# Patient Record
Sex: FEMALE | Race: BLACK OR AFRICAN AMERICAN | NOT HISPANIC OR LATINO | Employment: FULL TIME | URBAN - METROPOLITAN AREA
[De-identification: names, ages, dates, MRNs, and addresses within clinical notes are randomized per-mention and may not be internally consistent; named-entity substitution may affect disease eponyms.]

---

## 2020-01-30 ENCOUNTER — OFFICE VISIT (OUTPATIENT)
Dept: FAMILY MEDICINE | Facility: CLINIC | Age: 50
End: 2020-01-30
Payer: COMMERCIAL

## 2020-01-30 VITALS
OXYGEN SATURATION: 99 % | DIASTOLIC BLOOD PRESSURE: 87 MMHG | BODY MASS INDEX: 46.94 KG/M2 | HEART RATE: 72 BPM | WEIGHT: 274.94 LBS | SYSTOLIC BLOOD PRESSURE: 173 MMHG | HEIGHT: 64 IN

## 2020-01-30 DIAGNOSIS — R73.03 PREDIABETES: ICD-10-CM

## 2020-01-30 DIAGNOSIS — Z00.00 ANNUAL PHYSICAL EXAM: Primary | ICD-10-CM

## 2020-01-30 DIAGNOSIS — N89.8 VAGINAL DISCHARGE: ICD-10-CM

## 2020-01-30 DIAGNOSIS — E66.01 SEVERE OBESITY (BMI >= 40): ICD-10-CM

## 2020-01-30 DIAGNOSIS — Z12.39 SCREENING FOR BREAST CANCER: ICD-10-CM

## 2020-01-30 DIAGNOSIS — J30.2 SEASONAL ALLERGIES: ICD-10-CM

## 2020-01-30 DIAGNOSIS — I10 ESSENTIAL HYPERTENSION: ICD-10-CM

## 2020-01-30 DIAGNOSIS — Z11.4 SCREENING FOR HIV (HUMAN IMMUNODEFICIENCY VIRUS): ICD-10-CM

## 2020-01-30 DIAGNOSIS — Z11.59 ENCOUNTER FOR HEPATITIS C SCREENING TEST FOR LOW RISK PATIENT: ICD-10-CM

## 2020-01-30 DIAGNOSIS — Z12.4 SCREENING FOR MALIGNANT NEOPLASM OF CERVIX: ICD-10-CM

## 2020-01-30 PROCEDURE — 90471 IMMUNIZATION ADMIN: CPT | Mod: S$GLB,,, | Performed by: FAMILY MEDICINE

## 2020-01-30 PROCEDURE — 3079F DIAST BP 80-89 MM HG: CPT | Mod: CPTII,S$GLB,, | Performed by: FAMILY MEDICINE

## 2020-01-30 PROCEDURE — 87491 CHLMYD TRACH DNA AMP PROBE: CPT | Mod: 59

## 2020-01-30 PROCEDURE — 3077F PR MOST RECENT SYSTOLIC BLOOD PRESSURE >= 140 MM HG: ICD-10-PCS | Mod: CPTII,S$GLB,, | Performed by: FAMILY MEDICINE

## 2020-01-30 PROCEDURE — 3077F SYST BP >= 140 MM HG: CPT | Mod: CPTII,S$GLB,, | Performed by: FAMILY MEDICINE

## 2020-01-30 PROCEDURE — 90715 TDAP VACCINE 7 YRS/> IM: CPT | Mod: S$GLB,,, | Performed by: FAMILY MEDICINE

## 2020-01-30 PROCEDURE — 90715 TDAP VACCINE GREATER THAN OR EQUAL TO 7YO IM: ICD-10-PCS | Mod: S$GLB,,, | Performed by: FAMILY MEDICINE

## 2020-01-30 PROCEDURE — 87624 HPV HI-RISK TYP POOLED RSLT: CPT

## 2020-01-30 PROCEDURE — 99999 PR PBB SHADOW E&M-NEW PATIENT-LVL IV: CPT | Mod: PBBFAC,,, | Performed by: FAMILY MEDICINE

## 2020-01-30 PROCEDURE — 90471 TDAP VACCINE GREATER THAN OR EQUAL TO 7YO IM: ICD-10-PCS | Mod: S$GLB,,, | Performed by: FAMILY MEDICINE

## 2020-01-30 PROCEDURE — 99386 PREV VISIT NEW AGE 40-64: CPT | Mod: 25,S$GLB,, | Performed by: FAMILY MEDICINE

## 2020-01-30 PROCEDURE — 87481 CANDIDA DNA AMP PROBE: CPT | Mod: 59

## 2020-01-30 PROCEDURE — 99386 PR PREVENTIVE VISIT,NEW,40-64: ICD-10-PCS | Mod: 25,S$GLB,, | Performed by: FAMILY MEDICINE

## 2020-01-30 PROCEDURE — 3079F PR MOST RECENT DIASTOLIC BLOOD PRESSURE 80-89 MM HG: ICD-10-PCS | Mod: CPTII,S$GLB,, | Performed by: FAMILY MEDICINE

## 2020-01-30 PROCEDURE — 99999 PR PBB SHADOW E&M-NEW PATIENT-LVL IV: ICD-10-PCS | Mod: PBBFAC,,, | Performed by: FAMILY MEDICINE

## 2020-01-30 PROCEDURE — 88175 CYTOPATH C/V AUTO FLUID REDO: CPT

## 2020-01-30 RX ORDER — CHLORTHALIDONE 25 MG/1
25 TABLET ORAL DAILY
Qty: 90 TABLET | Refills: 3 | Status: SHIPPED | OUTPATIENT
Start: 2020-01-30 | End: 2021-01-04

## 2020-01-30 NOTE — PROGRESS NOTES
Subjective:       Patient ID: Criss Mccarthy is a 49 y.o. female.    Chief Complaint: Establish Care    48 yo F pt, previously followed by me at [facility], with PMH significant for HTN,  Prediabetes, and Seasonal Allergies. She presents to establish care with me at [facility].     She desires annual wellness visit and job physical form completed.    BP: 142/94 in the office today. + h/o HTN; previously rx'd Lisinopril 10 mg daily and HCTZ 25 mg daily. Out of medication x 6 weeks  BMI: 47.19  Diet/Lifestyle: Diet overall is poor; she is not exercising.   Last Eye Exam: 1 year ago; wears rx'd corrective lenses  Last Dental Exam: > 6 months   LMP:  1/14/2020; comes q28 days   Last Pap: DUE   Last Mammogram: 1 year ago   Last Flu Vaccine: Has not had; declines  Last Tdap Vaccine: > 10 years ago     Prediabetes: Had been adherent with metformin 500 mg daily until 6 weeks ago     Seasonal Allergies: Previously doing well with loratadine 10 mg daily      Review of Systems   Constitutional: Negative for activity change and unexpected weight change.   HENT: Negative for hearing loss and trouble swallowing.    Eyes: Negative for discharge and visual disturbance.   Respiratory: Negative for wheezing.    Cardiovascular: Negative for chest pain.   Gastrointestinal: Negative for blood in stool, constipation, diarrhea and vomiting.   Endocrine: Negative for polydipsia and polyuria.   Genitourinary: Positive for vaginal discharge (associated with foul odor). Negative for difficulty urinating, dysuria, hematuria and menstrual problem.   Musculoskeletal: Negative for joint swelling and neck pain.   Neurological: Negative for weakness and headaches.   Psychiatric/Behavioral: Negative for confusion and dysphoric mood.         Past Medical History:   Diagnosis Date    Allergies     Hypertension     Prediabetes        Patient Active Problem List   Diagnosis    Severe obesity (BMI >= 40)    Essential hypertension    Prediabetes  "   Vaginal discharge    Seasonal allergies       Past Surgical History:   Procedure Laterality Date    TUBAL LIGATION         Family History   Problem Relation Age of Onset    Heart disease Mother     Hypertension Father     Diabetes Father     Kidney cancer Father     Lymphoma Maternal Grandmother     Lymphoma Maternal Uncle        Social History     Tobacco Use   Smoking Status Never Smoker       Social History     Social History Narrative    Tobacco: None    EtOH: 2 mixed drinks 3 times per week     Drug: None    Employment: Works at  [employer] (Works alongside [employer])     Education: 11th grade      Lives with boyfriend     2 children (17yo, 14yo) that lives with parents       Objective:        Vitals:    01/30/20 1309 01/30/20 1331   BP: (!) 142/94 (!) 173/87   Pulse: 78 72   SpO2: 99%    Weight: 124.7 kg (274 lb 14.6 oz)    Height: 5' 4" (1.626 m)        Physical Exam   Constitutional: She is oriented to person, place, and time. She appears well-developed and well-nourished. No distress.   Morbidly Obese (BMI 47.19)   HENT:   Head: Normocephalic and atraumatic.   Right Ear: Tympanic membrane, external ear and ear canal normal.   Left Ear: Tympanic membrane, external ear and ear canal normal.   Nose: Nose normal.   Mouth/Throat: Oropharynx is clear and moist. No oropharyngeal exudate.   Eyes: Conjunctivae and EOM are normal. No scleral icterus.   Neck: Normal range of motion. Neck supple. No thyromegaly present.   Cardiovascular: Normal rate, regular rhythm and normal heart sounds. Exam reveals no gallop and no friction rub.   No murmur heard.  Pulmonary/Chest: Effort normal and breath sounds normal. She has no wheezes. She has no rales.   Abdominal: Soft. She exhibits no distension and no mass. There is no tenderness.   Genitourinary: Vagina normal and uterus normal. Pelvic exam was performed with patient supine. There is no rash or tenderness on the right labia. There is no rash or " tenderness on the left labia. Uterus is not tender. Cervix exhibits friability. Cervix exhibits no motion tenderness and no discharge. Right adnexum displays no mass, no tenderness and no fullness. Left adnexum displays no mass, no tenderness and no fullness. No erythema in the vagina. No signs of injury around the vagina. No vaginal discharge found.   Musculoskeletal: Normal range of motion. She exhibits no edema.   Lymphadenopathy:     She has no cervical adenopathy.   Neurological: She is alert and oriented to person, place, and time. She displays normal reflexes. No cranial nerve deficit or sensory deficit.   Skin: Skin is warm and dry. No erythema.   + hair and acne noted to chin   Psychiatric: She has a normal mood and affect. Her behavior is normal.       Assessment:       1. Annual physical exam    2. Severe obesity (BMI >= 40)    3. Screening for malignant neoplasm of cervix    4. Screening for breast cancer    5. Encounter for hepatitis C screening test for low risk patient    6. Screening for HIV (human immunodeficiency virus)    7. Essential hypertension    8. Prediabetes    9. Vaginal discharge    10. Seasonal allergies        Plan:       Criss was seen today for establish care.    Diagnoses and all orders for this visit:    Annual physical exam  Comments:  --see below  Orders:  -     (In Office Administered) Tdap Vaccine  -     RPR; Future    Severe obesity (BMI >= 40)  Comments:  --see below  Orders:  -     Vitamin D; Future    Screening for malignant neoplasm of cervix  -     Liquid-Based Pap Smear, Screening  -     HPV High Risk Genotypes, PCR    Screening for breast cancer  -     Mammo Digital Screening Bilat; Future    Encounter for hepatitis C screening test for low risk patient  -     Hepatitis C antibody; Future    Screening for HIV (human immunodeficiency virus)  -     HIV 1/2 Ag/Ab (4th Gen); Future    Essential hypertension  Comments:  Uncontrolled; Asymptomatic Has not taken previously  rx'd Lisinopril 10 mg daily + HCTZ 25 mg daily in 6 weeks. Start chlorthalidone 25 mg daily. BP f/u 2 weeks.  Orders:  -     chlorthalidone (HYGROTEN) 25 MG Tab; Take 1 tablet (25 mg total) by mouth once daily.  -     CBC auto differential; Future  -     Comprehensive metabolic panel; Future  -     Hemoglobin A1c; Future  -     Lipid panel; Future  -     TSH; Future    Prediabetes  Comments:  Previously rx'd metformin 500 mg daily. Out of med x 6 weeks. Plan for A1c with fasting labs.     Vaginal discharge  Comments:  None noted on exam. Vaginosis and GC/CT screen today  Orders:  -     Vaginosis Screen by DNA Probe  -     C. trachomatis/N. gonorrhoeae by AMP DNA    Seasonal allergies  Comments:  Continue Claritin 10 mg daily prn.     Annual Exam  Advised annual eye exams and adherence with rx'd corrective lenses  Advised dental exams q.6 months  Pap today 1/30/2020  Refer for Mammogram today 1/30/2020  Flu Vaccine declined today 1/30/2020  Tdap Vaccine today 1/30/2020    Morbidly Obese  BMI 47.19  Advised at least 30 min of CV exercise 5 days a week. Encouraged plant-based diet. Advised small/frequent meals.  Also advised avoidance of sweetened beverages, limit portion sizes, and discussed healthy carbohydrate options (brown rice, 100% whole wheat bread, wheat pasta)        Follow up in about 2 weeks (around 2/13/2020) for HTN and lab review.

## 2020-01-31 LAB
BACTERIAL VAGINOSIS DNA: POSITIVE
C TRACH DNA SPEC QL NAA+PROBE: NOT DETECTED
CANDIDA GLABRATA DNA: NEGATIVE
CANDIDA KRUSEI DNA: NEGATIVE
CANDIDA RRNA VAG QL PROBE: NEGATIVE
N GONORRHOEA DNA SPEC QL NAA+PROBE: NOT DETECTED
T VAGINALIS RRNA GENITAL QL PROBE: NEGATIVE

## 2020-02-01 ENCOUNTER — PATIENT MESSAGE (OUTPATIENT)
Dept: FAMILY MEDICINE | Facility: CLINIC | Age: 50
End: 2020-02-01

## 2020-02-01 DIAGNOSIS — N76.0 BACTERIAL VAGINOSIS: Primary | ICD-10-CM

## 2020-02-01 DIAGNOSIS — B96.89 BACTERIAL VAGINOSIS: Primary | ICD-10-CM

## 2020-02-01 RX ORDER — METRONIDAZOLE 500 MG/1
500 TABLET ORAL EVERY 12 HOURS
Qty: 14 TABLET | Refills: 0 | Status: SHIPPED | OUTPATIENT
Start: 2020-02-01 | End: 2020-02-08

## 2020-02-04 ENCOUNTER — PATIENT MESSAGE (OUTPATIENT)
Dept: FAMILY MEDICINE | Facility: CLINIC | Age: 50
End: 2020-02-04

## 2020-02-06 LAB
HPV HR 12 DNA SPEC QL NAA+PROBE: NEGATIVE
HPV16 AG SPEC QL: NEGATIVE
HPV18 DNA SPEC QL NAA+PROBE: NEGATIVE

## 2020-02-12 ENCOUNTER — HOSPITAL ENCOUNTER (OUTPATIENT)
Dept: RADIOLOGY | Facility: HOSPITAL | Age: 50
Discharge: HOME | End: 2020-02-12
Attending: FAMILY MEDICINE
Payer: COMMERCIAL

## 2020-02-12 DIAGNOSIS — Z12.39 SCREENING FOR BREAST CANCER: ICD-10-CM

## 2020-02-12 PROCEDURE — 77067 MAMMO DIGITAL SCREENING BILAT WITH TOMOSYNTHESIS_CAD: ICD-10-PCS | Mod: 26,,, | Performed by: RADIOLOGY

## 2020-02-12 PROCEDURE — 77067 SCR MAMMO BI INCL CAD: CPT | Mod: 26,,, | Performed by: RADIOLOGY

## 2020-02-12 PROCEDURE — 77067 SCR MAMMO BI INCL CAD: CPT | Mod: TC

## 2020-02-12 PROCEDURE — 77063 BREAST TOMOSYNTHESIS BI: CPT | Mod: 26,,, | Performed by: RADIOLOGY

## 2020-02-12 PROCEDURE — 77063 MAMMO DIGITAL SCREENING BILAT WITH TOMOSYNTHESIS_CAD: ICD-10-PCS | Mod: 26,,, | Performed by: RADIOLOGY

## 2020-02-23 LAB
FINAL PATHOLOGIC DIAGNOSIS: NORMAL
Lab: NORMAL

## 2020-02-24 ENCOUNTER — PATIENT MESSAGE (OUTPATIENT)
Dept: FAMILY MEDICINE | Facility: CLINIC | Age: 50
End: 2020-02-24

## 2020-02-28 ENCOUNTER — LAB VISIT (OUTPATIENT)
Dept: LAB | Facility: HOSPITAL | Age: 50
End: 2020-02-28
Attending: FAMILY MEDICINE
Payer: COMMERCIAL

## 2020-02-28 DIAGNOSIS — I10 ESSENTIAL HYPERTENSION: ICD-10-CM

## 2020-02-28 DIAGNOSIS — Z11.4 SCREENING FOR HIV (HUMAN IMMUNODEFICIENCY VIRUS): ICD-10-CM

## 2020-02-28 DIAGNOSIS — E66.01 SEVERE OBESITY (BMI >= 40): ICD-10-CM

## 2020-02-28 DIAGNOSIS — Z00.00 ANNUAL PHYSICAL EXAM: ICD-10-CM

## 2020-02-28 DIAGNOSIS — Z11.59 ENCOUNTER FOR HEPATITIS C SCREENING TEST FOR LOW RISK PATIENT: ICD-10-CM

## 2020-02-28 LAB
25(OH)D3+25(OH)D2 SERPL-MCNC: 11 NG/ML (ref 30–96)
ALBUMIN SERPL BCP-MCNC: 3.5 G/DL (ref 3.5–5.2)
ALP SERPL-CCNC: 89 U/L (ref 55–135)
ALT SERPL W/O P-5'-P-CCNC: 19 U/L (ref 10–44)
ANION GAP SERPL CALC-SCNC: 10 MMOL/L (ref 8–16)
AST SERPL-CCNC: 19 U/L (ref 10–40)
BASOPHILS # BLD AUTO: 0.04 K/UL (ref 0–0.2)
BASOPHILS NFR BLD: 0.5 % (ref 0–1.9)
BILIRUB SERPL-MCNC: 0.6 MG/DL (ref 0.1–1)
BUN SERPL-MCNC: 9 MG/DL (ref 6–20)
CALCIUM SERPL-MCNC: 9 MG/DL (ref 8.7–10.5)
CHLORIDE SERPL-SCNC: 101 MMOL/L (ref 95–110)
CHOLEST SERPL-MCNC: 219 MG/DL (ref 120–199)
CHOLEST/HDLC SERPL: 2.3 {RATIO} (ref 2–5)
CO2 SERPL-SCNC: 29 MMOL/L (ref 23–29)
CREAT SERPL-MCNC: 0.8 MG/DL (ref 0.5–1.4)
DIFFERENTIAL METHOD: ABNORMAL
EOSINOPHIL # BLD AUTO: 0.1 K/UL (ref 0–0.5)
EOSINOPHIL NFR BLD: 0.8 % (ref 0–8)
ERYTHROCYTE [DISTWIDTH] IN BLOOD BY AUTOMATED COUNT: 15.8 % (ref 11.5–14.5)
EST. GFR  (AFRICAN AMERICAN): >60 ML/MIN/1.73 M^2
EST. GFR  (NON AFRICAN AMERICAN): >60 ML/MIN/1.73 M^2
ESTIMATED AVG GLUCOSE: 131 MG/DL (ref 68–131)
GLUCOSE SERPL-MCNC: 98 MG/DL (ref 70–110)
HBA1C MFR BLD HPLC: 6.2 % (ref 4–5.6)
HCT VFR BLD AUTO: 34.8 % (ref 37–48.5)
HCV AB SERPL QL IA: NEGATIVE
HDLC SERPL-MCNC: 95 MG/DL (ref 40–75)
HDLC SERPL: 43.4 % (ref 20–50)
HGB BLD-MCNC: 10.1 G/DL (ref 12–16)
HIV 1+2 AB+HIV1 P24 AG SERPL QL IA: NEGATIVE
IMM GRANULOCYTES # BLD AUTO: 0.03 K/UL (ref 0–0.04)
IMM GRANULOCYTES NFR BLD AUTO: 0.3 % (ref 0–0.5)
LDLC SERPL CALC-MCNC: 112.4 MG/DL (ref 63–159)
LYMPHOCYTES # BLD AUTO: 2.1 K/UL (ref 1–4.8)
LYMPHOCYTES NFR BLD: 24.2 % (ref 18–48)
MCH RBC QN AUTO: 24.2 PG (ref 27–31)
MCHC RBC AUTO-ENTMCNC: 29 G/DL (ref 32–36)
MCV RBC AUTO: 83 FL (ref 82–98)
MONOCYTES # BLD AUTO: 0.6 K/UL (ref 0.3–1)
MONOCYTES NFR BLD: 7 % (ref 4–15)
NEUTROPHILS # BLD AUTO: 5.9 K/UL (ref 1.8–7.7)
NEUTROPHILS NFR BLD: 67.2 % (ref 38–73)
NONHDLC SERPL-MCNC: 124 MG/DL
NRBC BLD-RTO: 0 /100 WBC
PLATELET # BLD AUTO: 504 K/UL (ref 150–350)
PMV BLD AUTO: 9.5 FL (ref 9.2–12.9)
POTASSIUM SERPL-SCNC: 3.2 MMOL/L (ref 3.5–5.1)
PROT SERPL-MCNC: 7.5 G/DL (ref 6–8.4)
RBC # BLD AUTO: 4.18 M/UL (ref 4–5.4)
SODIUM SERPL-SCNC: 140 MMOL/L (ref 136–145)
TRIGL SERPL-MCNC: 58 MG/DL (ref 30–150)
TSH SERPL DL<=0.005 MIU/L-ACNC: 1.95 UIU/ML (ref 0.4–4)
WBC # BLD AUTO: 8.81 K/UL (ref 3.9–12.7)

## 2020-02-28 PROCEDURE — 82306 VITAMIN D 25 HYDROXY: CPT

## 2020-02-28 PROCEDURE — 80061 LIPID PANEL: CPT

## 2020-02-28 PROCEDURE — 86592 SYPHILIS TEST NON-TREP QUAL: CPT

## 2020-02-28 PROCEDURE — 84443 ASSAY THYROID STIM HORMONE: CPT

## 2020-02-28 PROCEDURE — 83036 HEMOGLOBIN GLYCOSYLATED A1C: CPT

## 2020-02-28 PROCEDURE — 85025 COMPLETE CBC W/AUTO DIFF WBC: CPT

## 2020-02-28 PROCEDURE — 86803 HEPATITIS C AB TEST: CPT

## 2020-02-28 PROCEDURE — 80053 COMPREHEN METABOLIC PANEL: CPT

## 2020-02-28 PROCEDURE — 86703 HIV-1/HIV-2 1 RESULT ANTBDY: CPT

## 2020-02-28 PROCEDURE — 36415 COLL VENOUS BLD VENIPUNCTURE: CPT

## 2020-02-29 ENCOUNTER — PATIENT MESSAGE (OUTPATIENT)
Dept: FAMILY MEDICINE | Facility: CLINIC | Age: 50
End: 2020-02-29

## 2020-02-29 LAB — RPR SER QL: NORMAL

## 2020-03-08 ENCOUNTER — PATIENT MESSAGE (OUTPATIENT)
Dept: FAMILY MEDICINE | Facility: CLINIC | Age: 50
End: 2020-03-08

## 2020-03-08 DIAGNOSIS — E87.6 HYPOKALEMIA: ICD-10-CM

## 2020-03-08 DIAGNOSIS — E55.9 VITAMIN D DEFICIENCY: Primary | ICD-10-CM

## 2020-03-08 RX ORDER — POTASSIUM CHLORIDE 750 MG/1
20 TABLET, EXTENDED RELEASE ORAL DAILY
Qty: 90 TABLET | Refills: 3 | Status: SHIPPED | OUTPATIENT
Start: 2020-03-08 | End: 2020-10-07

## 2020-03-08 RX ORDER — ERGOCALCIFEROL 1.25 MG/1
50000 CAPSULE ORAL
Qty: 4 CAPSULE | Refills: 2 | Status: SHIPPED | OUTPATIENT
Start: 2020-03-08 | End: 2020-06-17

## 2020-03-08 NOTE — TELEPHONE ENCOUNTER
The 10-year ASCVD risk score (Remberto STAFFORD Jr., et al., 2013) is: 4.6%    Values used to calculate the score:      Age: 49 years      Sex: Female      Is Non- : Yes      Diabetic: No      Tobacco smoker: No      Systolic Blood Pressure: 173 mmHg      Is BP treated: Yes      HDL Cholesterol: 95 mg/dL      Total Cholesterol: 219 mg/dL

## 2020-03-25 ENCOUNTER — PATIENT MESSAGE (OUTPATIENT)
Dept: FAMILY MEDICINE | Facility: CLINIC | Age: 50
End: 2020-03-25

## 2020-04-23 ENCOUNTER — PATIENT MESSAGE (OUTPATIENT)
Dept: ADMINISTRATIVE | Facility: HOSPITAL | Age: 50
End: 2020-04-23

## 2020-04-28 ENCOUNTER — OFFICE VISIT (OUTPATIENT)
Dept: FAMILY MEDICINE | Facility: CLINIC | Age: 50
End: 2020-04-28
Payer: COMMERCIAL

## 2020-04-28 DIAGNOSIS — J30.2 SEASONAL ALLERGIES: ICD-10-CM

## 2020-04-28 DIAGNOSIS — N76.0 BACTERIAL VAGINOSIS: Primary | ICD-10-CM

## 2020-04-28 DIAGNOSIS — L30.9 DERMATITIS: ICD-10-CM

## 2020-04-28 DIAGNOSIS — E66.01 SEVERE OBESITY (BMI >= 40): ICD-10-CM

## 2020-04-28 DIAGNOSIS — B96.89 BACTERIAL VAGINOSIS: Primary | ICD-10-CM

## 2020-04-28 PROCEDURE — 99214 PR OFFICE/OUTPT VISIT, EST, LEVL IV, 30-39 MIN: ICD-10-PCS | Mod: 95,,, | Performed by: FAMILY MEDICINE

## 2020-04-28 PROCEDURE — 99214 OFFICE O/P EST MOD 30 MIN: CPT | Mod: 95,,, | Performed by: FAMILY MEDICINE

## 2020-04-28 RX ORDER — METRONIDAZOLE 500 MG/1
500 TABLET ORAL 2 TIMES DAILY
Qty: 14 TABLET | Refills: 0 | Status: SHIPPED | OUTPATIENT
Start: 2020-04-28 | End: 2020-05-05

## 2020-04-28 RX ORDER — FLUTICASONE PROPIONATE 50 MCG
2 SPRAY, SUSPENSION (ML) NASAL DAILY
Qty: 16 G | Refills: 0 | Status: SHIPPED | OUTPATIENT
Start: 2020-04-28 | End: 2020-05-20

## 2020-04-28 RX ORDER — TRIAMCINOLONE ACETONIDE 1 MG/G
CREAM TOPICAL 2 TIMES DAILY
Qty: 30 G | Refills: 1 | Status: SHIPPED | OUTPATIENT
Start: 2020-04-28 | End: 2021-03-22

## 2020-04-28 NOTE — PROGRESS NOTES
The patient location is: home  The chief complaint leading to consultation is:  Vaginal odor  Visit type: Virtual visit with synchronous audio and video  Total time spent with patient:  15 min  Each patient to whom he or she provides medical services by telemedicine is:  (1) informed of the relationship between the physician and patient and the respective role of any other health care provider with respect to management of the patient; and (2) notified that he or she may decline to receive medical services by telemedicine and may withdraw from such care at any time.    (Portions of this note were dictated using voice recognition software and may contain dictation related errors in spelling/grammar/syntax not found on text review)    CC:   Chief Complaint   Patient presents with    Vaginal odor    Sinus Problem    Rash       HPI: 49 y.o. female patient of Dr. Sanon, new to me, on virtual visit for urgent care concern about vaginal odor.  Saw Dr. Sanon for annual physical exam in January 2020, also complained of vaginal discharge associated with foul odor at that time.  Testing demonstrated negative GC and chlamydia.  Positive affirm test for bacterial vaginosis.  Pap normal.  Was prescribed metronidazole, advised on proper vaginal hygiene and avoiding douching.  Sent message on 04/23 concerned about return of vaginal odor similar to prior.  States that both episodes happened after intercourse with her male partner with whom she is no longer with now.  In between episodes she had no significant issues.  Denies any vaginal bleeding.  Denies any pelvic pain.  Denies any urinary symptoms.  No fevers or chills    Rash on neck, feels like it to heat rash, worsened by sweats.  Usually wears a necklace but has not been wearing this recently with onset of rash.  Feels itchy.  No other areas affected by rash.  Skin is very dry    Sinus problem:  Had seen Dr. Sanon in the past and was on Claritin also Flonase.   Has not been on anything recently but does get some nasal congestion.  No other acute bacterial sinusitis concerns.  Did feel somewhat out of it and malaise this morning from the symptoms so she took off work      Past Medical History:   Diagnosis Date    Allergies     Hypertension     Prediabetes        Past Surgical History:   Procedure Laterality Date    TUBAL LIGATION         Family History   Problem Relation Age of Onset    Heart disease Mother     Hypertension Father     Diabetes Father     Kidney cancer Father     Lymphoma Maternal Grandmother     Lymphoma Maternal Uncle        Social History     Tobacco Use    Smoking status: Never Smoker   Substance Use Topics    Alcohol use: Yes     Frequency: 2-3 times a week     Drinks per session: 1 or 2     Binge frequency: Monthly    Drug use: Not on file       Lab Results   Component Value Date    WBC 8.81 02/28/2020    HGB 10.1 (L) 02/28/2020    HCT 34.8 (L) 02/28/2020    MCV 83 02/28/2020     (H) 02/28/2020    CHOL 219 (H) 02/28/2020    TRIG 58 02/28/2020    HDL 95 (H) 02/28/2020    ALT 19 02/28/2020    AST 19 02/28/2020    BILITOT 0.6 02/28/2020    ALKPHOS 89 02/28/2020     02/28/2020    K 3.2 (L) 02/28/2020     02/28/2020    CREATININE 0.8 02/28/2020    ESTGFRAFRICA >60.0 02/28/2020    EGFRNONAA >60.0 02/28/2020    CALCIUM 9.0 02/28/2020    ALBUMIN 3.5 02/28/2020    BUN 9 02/28/2020    CO2 29 02/28/2020    TSH 1.949 02/28/2020    HGBA1C 6.2 (H) 02/28/2020    LDLCALC 112.4 02/28/2020    GLU 98 02/28/2020                 ROS:  GENERAL:  Above  SKIN:  Above.  HEAD:  Above  EYES: No visual changes  EARS: No ear pain or changes in hearing.  NOSE:  Above  MOUTH & THROAT: No hoarseness, change in voice, or sore throat.  NODES: Denies swollen glands.  CHEST: Denies WAGNER, cyanosis, wheezing, cough and sputum production.  CARDIOVASCULAR: Denies chest pain, PND, orthopnea.  ABDOMEN: No nausea, vomiting, or changes in bowel  function.  URINARY: No flank pain, dysuria or hematuria.  PERIPHERAL VASCULAR: No claudication or cyanosis.  MUSCULOSKELETAL: No joint stiffness or swelling. Denies back pain.  NEUROLOGIC: No weakness or numbness.       PE (elements able to be captured on virtual encounter)  APPEARANCE: Well nourished, well developed, in no acute distress.    HEAD: Normocephalic, atraumatic.  EYES:  .   Conjunctivae noninjected.  RESPIRATORY:  No increased work of breathing or objective visual signs of dyspnea  PSYCHIATRIC:  Normal mood and affect, alert and oriented, appropriate answers to questions  SKIN :  Noted hyperpigmented scaly rash noted along neck line bilaterally.  No erythema noted.    IMPRESSION  1. Bacterial vaginosis    2. Dermatitis    3. Seasonal allergies    4. Severe obesity (BMI >= 40)            PLAN  History points to likely bacterial vaginosis.  Advised prevention techniques.  Retry  Flagyl 500 mg b.i.d. for 7 days.  If persistent symptoms may need to consider a more prolonged protocol with intravaginal metronidazole as well    Dermatitis around neck, possibly atopic versus contact related.  Trial of triamcinolone cream b.i.d. for 1-2 weeks.  Advised daily moisturization with Cetaphil.  Reviewed labs, no   diabetes, prediabetes noted on most recent lab work    Nasal congestion likely allergic rhinitis related.  Advise Claritin is okay.  Will send over an other course of Flonase 2 sprays each nostril once daily for the next 2-3 weeks.

## 2020-05-20 RX ORDER — FLUTICASONE PROPIONATE 50 MCG
2 SPRAY, SUSPENSION (ML) NASAL DAILY
Qty: 16 ML | Refills: 0 | Status: SHIPPED | OUTPATIENT
Start: 2020-05-20 | End: 2020-06-18 | Stop reason: SDUPTHER

## 2020-09-02 ENCOUNTER — PATIENT OUTREACH (OUTPATIENT)
Dept: ADMINISTRATIVE | Facility: HOSPITAL | Age: 50
End: 2020-09-02

## 2020-09-16 ENCOUNTER — OFFICE VISIT (OUTPATIENT)
Dept: FAMILY MEDICINE | Facility: CLINIC | Age: 50
End: 2020-09-16
Payer: COMMERCIAL

## 2020-09-16 VITALS
DIASTOLIC BLOOD PRESSURE: 71 MMHG | WEIGHT: 267 LBS | SYSTOLIC BLOOD PRESSURE: 132 MMHG | HEART RATE: 88 BPM | HEIGHT: 64 IN | OXYGEN SATURATION: 100 % | BODY MASS INDEX: 45.58 KG/M2 | TEMPERATURE: 99 F

## 2020-09-16 DIAGNOSIS — H31.8 IDIOPATHIC POLYPOIDAL CHOROIDAL VASCULOPATHY: ICD-10-CM

## 2020-09-16 DIAGNOSIS — R73.03 PREDIABETES: ICD-10-CM

## 2020-09-16 DIAGNOSIS — R06.83 SNORING: Primary | ICD-10-CM

## 2020-09-16 DIAGNOSIS — I10 ESSENTIAL HYPERTENSION: ICD-10-CM

## 2020-09-16 DIAGNOSIS — H35.3290 NEOVASCULAR AGE-RELATED MACULAR DEGENERATION: ICD-10-CM

## 2020-09-16 DIAGNOSIS — R06.09 DYSPNEA ON EXERTION: ICD-10-CM

## 2020-09-16 DIAGNOSIS — D64.9 NORMOCYTIC ANEMIA: ICD-10-CM

## 2020-09-16 DIAGNOSIS — E66.01 SEVERE OBESITY (BMI >= 40): ICD-10-CM

## 2020-09-16 PROBLEM — N89.8 VAGINAL DISCHARGE: Status: RESOLVED | Noted: 2020-01-30 | Resolved: 2020-09-16

## 2020-09-16 PROCEDURE — 3075F SYST BP GE 130 - 139MM HG: CPT | Mod: CPTII,S$GLB,, | Performed by: FAMILY MEDICINE

## 2020-09-16 PROCEDURE — 99999 PR PBB SHADOW E&M-EST. PATIENT-LVL IV: ICD-10-PCS | Mod: PBBFAC,,, | Performed by: FAMILY MEDICINE

## 2020-09-16 PROCEDURE — 99999 PR PBB SHADOW E&M-EST. PATIENT-LVL IV: CPT | Mod: PBBFAC,,, | Performed by: FAMILY MEDICINE

## 2020-09-16 PROCEDURE — 3078F PR MOST RECENT DIASTOLIC BLOOD PRESSURE < 80 MM HG: ICD-10-PCS | Mod: CPTII,S$GLB,, | Performed by: FAMILY MEDICINE

## 2020-09-16 PROCEDURE — 3075F PR MOST RECENT SYSTOLIC BLOOD PRESS GE 130-139MM HG: ICD-10-PCS | Mod: CPTII,S$GLB,, | Performed by: FAMILY MEDICINE

## 2020-09-16 PROCEDURE — 1126F AMNT PAIN NOTED NONE PRSNT: CPT | Mod: S$GLB,,, | Performed by: FAMILY MEDICINE

## 2020-09-16 PROCEDURE — 1126F PR PAIN SEVERITY QUANTIFIED, NO PAIN PRESENT: ICD-10-PCS | Mod: S$GLB,,, | Performed by: FAMILY MEDICINE

## 2020-09-16 PROCEDURE — 99214 OFFICE O/P EST MOD 30 MIN: CPT | Mod: S$GLB,,, | Performed by: FAMILY MEDICINE

## 2020-09-16 PROCEDURE — 99214 PR OFFICE/OUTPT VISIT, EST, LEVL IV, 30-39 MIN: ICD-10-PCS | Mod: S$GLB,,, | Performed by: FAMILY MEDICINE

## 2020-09-16 PROCEDURE — 3078F DIAST BP <80 MM HG: CPT | Mod: CPTII,S$GLB,, | Performed by: FAMILY MEDICINE

## 2020-09-16 PROCEDURE — 3008F BODY MASS INDEX DOCD: CPT | Mod: CPTII,S$GLB,, | Performed by: FAMILY MEDICINE

## 2020-09-16 PROCEDURE — 3008F PR BODY MASS INDEX (BMI) DOCUMENTED: ICD-10-PCS | Mod: CPTII,S$GLB,, | Performed by: FAMILY MEDICINE

## 2020-09-16 NOTE — PROGRESS NOTES
Subjective:       Patient ID: Criss Mccarthy is a 50 y.o. female.    Chief Complaint: Eye Problem and Shortness of Breath    51 yo F, established pt of mine, with PMH significant for HTN,  Prediabetes, and Seasonal Allergies. She presents today to discuss abnormal eye findings.  Since for a follow-up office visit; last evaluated 1/2020; accompanied by her 15-year-old daughter in the office today.  Patient had been having difficulty seeing from the right eye and was evaluated at [facility] earlier today.  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  Recommended to start DHEA daily; magnesium threonate 144 mg 2 tabs BID; Coenzyme Q10; and Carnitine once daily.  Advised to avoid steroids (including intranasal); Sudafed; and Afrin.  She is okay to take Astelin nasal spray as needed for allergies. Ophthalmology did recommend patient be evaluated for sleep apnea as this can be a cause of her abnormal eye findings.  Patient does endorse snoring; denies daytime fatigue/tiredness; daughter reports observed apneic episodes; pt with h/o obesity and well-controlled hypertension.     Pt does c/o dyspnea on exertion.  States that she is completely out of breath after ascending a flight of steps in her home.  Denies associated chest pain, dizziness, and syncope.  No lower extremity swelling.  Symptoms resolve at rest.  She has begun to exercise at the gym in hopes to improve exercise tolerance.  She began walking on the treadmill for 7 min and has increased tolerance to 15 min thus far. She has lost about 8 lb over the past 8 months.  Noted that her last A1c was 6.2 February 2020; TC to 19, TG 58, HDL 95, .4 February 2020; The 10-year ASCVD risk score ([place] DC Jr., et al., 2013) is: 1.5% blood count from February 2020 showed H/H 10.1/34.8; MCV 83.  Patient is concerned she has a family history of heart disease/heart  failure        Review of Systems   Constitutional: Negative for activity change and unexpected weight change.   HENT: Negative for hearing loss, rhinorrhea and trouble swallowing.    Eyes: Positive for visual disturbance. Negative for discharge.   Respiratory: Positive for shortness of breath. Negative for chest tightness and wheezing.    Cardiovascular: Positive for palpitations. Negative for chest pain.   Gastrointestinal: Negative for blood in stool, constipation, diarrhea and vomiting.   Endocrine: Negative for polydipsia and polyuria.   Genitourinary: Negative for difficulty urinating, dysuria, hematuria and menstrual problem.   Musculoskeletal: Positive for arthralgias. Negative for joint swelling and neck pain.   Neurological: Negative for weakness and headaches.   Psychiatric/Behavioral: Negative for confusion and dysphoric mood.         Past Medical History:   Diagnosis Date    Allergies     Hypertension     Idiopathic polypoidal choroidal vasculopathy     Prediabetes        Patient Active Problem List   Diagnosis    Severe obesity (BMI >= 40)    Essential hypertension    Prediabetes    Seasonal allergies    Neovascular age-related macular degeneration    Idiopathic polypoidal choroidal vasculopathy    Normocytic anemia       Past Surgical History:   Procedure Laterality Date    TUBAL LIGATION         Family History   Problem Relation Age of Onset    Heart disease Mother     Hypertension Father     Diabetes Father     Kidney cancer Father     Lymphoma Maternal Grandmother     Lymphoma Maternal Uncle        Social History     Tobacco Use   Smoking Status Never Smoker       Social History     Social History Narrative    Tobacco: None    EtOH: 2 mixed drinks 3 times per week     Drug: None    Employment: Works at  [employer] (Works alongside [employer])     Education: 11th grade      Lives with boyfriend     2 children (15yo, 14yo) that lives with parents       Objective:     "    Vitals:    09/16/20 1314   BP: 132/71   Pulse: 88   Temp: 98.8 °F (37.1 °C)   TempSrc: Oral   SpO2: 100%   Weight: 121.1 kg (266 lb 15.6 oz)   Height: 5' 4" (1.626 m)         Physical Exam   Constitutional: She is oriented to person, place, and time. She appears obese.  No distress.   Morbidly Obese (BMI 45.83)   HENT:   Head: Normocephalic and atraumatic.   Right Ear:  External ear normal  Left Ear:  External ear normal  Nose: Not examined  Mouth/Throat: Not examined  Eyes: Conjunctivae and EOM are normal. No scleral icterus.   Neck: Normal range of motion. Neck supple. No thyromegaly present.   Cardiovascular: Normal rate, regular rhythm and normal heart sounds. Exam reveals no gallop and no friction rub.   No murmur heard.  Pulmonary/Chest: Effort normal and breath sounds normal. She has no wheezes. She has no rales.   Musculoskeletal: Normal range of motion. She exhibits no lower extremity edema.   Neurological: She is alert and oriented to person, place, and time. She displays normal reflexes. No cranial nerve deficit or sensory deficit.   Skin: Skin is warm and dry. No erythema.   + hair and acne noted to chin   Psychiatric: She has a normal mood and affect. Her behavior is normal.     Assessment:       1. Snoring    2. Dyspnea on exertion    3. Essential hypertension    4. Prediabetes    5. Severe obesity (BMI >= 40)    6. 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 Idiopathic polypoidal choroidal vasculopathy    8. Normocytic anemia        Plan:       Criss was seen today for eye problem and shortness of breath.    Diagnoses and all orders for this visit:    Snoring  -     Ambulatory referral/consult to Sleep Disorders; Future    Dyspnea on exertion  -     IN OFFICE EKG 12-LEAD (to Muse)  -     Echo Color Flow Doppler? Yes; Future    Essential hypertension    Prediabetes    Severe obesity (BMI >= 40)    Neovascular age-related macular degeneration    Idiopathic polypoidal choroidal " vasculopathy    Normocytic anemia      Snoring  Stop screen 3/4  Refer to sleep medicine for home sleep study    Dyspnea on exertion  Likely due to obesity  Will rule out cardiovascular etiology with EKG and TTE  Cardiovascular risks include hypertension which is well controlled; and prediabetes with A1c of 6.2 February 2020  Metabolic risk:  Mild normocytic anemia  Encouraged continued diet/lifestyle modifications and weight loss    Hypertension  Well controlled  Continue chlorthalidone 25 mg daily    Pre diabetes  A1c 6.2 02/2020  Diet/lifestyle modifications as below    Normocytic anemia   H/H 10.1/34.8; MCV 83 2/2020  Mild; asymptomatic  Consider iron studies with next lab draw    Morbidly Obese  BMI 45.83  Advised at least 30 min of CV exercise 5 days a week. Encouraged plant-based diet. Advised small/frequent meals.  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  Will avoid steroids; Sudafed; and Afrin.  Can give Astelin p.r.n. allergy symptoms    Health maintenance  Advised annual eye exams and adherence with rx'd corrective lenses  Advised dental exams q.6 months  Pap/HPV negative 1/30/2020  Mammogram BI-RADS category 1 1/30/2020  Flu Vaccine declined today 09/16/2020  Tdap Vaccine administered 1/30/2020    Follow-up plan pending sleep Medicine recommendations; EKG results; and echo results

## 2020-09-22 ENCOUNTER — HOSPITAL ENCOUNTER (OUTPATIENT)
Dept: CARDIOLOGY | Facility: HOSPITAL | Age: 50
Discharge: HOME | End: 2020-09-22
Attending: FAMILY MEDICINE
Payer: COMMERCIAL

## 2020-09-22 VITALS — HEIGHT: 64 IN | BODY MASS INDEX: 45.41 KG/M2 | WEIGHT: 266 LBS

## 2020-09-22 DIAGNOSIS — R06.09 DYSPNEA ON EXERTION: ICD-10-CM

## 2020-09-22 LAB
AORTIC ROOT ANNULUS: 2.78 CM
ASCENDING AORTA: 2.7 CM
AV INDEX (PROSTH): 0.64
AV MEAN GRADIENT: 9 MMHG
AV PEAK GRADIENT: 16 MMHG
AV VALVE AREA: 1.63 CM2
AV VELOCITY RATIO: 0.67
BSA FOR ECHO PROCEDURE: 2.33 M2
CV ECHO LV RWT: 0.44 CM
DOP CALC AO PEAK VEL: 2.02 M/S
DOP CALC AO VTI: 44.23 CM
DOP CALC LVOT AREA: 2.5 CM2
DOP CALC LVOT DIAMETER: 1.8 CM
DOP CALC LVOT PEAK VEL: 1.36 M/S
DOP CALC LVOT STROKE VOLUME: 71.88 CM3
DOP CALCLVOT PEAK VEL VTI: 28.26 CM
E WAVE DECELERATION TIME: 227.57 MSEC
E/A RATIO: 1.61
E/E' RATIO: 9.42 M/S
ECHO LV POSTERIOR WALL: 0.99 CM (ref 0.6–1.1)
FRACTIONAL SHORTENING: 45 % (ref 28–44)
INTERVENTRICULAR SEPTUM: 1.12 CM (ref 0.6–1.1)
LA MAJOR: 5.94 CM
LA MINOR: 5.08 CM
LA WIDTH: 4.13 CM
LEFT ATRIUM SIZE: 3.44 CM
LEFT ATRIUM VOLUME INDEX: 30 ML/M2
LEFT ATRIUM VOLUME: 66.13 CM3
LEFT INTERNAL DIMENSION IN SYSTOLE: 2.48 CM (ref 2.1–4)
LEFT VENTRICLE DIASTOLIC VOLUME INDEX: 42.78 ML/M2
LEFT VENTRICLE DIASTOLIC VOLUME: 94.46 ML
LEFT VENTRICLE MASS INDEX: 76 G/M2
LEFT VENTRICLE SYSTOLIC VOLUME INDEX: 9.9 ML/M2
LEFT VENTRICLE SYSTOLIC VOLUME: 21.88 ML
LEFT VENTRICULAR INTERNAL DIMENSION IN DIASTOLE: 4.54 CM (ref 3.5–6)
LEFT VENTRICULAR MASS: 167.42 G
LV LATERAL E/E' RATIO: 8.07 M/S
LV SEPTAL E/E' RATIO: 11.3 M/S
MV PEAK A VEL: 0.7 M/S
MV PEAK E VEL: 1.13 M/S
MV STENOSIS PRESSURE HALF TIME: 66 MS
MV VALVE AREA P 1/2 METHOD: 3.33 CM2
PISA TR MAX VEL: 2.26 M/S
PULM VEIN S/D RATIO: 1.67
PV PEAK D VEL: 0.49 M/S
PV PEAK S VEL: 0.82 M/S
PV PEAK VELOCITY: 1.25 CM/S
RA MAJOR: 4.82 CM
RA PRESSURE: 3 MMHG
RA WIDTH: 3.96 CM
STJ: 2.59 CM
TDI LATERAL: 0.14 M/S
TDI SEPTAL: 0.1 M/S
TDI: 0.12 M/S
TR MAX PG: 20 MMHG
TRICUSPID ANNULAR PLANE SYSTOLIC EXCURSION: 2.32 CM
TV REST PULMONARY ARTERY PRESSURE: 23 MMHG

## 2020-09-22 PROCEDURE — 93306 TTE W/DOPPLER COMPLETE: CPT | Mod: 26,,, | Performed by: INTERNAL MEDICINE

## 2020-09-22 PROCEDURE — 93306 TTE W/DOPPLER COMPLETE: CPT

## 2020-09-22 PROCEDURE — 93010 ELECTROCARDIOGRAM REPORT: CPT | Mod: S$GLB,,, | Performed by: INTERNAL MEDICINE

## 2020-09-22 PROCEDURE — 93005 ELECTROCARDIOGRAM TRACING: CPT

## 2020-09-22 PROCEDURE — 93306 ECHO (CUPID ONLY): ICD-10-PCS | Mod: 26,,, | Performed by: INTERNAL MEDICINE

## 2020-09-22 PROCEDURE — 93010 EKG 12-LEAD: ICD-10-PCS | Mod: S$GLB,,, | Performed by: INTERNAL MEDICINE

## 2020-09-23 ENCOUNTER — PATIENT MESSAGE (OUTPATIENT)
Dept: FAMILY MEDICINE | Facility: CLINIC | Age: 50
End: 2020-09-23

## 2020-09-25 ENCOUNTER — PATIENT MESSAGE (OUTPATIENT)
Dept: FAMILY MEDICINE | Facility: CLINIC | Age: 50
End: 2020-09-25

## 2020-09-25 DIAGNOSIS — J30.2 SEASONAL ALLERGIES: Primary | ICD-10-CM

## 2020-09-25 RX ORDER — AZELASTINE 1 MG/ML
1 SPRAY, METERED NASAL 2 TIMES DAILY
Qty: 30 ML | Refills: 5 | Status: SHIPPED | OUTPATIENT
Start: 2020-09-25 | End: 2021-05-03

## 2020-09-27 ENCOUNTER — PATIENT MESSAGE (OUTPATIENT)
Dept: FAMILY MEDICINE | Facility: CLINIC | Age: 50
End: 2020-09-27

## 2020-09-28 ENCOUNTER — PATIENT MESSAGE (OUTPATIENT)
Dept: FAMILY MEDICINE | Facility: CLINIC | Age: 50
End: 2020-09-28

## 2020-09-30 ENCOUNTER — PATIENT MESSAGE (OUTPATIENT)
Dept: FAMILY MEDICINE | Facility: CLINIC | Age: 50
End: 2020-09-30

## 2021-01-28 ENCOUNTER — PATIENT MESSAGE (OUTPATIENT)
Dept: OBSTETRICS AND GYNECOLOGY | Facility: CLINIC | Age: 51
End: 2021-01-28

## 2021-02-08 ENCOUNTER — OFFICE VISIT (OUTPATIENT)
Dept: FAMILY MEDICINE | Facility: CLINIC | Age: 51
End: 2021-02-08
Payer: COMMERCIAL

## 2021-02-08 ENCOUNTER — PATIENT MESSAGE (OUTPATIENT)
Dept: FAMILY MEDICINE | Facility: CLINIC | Age: 51
End: 2021-02-08

## 2021-02-08 DIAGNOSIS — D64.9 NORMOCYTIC ANEMIA: ICD-10-CM

## 2021-02-08 DIAGNOSIS — F41.9 ANXIETY AND DEPRESSION: Primary | ICD-10-CM

## 2021-02-08 DIAGNOSIS — I10 ESSENTIAL HYPERTENSION: ICD-10-CM

## 2021-02-08 DIAGNOSIS — R73.03 PREDIABETES: ICD-10-CM

## 2021-02-08 DIAGNOSIS — R06.83 SNORING: ICD-10-CM

## 2021-02-08 DIAGNOSIS — H35.3290 NEOVASCULAR AGE-RELATED MACULAR DEGENERATION: ICD-10-CM

## 2021-02-08 DIAGNOSIS — F32.A ANXIETY AND DEPRESSION: Primary | ICD-10-CM

## 2021-02-08 DIAGNOSIS — H31.8 IDIOPATHIC POLYPOIDAL CHOROIDAL VASCULOPATHY: ICD-10-CM

## 2021-02-08 DIAGNOSIS — E66.01 SEVERE OBESITY (BMI >= 40): ICD-10-CM

## 2021-02-08 PROCEDURE — 99214 OFFICE O/P EST MOD 30 MIN: CPT | Mod: 95,,, | Performed by: FAMILY MEDICINE

## 2021-02-08 PROCEDURE — 99214 PR OFFICE/OUTPT VISIT, EST, LEVL IV, 30-39 MIN: ICD-10-PCS | Mod: 95,,, | Performed by: FAMILY MEDICINE

## 2021-02-08 RX ORDER — SERTRALINE HYDROCHLORIDE 25 MG/1
25 TABLET, FILM COATED ORAL DAILY
Qty: 30 TABLET | Refills: 1 | Status: SHIPPED | OUTPATIENT
Start: 2021-02-08 | End: 2021-03-02

## 2021-02-24 ENCOUNTER — PATIENT MESSAGE (OUTPATIENT)
Dept: FAMILY MEDICINE | Facility: CLINIC | Age: 51
End: 2021-02-24

## 2021-02-26 ENCOUNTER — OFFICE VISIT (OUTPATIENT)
Dept: FAMILY MEDICINE | Facility: CLINIC | Age: 51
End: 2021-02-26
Payer: COMMERCIAL

## 2021-02-26 ENCOUNTER — PATIENT MESSAGE (OUTPATIENT)
Dept: FAMILY MEDICINE | Facility: CLINIC | Age: 51
End: 2021-02-26

## 2021-02-26 DIAGNOSIS — R06.83 SNORING: ICD-10-CM

## 2021-02-26 DIAGNOSIS — D64.9 NORMOCYTIC ANEMIA: ICD-10-CM

## 2021-02-26 DIAGNOSIS — H31.8 IDIOPATHIC POLYPOIDAL CHOROIDAL VASCULOPATHY: ICD-10-CM

## 2021-02-26 DIAGNOSIS — H35.3290 NEOVASCULAR AGE-RELATED MACULAR DEGENERATION: ICD-10-CM

## 2021-02-26 DIAGNOSIS — E66.01 SEVERE OBESITY (BMI >= 40): ICD-10-CM

## 2021-02-26 DIAGNOSIS — F32.A ANXIETY AND DEPRESSION: Primary | ICD-10-CM

## 2021-02-26 DIAGNOSIS — I10 ESSENTIAL HYPERTENSION: ICD-10-CM

## 2021-02-26 DIAGNOSIS — R73.03 PREDIABETES: ICD-10-CM

## 2021-02-26 DIAGNOSIS — F41.9 ANXIETY AND DEPRESSION: Primary | ICD-10-CM

## 2021-02-26 PROCEDURE — 99214 OFFICE O/P EST MOD 30 MIN: CPT | Mod: 95,,, | Performed by: FAMILY MEDICINE

## 2021-02-26 PROCEDURE — 99214 PR OFFICE/OUTPT VISIT, EST, LEVL IV, 30-39 MIN: ICD-10-PCS | Mod: 95,,, | Performed by: FAMILY MEDICINE

## 2021-03-19 ENCOUNTER — PATIENT MESSAGE (OUTPATIENT)
Dept: FAMILY MEDICINE | Facility: CLINIC | Age: 51
End: 2021-03-19

## 2021-03-22 ENCOUNTER — OFFICE VISIT (OUTPATIENT)
Dept: OBSTETRICS AND GYNECOLOGY | Facility: CLINIC | Age: 51
End: 2021-03-22
Payer: COMMERCIAL

## 2021-03-22 VITALS
DIASTOLIC BLOOD PRESSURE: 78 MMHG | WEIGHT: 266.75 LBS | HEIGHT: 64 IN | BODY MASS INDEX: 45.54 KG/M2 | SYSTOLIC BLOOD PRESSURE: 144 MMHG

## 2021-03-22 DIAGNOSIS — Z12.31 SCREENING MAMMOGRAM, ENCOUNTER FOR: ICD-10-CM

## 2021-03-22 DIAGNOSIS — N61.1 BREAST ABSCESS: ICD-10-CM

## 2021-03-22 DIAGNOSIS — Z01.419 WELL WOMAN EXAM WITH ROUTINE GYNECOLOGICAL EXAM: Primary | ICD-10-CM

## 2021-03-22 DIAGNOSIS — N89.8 VAGINAL DISCHARGE: ICD-10-CM

## 2021-03-22 PROCEDURE — 87661 TRICHOMONAS VAGINALIS AMPLIF: CPT | Performed by: OBSTETRICS & GYNECOLOGY

## 2021-03-22 PROCEDURE — 3078F PR MOST RECENT DIASTOLIC BLOOD PRESSURE < 80 MM HG: ICD-10-PCS | Mod: CPTII,S$GLB,, | Performed by: OBSTETRICS & GYNECOLOGY

## 2021-03-22 PROCEDURE — 3078F DIAST BP <80 MM HG: CPT | Mod: CPTII,S$GLB,, | Performed by: OBSTETRICS & GYNECOLOGY

## 2021-03-22 PROCEDURE — 99999 PR PBB SHADOW E&M-EST. PATIENT-LVL III: ICD-10-PCS | Mod: PBBFAC,,, | Performed by: OBSTETRICS & GYNECOLOGY

## 2021-03-22 PROCEDURE — 3077F PR MOST RECENT SYSTOLIC BLOOD PRESSURE >= 140 MM HG: ICD-10-PCS | Mod: CPTII,S$GLB,, | Performed by: OBSTETRICS & GYNECOLOGY

## 2021-03-22 PROCEDURE — 87481 CANDIDA DNA AMP PROBE: CPT | Mod: 59 | Performed by: OBSTETRICS & GYNECOLOGY

## 2021-03-22 PROCEDURE — 99999 PR PBB SHADOW E&M-EST. PATIENT-LVL III: CPT | Mod: PBBFAC,,, | Performed by: OBSTETRICS & GYNECOLOGY

## 2021-03-22 PROCEDURE — 3008F PR BODY MASS INDEX (BMI) DOCUMENTED: ICD-10-PCS | Mod: CPTII,S$GLB,, | Performed by: OBSTETRICS & GYNECOLOGY

## 2021-03-22 PROCEDURE — 1126F AMNT PAIN NOTED NONE PRSNT: CPT | Mod: S$GLB,,, | Performed by: OBSTETRICS & GYNECOLOGY

## 2021-03-22 PROCEDURE — 99386 PREV VISIT NEW AGE 40-64: CPT | Mod: S$GLB,,, | Performed by: OBSTETRICS & GYNECOLOGY

## 2021-03-22 PROCEDURE — 3008F BODY MASS INDEX DOCD: CPT | Mod: CPTII,S$GLB,, | Performed by: OBSTETRICS & GYNECOLOGY

## 2021-03-22 PROCEDURE — 3077F SYST BP >= 140 MM HG: CPT | Mod: CPTII,S$GLB,, | Performed by: OBSTETRICS & GYNECOLOGY

## 2021-03-22 PROCEDURE — 1126F PR PAIN SEVERITY QUANTIFIED, NO PAIN PRESENT: ICD-10-PCS | Mod: S$GLB,,, | Performed by: OBSTETRICS & GYNECOLOGY

## 2021-03-22 PROCEDURE — 99386 PR PREVENTIVE VISIT,NEW,40-64: ICD-10-PCS | Mod: S$GLB,,, | Performed by: OBSTETRICS & GYNECOLOGY

## 2021-03-22 RX ORDER — TRIAMCINOLONE ACETONIDE 1 MG/G
OINTMENT TOPICAL
COMMUNITY
Start: 2021-02-02 | End: 2022-11-14

## 2021-03-22 RX ORDER — NAFTIFINE HYDROCHLORIDE 20 MG/G
CREAM TOPICAL
COMMUNITY
Start: 2021-02-14 | End: 2022-02-10

## 2021-03-22 RX ORDER — MUPIROCIN 20 MG/G
OINTMENT TOPICAL
COMMUNITY
Start: 2021-02-13 | End: 2022-02-10

## 2021-03-25 ENCOUNTER — PATIENT MESSAGE (OUTPATIENT)
Dept: OBSTETRICS AND GYNECOLOGY | Facility: CLINIC | Age: 51
End: 2021-03-25

## 2021-03-25 DIAGNOSIS — B96.89 BACTERIAL VAGINOSIS: Primary | ICD-10-CM

## 2021-03-25 DIAGNOSIS — N76.0 BACTERIAL VAGINOSIS: Primary | ICD-10-CM

## 2021-03-25 LAB
BACTERIAL VAGINOSIS DNA: POSITIVE
CANDIDA GLABRATA DNA: NEGATIVE
CANDIDA KRUSEI DNA: NEGATIVE
CANDIDA RRNA VAG QL PROBE: NEGATIVE
T VAGINALIS RRNA GENITAL QL PROBE: NEGATIVE

## 2021-03-25 RX ORDER — METRONIDAZOLE 500 MG/1
500 TABLET ORAL EVERY 12 HOURS
Qty: 14 TABLET | Refills: 0 | Status: SHIPPED | OUTPATIENT
Start: 2021-03-25 | End: 2021-05-24

## 2021-03-26 ENCOUNTER — OFFICE VISIT (OUTPATIENT)
Dept: FAMILY MEDICINE | Facility: CLINIC | Age: 51
End: 2021-03-26
Payer: COMMERCIAL

## 2021-03-26 DIAGNOSIS — E66.01 SEVERE OBESITY (BMI >= 40): ICD-10-CM

## 2021-03-26 DIAGNOSIS — H31.8 IDIOPATHIC POLYPOIDAL CHOROIDAL VASCULOPATHY: ICD-10-CM

## 2021-03-26 DIAGNOSIS — F41.9 ANXIETY AND DEPRESSION: Primary | ICD-10-CM

## 2021-03-26 DIAGNOSIS — R06.83 SNORING: ICD-10-CM

## 2021-03-26 DIAGNOSIS — F32.A ANXIETY AND DEPRESSION: Primary | ICD-10-CM

## 2021-03-26 DIAGNOSIS — R73.03 PREDIABETES: ICD-10-CM

## 2021-03-26 DIAGNOSIS — H35.3290 NEOVASCULAR AGE-RELATED MACULAR DEGENERATION: ICD-10-CM

## 2021-03-26 DIAGNOSIS — I10 ESSENTIAL HYPERTENSION: ICD-10-CM

## 2021-03-26 DIAGNOSIS — D64.9 NORMOCYTIC ANEMIA: ICD-10-CM

## 2021-03-26 DIAGNOSIS — E55.9 VITAMIN D DEFICIENCY: ICD-10-CM

## 2021-03-26 PROCEDURE — 99214 OFFICE O/P EST MOD 30 MIN: CPT | Mod: 95,,, | Performed by: FAMILY MEDICINE

## 2021-03-26 PROCEDURE — 99214 PR OFFICE/OUTPT VISIT, EST, LEVL IV, 30-39 MIN: ICD-10-PCS | Mod: 95,,, | Performed by: FAMILY MEDICINE

## 2021-03-26 RX ORDER — SERTRALINE HYDROCHLORIDE 25 MG/1
25 TABLET, FILM COATED ORAL DAILY
Qty: 90 TABLET | Refills: 0 | Status: SHIPPED | OUTPATIENT
Start: 2021-03-26 | End: 2021-06-23

## 2021-03-29 ENCOUNTER — PATIENT MESSAGE (OUTPATIENT)
Dept: FAMILY MEDICINE | Facility: CLINIC | Age: 51
End: 2021-03-29

## 2021-03-30 ENCOUNTER — IMMUNIZATION (OUTPATIENT)
Dept: OBSTETRICS AND GYNECOLOGY | Facility: CLINIC | Age: 51
End: 2021-03-30
Payer: COMMERCIAL

## 2021-03-30 DIAGNOSIS — Z23 NEED FOR VACCINATION: Primary | ICD-10-CM

## 2021-03-30 PROCEDURE — 91300 COVID-19, MRNA, LNP-S, PF, 30 MCG/0.3 ML DOSE VACCINE: CPT | Mod: PBBFAC | Performed by: NURSE PRACTITIONER

## 2021-04-08 ENCOUNTER — OFFICE VISIT (OUTPATIENT)
Dept: PULMONOLOGY | Facility: CLINIC | Age: 51
End: 2021-04-08
Payer: COMMERCIAL

## 2021-04-08 VITALS
HEART RATE: 69 BPM | SYSTOLIC BLOOD PRESSURE: 124 MMHG | WEIGHT: 266.13 LBS | BODY MASS INDEX: 45.43 KG/M2 | OXYGEN SATURATION: 97 % | DIASTOLIC BLOOD PRESSURE: 79 MMHG | HEIGHT: 64 IN | TEMPERATURE: 97 F

## 2021-04-08 DIAGNOSIS — R09.81 NASAL CONGESTION: ICD-10-CM

## 2021-04-08 DIAGNOSIS — R06.83 SNORING: ICD-10-CM

## 2021-04-08 DIAGNOSIS — G47.33 OSA (OBSTRUCTIVE SLEEP APNEA): ICD-10-CM

## 2021-04-08 DIAGNOSIS — E66.01 CLASS 3 SEVERE OBESITY DUE TO EXCESS CALORIES WITH SERIOUS COMORBIDITY AND BODY MASS INDEX (BMI) OF 45.0 TO 49.9 IN ADULT: ICD-10-CM

## 2021-04-08 PROBLEM — E66.813 CLASS 3 SEVERE OBESITY DUE TO EXCESS CALORIES WITH SERIOUS COMORBIDITY IN ADULT: Status: ACTIVE | Noted: 2020-01-30

## 2021-04-08 PROCEDURE — 1126F PR PAIN SEVERITY QUANTIFIED, NO PAIN PRESENT: ICD-10-PCS | Mod: S$GLB,,, | Performed by: INTERNAL MEDICINE

## 2021-04-08 PROCEDURE — 3008F BODY MASS INDEX DOCD: CPT | Mod: CPTII,S$GLB,, | Performed by: INTERNAL MEDICINE

## 2021-04-08 PROCEDURE — 99999 PR PBB SHADOW E&M-EST. PATIENT-LVL IV: ICD-10-PCS | Mod: PBBFAC,,, | Performed by: INTERNAL MEDICINE

## 2021-04-08 PROCEDURE — 3008F PR BODY MASS INDEX (BMI) DOCUMENTED: ICD-10-PCS | Mod: CPTII,S$GLB,, | Performed by: INTERNAL MEDICINE

## 2021-04-08 PROCEDURE — 1126F AMNT PAIN NOTED NONE PRSNT: CPT | Mod: S$GLB,,, | Performed by: INTERNAL MEDICINE

## 2021-04-08 PROCEDURE — 99204 OFFICE O/P NEW MOD 45 MIN: CPT | Mod: S$GLB,,, | Performed by: INTERNAL MEDICINE

## 2021-04-08 PROCEDURE — 99204 PR OFFICE/OUTPT VISIT, NEW, LEVL IV, 45-59 MIN: ICD-10-PCS | Mod: S$GLB,,, | Performed by: INTERNAL MEDICINE

## 2021-04-08 PROCEDURE — 99999 PR PBB SHADOW E&M-EST. PATIENT-LVL IV: CPT | Mod: PBBFAC,,, | Performed by: INTERNAL MEDICINE

## 2021-04-08 RX ORDER — TERBINAFINE HYDROCHLORIDE 250 MG/1
250 TABLET ORAL DAILY
COMMUNITY
Start: 2021-03-23 | End: 2023-01-26

## 2021-04-19 ENCOUNTER — PATIENT MESSAGE (OUTPATIENT)
Dept: PULMONOLOGY | Facility: CLINIC | Age: 51
End: 2021-04-19

## 2021-04-20 ENCOUNTER — IMMUNIZATION (OUTPATIENT)
Dept: OBSTETRICS AND GYNECOLOGY | Facility: CLINIC | Age: 51
End: 2021-04-20
Payer: COMMERCIAL

## 2021-04-20 DIAGNOSIS — Z23 NEED FOR VACCINATION: Primary | ICD-10-CM

## 2021-04-20 PROCEDURE — 0002A COVID-19, MRNA, LNP-S, PF, 30 MCG/0.3 ML DOSE VACCINE: ICD-10-PCS | Mod: CV19,S$GLB,, | Performed by: FAMILY MEDICINE

## 2021-04-20 PROCEDURE — 91300 COVID-19, MRNA, LNP-S, PF, 30 MCG/0.3 ML DOSE VACCINE: CPT | Mod: S$GLB,,, | Performed by: FAMILY MEDICINE

## 2021-04-20 PROCEDURE — 91300 COVID-19, MRNA, LNP-S, PF, 30 MCG/0.3 ML DOSE VACCINE: ICD-10-PCS | Mod: S$GLB,,, | Performed by: FAMILY MEDICINE

## 2021-04-20 PROCEDURE — 0002A COVID-19, MRNA, LNP-S, PF, 30 MCG/0.3 ML DOSE VACCINE: CPT | Mod: CV19,S$GLB,, | Performed by: FAMILY MEDICINE

## 2021-04-23 ENCOUNTER — HOSPITAL ENCOUNTER (OUTPATIENT)
Dept: RADIOLOGY | Facility: HOSPITAL | Age: 51
Discharge: HOME | End: 2021-04-23
Attending: OBSTETRICS & GYNECOLOGY
Payer: COMMERCIAL

## 2021-04-23 ENCOUNTER — HOSPITAL ENCOUNTER (OUTPATIENT)
Dept: SLEEP MEDICINE | Facility: HOSPITAL | Age: 51
Discharge: HOME | End: 2021-04-23
Attending: INTERNAL MEDICINE
Payer: COMMERCIAL

## 2021-04-23 DIAGNOSIS — G47.33 OSA (OBSTRUCTIVE SLEEP APNEA): ICD-10-CM

## 2021-04-23 DIAGNOSIS — Z12.31 SCREENING MAMMOGRAM, ENCOUNTER FOR: ICD-10-CM

## 2021-04-23 PROCEDURE — 95800 PR SLEEP STUDY, UNATTENDED, RECORD HEART RATE/O2 SAT/RESP ANAL/SLEEP TIME: ICD-10-PCS | Mod: 26,,, | Performed by: INTERNAL MEDICINE

## 2021-04-23 PROCEDURE — 77067 SCR MAMMO BI INCL CAD: CPT | Mod: 26,,, | Performed by: RADIOLOGY

## 2021-04-23 PROCEDURE — 77063 BREAST TOMOSYNTHESIS BI: CPT | Mod: 26,,, | Performed by: RADIOLOGY

## 2021-04-23 PROCEDURE — 77063 MAMMO DIGITAL SCREENING BILAT WITH TOMO: ICD-10-PCS | Mod: 26,,, | Performed by: RADIOLOGY

## 2021-04-23 PROCEDURE — 95800 SLP STDY UNATTENDED: CPT | Mod: 26,,, | Performed by: INTERNAL MEDICINE

## 2021-04-23 PROCEDURE — 77067 MAMMO DIGITAL SCREENING BILAT WITH TOMO: ICD-10-PCS | Mod: 26,,, | Performed by: RADIOLOGY

## 2021-04-23 PROCEDURE — 77067 SCR MAMMO BI INCL CAD: CPT | Mod: TC

## 2021-04-23 PROCEDURE — 95800 SLP STDY UNATTENDED: CPT

## 2021-04-27 ENCOUNTER — TELEPHONE (OUTPATIENT)
Dept: PULMONOLOGY | Facility: CLINIC | Age: 51
End: 2021-04-27

## 2021-05-05 ENCOUNTER — OFFICE VISIT (OUTPATIENT)
Dept: PULMONOLOGY | Facility: CLINIC | Age: 51
End: 2021-05-05
Payer: COMMERCIAL

## 2021-05-05 VITALS
HEART RATE: 66 BPM | BODY MASS INDEX: 44.8 KG/M2 | OXYGEN SATURATION: 97 % | WEIGHT: 262.44 LBS | HEIGHT: 64 IN | DIASTOLIC BLOOD PRESSURE: 73 MMHG | SYSTOLIC BLOOD PRESSURE: 109 MMHG

## 2021-05-05 DIAGNOSIS — R09.81 NASAL CONGESTION: ICD-10-CM

## 2021-05-05 DIAGNOSIS — G47.33 OSA (OBSTRUCTIVE SLEEP APNEA): ICD-10-CM

## 2021-05-05 PROCEDURE — 99999 PR PBB SHADOW E&M-EST. PATIENT-LVL III: ICD-10-PCS | Mod: PBBFAC,,, | Performed by: INTERNAL MEDICINE

## 2021-05-05 PROCEDURE — 3008F PR BODY MASS INDEX (BMI) DOCUMENTED: ICD-10-PCS | Mod: CPTII,S$GLB,, | Performed by: INTERNAL MEDICINE

## 2021-05-05 PROCEDURE — 3008F BODY MASS INDEX DOCD: CPT | Mod: CPTII,S$GLB,, | Performed by: INTERNAL MEDICINE

## 2021-05-05 PROCEDURE — 1126F AMNT PAIN NOTED NONE PRSNT: CPT | Mod: S$GLB,,, | Performed by: INTERNAL MEDICINE

## 2021-05-05 PROCEDURE — 99214 PR OFFICE/OUTPT VISIT, EST, LEVL IV, 30-39 MIN: ICD-10-PCS | Mod: S$GLB,,, | Performed by: INTERNAL MEDICINE

## 2021-05-05 PROCEDURE — 99214 OFFICE O/P EST MOD 30 MIN: CPT | Mod: S$GLB,,, | Performed by: INTERNAL MEDICINE

## 2021-05-05 PROCEDURE — 99999 PR PBB SHADOW E&M-EST. PATIENT-LVL III: CPT | Mod: PBBFAC,,, | Performed by: INTERNAL MEDICINE

## 2021-05-05 PROCEDURE — 1126F PR PAIN SEVERITY QUANTIFIED, NO PAIN PRESENT: ICD-10-PCS | Mod: S$GLB,,, | Performed by: INTERNAL MEDICINE

## 2021-05-18 ENCOUNTER — TELEPHONE (OUTPATIENT)
Dept: PULMONOLOGY | Facility: CLINIC | Age: 51
End: 2021-05-18

## 2021-05-24 ENCOUNTER — OFFICE VISIT (OUTPATIENT)
Dept: OBSTETRICS AND GYNECOLOGY | Facility: CLINIC | Age: 51
End: 2021-05-24
Payer: COMMERCIAL

## 2021-05-24 VITALS
BODY MASS INDEX: 44.79 KG/M2 | SYSTOLIC BLOOD PRESSURE: 116 MMHG | HEIGHT: 64 IN | DIASTOLIC BLOOD PRESSURE: 66 MMHG | WEIGHT: 262.38 LBS

## 2021-05-24 DIAGNOSIS — N92.3 INTERMENSTRUAL BLEEDING: ICD-10-CM

## 2021-05-24 DIAGNOSIS — N89.8 VAGINAL DISCHARGE: Primary | ICD-10-CM

## 2021-05-24 DIAGNOSIS — N90.7 INCLUSION CYST OF VULVA: ICD-10-CM

## 2021-05-24 PROCEDURE — 3008F BODY MASS INDEX DOCD: CPT | Mod: CPTII,S$GLB,, | Performed by: OBSTETRICS & GYNECOLOGY

## 2021-05-24 PROCEDURE — 99213 PR OFFICE/OUTPT VISIT, EST, LEVL III, 20-29 MIN: ICD-10-PCS | Mod: S$GLB,,, | Performed by: OBSTETRICS & GYNECOLOGY

## 2021-05-24 PROCEDURE — 87491 CHLMYD TRACH DNA AMP PROBE: CPT | Performed by: OBSTETRICS & GYNECOLOGY

## 2021-05-24 PROCEDURE — 1126F AMNT PAIN NOTED NONE PRSNT: CPT | Mod: S$GLB,,, | Performed by: OBSTETRICS & GYNECOLOGY

## 2021-05-24 PROCEDURE — 99213 OFFICE O/P EST LOW 20 MIN: CPT | Mod: S$GLB,,, | Performed by: OBSTETRICS & GYNECOLOGY

## 2021-05-24 PROCEDURE — 3008F PR BODY MASS INDEX (BMI) DOCUMENTED: ICD-10-PCS | Mod: CPTII,S$GLB,, | Performed by: OBSTETRICS & GYNECOLOGY

## 2021-05-24 PROCEDURE — 99999 PR PBB SHADOW E&M-EST. PATIENT-LVL III: ICD-10-PCS | Mod: PBBFAC,,, | Performed by: OBSTETRICS & GYNECOLOGY

## 2021-05-24 PROCEDURE — 99999 PR PBB SHADOW E&M-EST. PATIENT-LVL III: CPT | Mod: PBBFAC,,, | Performed by: OBSTETRICS & GYNECOLOGY

## 2021-05-24 PROCEDURE — 1126F PR PAIN SEVERITY QUANTIFIED, NO PAIN PRESENT: ICD-10-PCS | Mod: S$GLB,,, | Performed by: OBSTETRICS & GYNECOLOGY

## 2021-05-24 PROCEDURE — 87591 N.GONORRHOEAE DNA AMP PROB: CPT | Performed by: OBSTETRICS & GYNECOLOGY

## 2021-05-24 RX ORDER — CICLOPIROX 80 MG/ML
SOLUTION TOPICAL
COMMUNITY
Start: 2021-04-27 | End: 2022-02-10

## 2021-05-26 LAB
C TRACH DNA SPEC QL NAA+PROBE: NOT DETECTED
N GONORRHOEA DNA SPEC QL NAA+PROBE: NOT DETECTED

## 2021-05-31 ENCOUNTER — PATIENT MESSAGE (OUTPATIENT)
Dept: OBSTETRICS AND GYNECOLOGY | Facility: CLINIC | Age: 51
End: 2021-05-31

## 2021-06-01 ENCOUNTER — HOSPITAL ENCOUNTER (OUTPATIENT)
Dept: RADIOLOGY | Facility: HOSPITAL | Age: 51
Discharge: HOME | End: 2021-06-01
Attending: OBSTETRICS & GYNECOLOGY
Payer: COMMERCIAL

## 2021-06-01 DIAGNOSIS — N92.3 INTERMENSTRUAL BLEEDING: ICD-10-CM

## 2021-06-01 PROCEDURE — 76856 US EXAM PELVIC COMPLETE: CPT | Mod: 26,,, | Performed by: RADIOLOGY

## 2021-06-01 PROCEDURE — 76830 TRANSVAGINAL US NON-OB: CPT | Mod: 26,,, | Performed by: RADIOLOGY

## 2021-06-01 PROCEDURE — 76830 US PELVIS COMP WITH TRANSVAG NON-OB (XPD): ICD-10-PCS | Mod: 26,,, | Performed by: RADIOLOGY

## 2021-06-01 PROCEDURE — 76856 US EXAM PELVIC COMPLETE: CPT | Mod: TC

## 2021-06-01 PROCEDURE — 76856 US PELVIS COMP WITH TRANSVAG NON-OB (XPD): ICD-10-PCS | Mod: 26,,, | Performed by: RADIOLOGY

## 2021-08-03 ENCOUNTER — PATIENT MESSAGE (OUTPATIENT)
Dept: PULMONOLOGY | Facility: CLINIC | Age: 51
End: 2021-08-03

## 2021-08-03 ENCOUNTER — TELEPHONE (OUTPATIENT)
Dept: PULMONOLOGY | Facility: CLINIC | Age: 51
End: 2021-08-03

## 2021-09-22 ENCOUNTER — PATIENT OUTREACH (OUTPATIENT)
Dept: ADMINISTRATIVE | Facility: HOSPITAL | Age: 51
End: 2021-09-22

## 2021-10-06 ENCOUNTER — LAB VISIT (OUTPATIENT)
Dept: PRIMARY CARE CLINIC | Facility: CLINIC | Age: 51
End: 2021-10-06
Payer: COMMERCIAL

## 2021-10-06 ENCOUNTER — OFFICE VISIT (OUTPATIENT)
Dept: PRIMARY CARE CLINIC | Facility: CLINIC | Age: 51
End: 2021-10-06
Payer: COMMERCIAL

## 2021-10-06 VITALS
TEMPERATURE: 98 F | WEIGHT: 272.19 LBS | HEIGHT: 64 IN | SYSTOLIC BLOOD PRESSURE: 122 MMHG | BODY MASS INDEX: 46.47 KG/M2 | HEART RATE: 74 BPM | DIASTOLIC BLOOD PRESSURE: 60 MMHG | OXYGEN SATURATION: 99 % | RESPIRATION RATE: 18 BRPM

## 2021-10-06 DIAGNOSIS — G47.33 OSA (OBSTRUCTIVE SLEEP APNEA): ICD-10-CM

## 2021-10-06 DIAGNOSIS — H31.8 IDIOPATHIC POLYPOIDAL CHOROIDAL VASCULOPATHY: ICD-10-CM

## 2021-10-06 DIAGNOSIS — Z00.00 ANNUAL PHYSICAL EXAM: Primary | ICD-10-CM

## 2021-10-06 DIAGNOSIS — D64.9 NORMOCYTIC ANEMIA: ICD-10-CM

## 2021-10-06 DIAGNOSIS — E87.6 HYPOKALEMIA: ICD-10-CM

## 2021-10-06 DIAGNOSIS — E55.9 VITAMIN D DEFICIENCY: ICD-10-CM

## 2021-10-06 DIAGNOSIS — Z00.00 ANNUAL PHYSICAL EXAM: ICD-10-CM

## 2021-10-06 DIAGNOSIS — I10 ESSENTIAL HYPERTENSION: Chronic | ICD-10-CM

## 2021-10-06 DIAGNOSIS — F32.A ANXIETY AND DEPRESSION: ICD-10-CM

## 2021-10-06 DIAGNOSIS — J30.2 SEASONAL ALLERGIES: ICD-10-CM

## 2021-10-06 DIAGNOSIS — R73.03 PREDIABETES: ICD-10-CM

## 2021-10-06 DIAGNOSIS — E66.01 CLASS 3 SEVERE OBESITY DUE TO EXCESS CALORIES WITH SERIOUS COMORBIDITY AND BODY MASS INDEX (BMI) OF 45.0 TO 49.9 IN ADULT: ICD-10-CM

## 2021-10-06 DIAGNOSIS — H35.3290 NEOVASCULAR AGE-RELATED MACULAR DEGENERATION: ICD-10-CM

## 2021-10-06 DIAGNOSIS — F41.9 ANXIETY AND DEPRESSION: ICD-10-CM

## 2021-10-06 LAB
25(OH)D3+25(OH)D2 SERPL-MCNC: 17 NG/ML (ref 30–96)
ALBUMIN SERPL BCP-MCNC: 3.9 G/DL (ref 3.5–5.2)
ALP SERPL-CCNC: 70 U/L (ref 55–135)
ALT SERPL W/O P-5'-P-CCNC: 15 U/L (ref 10–44)
ANION GAP SERPL CALC-SCNC: 15 MMOL/L (ref 8–16)
AST SERPL-CCNC: 23 U/L (ref 10–40)
BASOPHILS # BLD AUTO: 0.04 K/UL (ref 0–0.2)
BASOPHILS NFR BLD: 0.4 % (ref 0–1.9)
BILIRUB SERPL-MCNC: 0.6 MG/DL (ref 0.1–1)
BUN SERPL-MCNC: 9 MG/DL (ref 6–20)
CALCIUM SERPL-MCNC: 9.7 MG/DL (ref 8.7–10.5)
CHLORIDE SERPL-SCNC: 103 MMOL/L (ref 95–110)
CHOLEST SERPL-MCNC: 200 MG/DL (ref 120–199)
CHOLEST/HDLC SERPL: 2.3 {RATIO} (ref 2–5)
CO2 SERPL-SCNC: 20 MMOL/L (ref 23–29)
CREAT SERPL-MCNC: 0.8 MG/DL (ref 0.5–1.4)
DIFFERENTIAL METHOD: ABNORMAL
EOSINOPHIL # BLD AUTO: 0 K/UL (ref 0–0.5)
EOSINOPHIL NFR BLD: 0.3 % (ref 0–8)
ERYTHROCYTE [DISTWIDTH] IN BLOOD BY AUTOMATED COUNT: 17 % (ref 11.5–14.5)
EST. GFR  (AFRICAN AMERICAN): >60 ML/MIN/1.73 M^2
EST. GFR  (NON AFRICAN AMERICAN): >60 ML/MIN/1.73 M^2
ESTIMATED AVG GLUCOSE: 123 MG/DL (ref 68–131)
FERRITIN SERPL-MCNC: 24 NG/ML (ref 20–300)
GLUCOSE SERPL-MCNC: 83 MG/DL (ref 70–110)
HBA1C MFR BLD: 5.9 % (ref 4–5.6)
HCT VFR BLD AUTO: 33.3 % (ref 37–48.5)
HDLC SERPL-MCNC: 88 MG/DL (ref 40–75)
HDLC SERPL: 44 % (ref 20–50)
HGB BLD-MCNC: 10.1 G/DL (ref 12–16)
IMM GRANULOCYTES # BLD AUTO: 0.03 K/UL (ref 0–0.04)
IMM GRANULOCYTES NFR BLD AUTO: 0.3 % (ref 0–0.5)
IRON SERPL-MCNC: 32 UG/DL (ref 30–160)
LDLC SERPL CALC-MCNC: 101 MG/DL (ref 63–159)
LYMPHOCYTES # BLD AUTO: 2.4 K/UL (ref 1–4.8)
LYMPHOCYTES NFR BLD: 23.9 % (ref 18–48)
MCH RBC QN AUTO: 24.5 PG (ref 27–31)
MCHC RBC AUTO-ENTMCNC: 30.3 G/DL (ref 32–36)
MCV RBC AUTO: 81 FL (ref 82–98)
MONOCYTES # BLD AUTO: 0.7 K/UL (ref 0.3–1)
MONOCYTES NFR BLD: 6.4 % (ref 4–15)
NEUTROPHILS # BLD AUTO: 6.9 K/UL (ref 1.8–7.7)
NEUTROPHILS NFR BLD: 68.7 % (ref 38–73)
NONHDLC SERPL-MCNC: 112 MG/DL
NRBC BLD-RTO: 0 /100 WBC
PLATELET # BLD AUTO: 480 K/UL (ref 150–450)
PMV BLD AUTO: 10.8 FL (ref 9.2–12.9)
POTASSIUM SERPL-SCNC: 3.6 MMOL/L (ref 3.5–5.1)
PROT SERPL-MCNC: 7.7 G/DL (ref 6–8.4)
RBC # BLD AUTO: 4.13 M/UL (ref 4–5.4)
SATURATED IRON: 7 % (ref 20–50)
SODIUM SERPL-SCNC: 138 MMOL/L (ref 136–145)
TOTAL IRON BINDING CAPACITY: 465 UG/DL (ref 250–450)
TRANSFERRIN SERPL-MCNC: 314 MG/DL (ref 200–375)
TRIGL SERPL-MCNC: 55 MG/DL (ref 30–150)
WBC # BLD AUTO: 10.09 K/UL (ref 3.9–12.7)

## 2021-10-06 PROCEDURE — 36415 COLL VENOUS BLD VENIPUNCTURE: CPT | Performed by: FAMILY MEDICINE

## 2021-10-06 PROCEDURE — 3078F DIAST BP <80 MM HG: CPT | Mod: CPTII,S$GLB,, | Performed by: FAMILY MEDICINE

## 2021-10-06 PROCEDURE — 99999 PR PBB SHADOW E&M-EST. PATIENT-LVL V: CPT | Mod: PBBFAC,,, | Performed by: FAMILY MEDICINE

## 2021-10-06 PROCEDURE — 3044F HG A1C LEVEL LT 7.0%: CPT | Mod: CPTII,S$GLB,, | Performed by: FAMILY MEDICINE

## 2021-10-06 PROCEDURE — 99396 PR PREVENTIVE VISIT,EST,40-64: ICD-10-PCS | Mod: S$GLB,,, | Performed by: FAMILY MEDICINE

## 2021-10-06 PROCEDURE — 99396 PREV VISIT EST AGE 40-64: CPT | Mod: S$GLB,,, | Performed by: FAMILY MEDICINE

## 2021-10-06 PROCEDURE — 85025 COMPLETE CBC W/AUTO DIFF WBC: CPT | Performed by: FAMILY MEDICINE

## 2021-10-06 PROCEDURE — 1159F PR MEDICATION LIST DOCUMENTED IN MEDICAL RECORD: ICD-10-PCS | Mod: CPTII,S$GLB,, | Performed by: FAMILY MEDICINE

## 2021-10-06 PROCEDURE — 83036 HEMOGLOBIN GLYCOSYLATED A1C: CPT | Performed by: FAMILY MEDICINE

## 2021-10-06 PROCEDURE — 36415 PR COLLECTION VENOUS BLOOD,VENIPUNCTURE: ICD-10-PCS | Mod: S$GLB,,, | Performed by: FAMILY MEDICINE

## 2021-10-06 PROCEDURE — 36415 COLL VENOUS BLD VENIPUNCTURE: CPT | Mod: S$GLB,,, | Performed by: FAMILY MEDICINE

## 2021-10-06 PROCEDURE — 1159F MED LIST DOCD IN RCRD: CPT | Mod: CPTII,S$GLB,, | Performed by: FAMILY MEDICINE

## 2021-10-06 PROCEDURE — 3008F BODY MASS INDEX DOCD: CPT | Mod: CPTII,S$GLB,, | Performed by: FAMILY MEDICINE

## 2021-10-06 PROCEDURE — 80053 COMPREHEN METABOLIC PANEL: CPT | Performed by: FAMILY MEDICINE

## 2021-10-06 PROCEDURE — 80061 LIPID PANEL: CPT | Performed by: FAMILY MEDICINE

## 2021-10-06 PROCEDURE — 99999 PR PBB SHADOW E&M-EST. PATIENT-LVL V: ICD-10-PCS | Mod: PBBFAC,,, | Performed by: FAMILY MEDICINE

## 2021-10-06 PROCEDURE — 82306 VITAMIN D 25 HYDROXY: CPT | Performed by: FAMILY MEDICINE

## 2021-10-06 PROCEDURE — 3044F PR MOST RECENT HEMOGLOBIN A1C LEVEL <7.0%: ICD-10-PCS | Mod: CPTII,S$GLB,, | Performed by: FAMILY MEDICINE

## 2021-10-06 PROCEDURE — 3074F SYST BP LT 130 MM HG: CPT | Mod: CPTII,S$GLB,, | Performed by: FAMILY MEDICINE

## 2021-10-06 PROCEDURE — 3008F PR BODY MASS INDEX (BMI) DOCUMENTED: ICD-10-PCS | Mod: CPTII,S$GLB,, | Performed by: FAMILY MEDICINE

## 2021-10-06 PROCEDURE — 84466 ASSAY OF TRANSFERRIN: CPT | Performed by: FAMILY MEDICINE

## 2021-10-06 PROCEDURE — 82728 ASSAY OF FERRITIN: CPT | Performed by: FAMILY MEDICINE

## 2021-10-06 PROCEDURE — 3078F PR MOST RECENT DIASTOLIC BLOOD PRESSURE < 80 MM HG: ICD-10-PCS | Mod: CPTII,S$GLB,, | Performed by: FAMILY MEDICINE

## 2021-10-06 PROCEDURE — 3074F PR MOST RECENT SYSTOLIC BLOOD PRESSURE < 130 MM HG: ICD-10-PCS | Mod: CPTII,S$GLB,, | Performed by: FAMILY MEDICINE

## 2021-10-06 RX ORDER — CHLORTHALIDONE 25 MG/1
25 TABLET ORAL DAILY
Qty: 90 TABLET | Refills: 0 | Status: SHIPPED | OUTPATIENT
Start: 2021-10-06 | End: 2022-03-31

## 2021-10-06 RX ORDER — SERTRALINE HYDROCHLORIDE 25 MG/1
50 TABLET, FILM COATED ORAL DAILY
Qty: 90 TABLET | Refills: 0
Start: 2021-10-06 | End: 2022-01-27

## 2021-10-06 RX ORDER — POTASSIUM CHLORIDE 750 MG/1
20 TABLET, EXTENDED RELEASE ORAL DAILY
Qty: 180 TABLET | Refills: 0 | Status: SHIPPED | OUTPATIENT
Start: 2021-10-06 | End: 2022-01-03

## 2021-10-16 ENCOUNTER — PATIENT MESSAGE (OUTPATIENT)
Dept: PRIMARY CARE CLINIC | Facility: CLINIC | Age: 51
End: 2021-10-16

## 2021-10-16 PROBLEM — E55.9 VITAMIN D DEFICIENCY: Status: ACTIVE | Noted: 2021-10-16

## 2021-10-16 PROBLEM — E87.6 HYPOKALEMIA: Status: ACTIVE | Noted: 2021-10-16

## 2021-10-16 RX ORDER — ERGOCALCIFEROL 1.25 MG/1
50000 CAPSULE ORAL
Qty: 12 CAPSULE | Refills: 0 | Status: SHIPPED | OUTPATIENT
Start: 2021-10-16 | End: 2022-01-02

## 2021-10-28 ENCOUNTER — OFFICE VISIT (OUTPATIENT)
Dept: PULMONOLOGY | Facility: CLINIC | Age: 51
End: 2021-10-28
Payer: COMMERCIAL

## 2021-10-28 VITALS
OXYGEN SATURATION: 98 % | BODY MASS INDEX: 45.83 KG/M2 | HEIGHT: 64 IN | DIASTOLIC BLOOD PRESSURE: 79 MMHG | HEART RATE: 65 BPM | SYSTOLIC BLOOD PRESSURE: 142 MMHG | WEIGHT: 268.44 LBS | TEMPERATURE: 97 F

## 2021-10-28 DIAGNOSIS — E66.01 CLASS 3 SEVERE OBESITY DUE TO EXCESS CALORIES WITH SERIOUS COMORBIDITY AND BODY MASS INDEX (BMI) OF 45.0 TO 49.9 IN ADULT: ICD-10-CM

## 2021-10-28 DIAGNOSIS — G47.33 OSA (OBSTRUCTIVE SLEEP APNEA): ICD-10-CM

## 2021-10-28 PROCEDURE — 3008F PR BODY MASS INDEX (BMI) DOCUMENTED: ICD-10-PCS | Mod: CPTII,S$GLB,, | Performed by: INTERNAL MEDICINE

## 2021-10-28 PROCEDURE — 1159F PR MEDICATION LIST DOCUMENTED IN MEDICAL RECORD: ICD-10-PCS | Mod: CPTII,S$GLB,, | Performed by: INTERNAL MEDICINE

## 2021-10-28 PROCEDURE — 3078F PR MOST RECENT DIASTOLIC BLOOD PRESSURE < 80 MM HG: ICD-10-PCS | Mod: CPTII,S$GLB,, | Performed by: INTERNAL MEDICINE

## 2021-10-28 PROCEDURE — 99999 PR PBB SHADOW E&M-EST. PATIENT-LVL IV: CPT | Mod: PBBFAC,,, | Performed by: INTERNAL MEDICINE

## 2021-10-28 PROCEDURE — 3078F DIAST BP <80 MM HG: CPT | Mod: CPTII,S$GLB,, | Performed by: INTERNAL MEDICINE

## 2021-10-28 PROCEDURE — 99213 PR OFFICE/OUTPT VISIT, EST, LEVL III, 20-29 MIN: ICD-10-PCS | Mod: S$GLB,,, | Performed by: INTERNAL MEDICINE

## 2021-10-28 PROCEDURE — 3077F SYST BP >= 140 MM HG: CPT | Mod: CPTII,S$GLB,, | Performed by: INTERNAL MEDICINE

## 2021-10-28 PROCEDURE — 3044F PR MOST RECENT HEMOGLOBIN A1C LEVEL <7.0%: ICD-10-PCS | Mod: CPTII,S$GLB,, | Performed by: INTERNAL MEDICINE

## 2021-10-28 PROCEDURE — 3008F BODY MASS INDEX DOCD: CPT | Mod: CPTII,S$GLB,, | Performed by: INTERNAL MEDICINE

## 2021-10-28 PROCEDURE — 3077F PR MOST RECENT SYSTOLIC BLOOD PRESSURE >= 140 MM HG: ICD-10-PCS | Mod: CPTII,S$GLB,, | Performed by: INTERNAL MEDICINE

## 2021-10-28 PROCEDURE — 99213 OFFICE O/P EST LOW 20 MIN: CPT | Mod: S$GLB,,, | Performed by: INTERNAL MEDICINE

## 2021-10-28 PROCEDURE — 99999 PR PBB SHADOW E&M-EST. PATIENT-LVL IV: ICD-10-PCS | Mod: PBBFAC,,, | Performed by: INTERNAL MEDICINE

## 2021-10-28 PROCEDURE — 1159F MED LIST DOCD IN RCRD: CPT | Mod: CPTII,S$GLB,, | Performed by: INTERNAL MEDICINE

## 2021-10-28 PROCEDURE — 3044F HG A1C LEVEL LT 7.0%: CPT | Mod: CPTII,S$GLB,, | Performed by: INTERNAL MEDICINE

## 2021-11-11 ENCOUNTER — HOSPITAL ENCOUNTER (EMERGENCY)
Facility: HOSPITAL | Age: 51
Discharge: HOME | End: 2021-11-12
Attending: EMERGENCY MEDICINE
Payer: COMMERCIAL

## 2021-11-11 DIAGNOSIS — M25.562 LEFT KNEE PAIN: Primary | ICD-10-CM

## 2021-11-11 PROCEDURE — 99283 EMERGENCY DEPT VISIT LOW MDM: CPT | Mod: 25

## 2021-11-11 PROCEDURE — 81025 URINE PREGNANCY TEST: CPT | Performed by: PHYSICIAN ASSISTANT

## 2021-11-12 ENCOUNTER — OFFICE VISIT (OUTPATIENT)
Dept: RHEUMATOLOGY | Facility: CLINIC | Age: 51
End: 2021-11-12
Payer: COMMERCIAL

## 2021-11-12 VITALS
TEMPERATURE: 98 F | OXYGEN SATURATION: 98 % | RESPIRATION RATE: 16 BRPM | SYSTOLIC BLOOD PRESSURE: 139 MMHG | DIASTOLIC BLOOD PRESSURE: 70 MMHG | BODY MASS INDEX: 41.2 KG/M2 | HEART RATE: 67 BPM | WEIGHT: 240 LBS

## 2021-11-12 VITALS
OXYGEN SATURATION: 98 % | BODY MASS INDEX: 47.54 KG/M2 | HEART RATE: 76 BPM | SYSTOLIC BLOOD PRESSURE: 138 MMHG | DIASTOLIC BLOOD PRESSURE: 82 MMHG | TEMPERATURE: 99 F | RESPIRATION RATE: 18 BRPM | WEIGHT: 278.44 LBS | HEIGHT: 64 IN

## 2021-11-12 DIAGNOSIS — E66.01 CLASS 3 SEVERE OBESITY WITH BODY MASS INDEX (BMI) OF 45.0 TO 49.9 IN ADULT, UNSPECIFIED OBESITY TYPE, UNSPECIFIED WHETHER SERIOUS COMORBIDITY PRESENT: ICD-10-CM

## 2021-11-12 DIAGNOSIS — Z71.89 COUNSELING AND COORDINATION OF CARE: ICD-10-CM

## 2021-11-12 DIAGNOSIS — Z79.1 NSAID LONG-TERM USE: ICD-10-CM

## 2021-11-12 DIAGNOSIS — M25.562 ACUTE PAIN OF LEFT KNEE: Primary | ICD-10-CM

## 2021-11-12 LAB
B-HCG UR QL: NEGATIVE
CTP QC/QA: YES

## 2021-11-12 PROCEDURE — 87205 SMEAR GRAM STAIN: CPT | Performed by: INTERNAL MEDICINE

## 2021-11-12 PROCEDURE — 99999 PR PBB SHADOW E&M-EST. PATIENT-LVL III: CPT | Mod: PBBFAC,,, | Performed by: INTERNAL MEDICINE

## 2021-11-12 PROCEDURE — 99999 PR PBB SHADOW E&M-EST. PATIENT-LVL III: ICD-10-PCS | Mod: PBBFAC,,, | Performed by: INTERNAL MEDICINE

## 2021-11-12 PROCEDURE — 3008F BODY MASS INDEX DOCD: CPT | Mod: CPTII,S$GLB,, | Performed by: INTERNAL MEDICINE

## 2021-11-12 PROCEDURE — 3079F PR MOST RECENT DIASTOLIC BLOOD PRESSURE 80-89 MM HG: ICD-10-PCS | Mod: CPTII,S$GLB,, | Performed by: INTERNAL MEDICINE

## 2021-11-12 PROCEDURE — 20610 DRAIN/INJ JOINT/BURSA W/O US: CPT | Mod: LT,S$GLB,, | Performed by: INTERNAL MEDICINE

## 2021-11-12 PROCEDURE — 3079F DIAST BP 80-89 MM HG: CPT | Mod: CPTII,S$GLB,, | Performed by: INTERNAL MEDICINE

## 2021-11-12 PROCEDURE — 3008F PR BODY MASS INDEX (BMI) DOCUMENTED: ICD-10-PCS | Mod: CPTII,S$GLB,, | Performed by: INTERNAL MEDICINE

## 2021-11-12 PROCEDURE — 99204 OFFICE O/P NEW MOD 45 MIN: CPT | Mod: 25,S$GLB,, | Performed by: INTERNAL MEDICINE

## 2021-11-12 PROCEDURE — 87070 CULTURE OTHR SPECIMN AEROBIC: CPT | Performed by: INTERNAL MEDICINE

## 2021-11-12 PROCEDURE — 3044F HG A1C LEVEL LT 7.0%: CPT | Mod: CPTII,S$GLB,, | Performed by: INTERNAL MEDICINE

## 2021-11-12 PROCEDURE — 3044F PR MOST RECENT HEMOGLOBIN A1C LEVEL <7.0%: ICD-10-PCS | Mod: CPTII,S$GLB,, | Performed by: INTERNAL MEDICINE

## 2021-11-12 PROCEDURE — 20610 LARGE JOINT ASPIRATION/INJECTION: L KNEE: ICD-10-PCS | Mod: LT,S$GLB,, | Performed by: INTERNAL MEDICINE

## 2021-11-12 PROCEDURE — 3075F PR MOST RECENT SYSTOLIC BLOOD PRESS GE 130-139MM HG: ICD-10-PCS | Mod: CPTII,S$GLB,, | Performed by: INTERNAL MEDICINE

## 2021-11-12 PROCEDURE — 3075F SYST BP GE 130 - 139MM HG: CPT | Mod: CPTII,S$GLB,, | Performed by: INTERNAL MEDICINE

## 2021-11-12 PROCEDURE — 1159F MED LIST DOCD IN RCRD: CPT | Mod: CPTII,S$GLB,, | Performed by: INTERNAL MEDICINE

## 2021-11-12 PROCEDURE — 99204 PR OFFICE/OUTPT VISIT, NEW, LEVL IV, 45-59 MIN: ICD-10-PCS | Mod: 25,S$GLB,, | Performed by: INTERNAL MEDICINE

## 2021-11-12 PROCEDURE — 1159F PR MEDICATION LIST DOCUMENTED IN MEDICAL RECORD: ICD-10-PCS | Mod: CPTII,S$GLB,, | Performed by: INTERNAL MEDICINE

## 2021-11-12 RX ORDER — TRIAMCINOLONE ACETONIDE 40 MG/ML
40 INJECTION, SUSPENSION INTRA-ARTICULAR; INTRAMUSCULAR
Status: DISCONTINUED | OUTPATIENT
Start: 2021-11-12 | End: 2021-11-12 | Stop reason: HOSPADM

## 2021-11-12 RX ORDER — LIDOCAINE HYDROCHLORIDE 10 MG/ML
2 INJECTION INFILTRATION; PERINEURAL
Status: DISCONTINUED | OUTPATIENT
Start: 2021-11-12 | End: 2021-11-12 | Stop reason: HOSPADM

## 2021-11-12 RX ORDER — IBUPROFEN 800 MG/1
800 TABLET ORAL 3 TIMES DAILY
Qty: 90 TABLET | Refills: 1 | Status: SHIPPED | OUTPATIENT
Start: 2021-11-12 | End: 2021-12-10 | Stop reason: SDUPTHER

## 2021-11-12 RX ADMIN — TRIAMCINOLONE ACETONIDE 40 MG: 40 INJECTION, SUSPENSION INTRA-ARTICULAR; INTRAMUSCULAR at 07:11

## 2021-11-12 RX ADMIN — LIDOCAINE HYDROCHLORIDE 2 ML: 10 INJECTION INFILTRATION; PERINEURAL at 07:11

## 2021-11-16 ENCOUNTER — TELEPHONE (OUTPATIENT)
Dept: RHEUMATOLOGY | Facility: CLINIC | Age: 51
End: 2021-11-16
Payer: COMMERCIAL

## 2021-11-16 LAB
BACTERIA FLD AEROBE CULT: NO GROWTH
GRAM STN SPEC: NORMAL
GRAM STN SPEC: NORMAL

## 2021-11-19 ENCOUNTER — PATIENT MESSAGE (OUTPATIENT)
Dept: RHEUMATOLOGY | Facility: CLINIC | Age: 51
End: 2021-11-19
Payer: COMMERCIAL

## 2021-11-19 RX ORDER — METHYLPREDNISOLONE 4 MG/1
TABLET ORAL
Qty: 1 EACH | Refills: 0 | Status: SHIPPED | OUTPATIENT
Start: 2021-11-19 | End: 2022-02-10

## 2021-12-02 ENCOUNTER — TELEPHONE (OUTPATIENT)
Dept: BARIATRICS | Facility: CLINIC | Age: 51
End: 2021-12-02
Payer: COMMERCIAL

## 2021-12-03 ENCOUNTER — TELEPHONE (OUTPATIENT)
Dept: BARIATRICS | Facility: CLINIC | Age: 51
End: 2021-12-03
Payer: COMMERCIAL

## 2021-12-10 ENCOUNTER — OFFICE VISIT (OUTPATIENT)
Dept: RHEUMATOLOGY | Facility: CLINIC | Age: 51
End: 2021-12-10
Payer: COMMERCIAL

## 2021-12-10 DIAGNOSIS — M25.562 ACUTE PAIN OF LEFT KNEE: ICD-10-CM

## 2021-12-10 PROCEDURE — 99214 OFFICE O/P EST MOD 30 MIN: CPT | Mod: 95,,, | Performed by: INTERNAL MEDICINE

## 2021-12-10 PROCEDURE — 99214 PR OFFICE/OUTPT VISIT, EST, LEVL IV, 30-39 MIN: ICD-10-PCS | Mod: 95,,, | Performed by: INTERNAL MEDICINE

## 2021-12-10 RX ORDER — IBUPROFEN 800 MG/1
800 TABLET ORAL 3 TIMES DAILY
Qty: 90 TABLET | Refills: 1 | Status: SHIPPED | OUTPATIENT
Start: 2021-12-10 | End: 2021-12-10

## 2021-12-16 ENCOUNTER — HOSPITAL ENCOUNTER (OUTPATIENT)
Dept: RADIOLOGY | Facility: HOSPITAL | Age: 51
Discharge: HOME | End: 2021-12-16
Attending: INTERNAL MEDICINE
Payer: COMMERCIAL

## 2021-12-16 DIAGNOSIS — M25.562 ACUTE PAIN OF LEFT KNEE: ICD-10-CM

## 2021-12-16 PROCEDURE — 73721 MRI JNT OF LWR EXTRE W/O DYE: CPT | Mod: TC,PO,LT

## 2021-12-16 PROCEDURE — 73721 MRI KNEE WITHOUT CONTRAST LEFT: ICD-10-PCS | Mod: 26,LT,, | Performed by: RADIOLOGY

## 2021-12-16 PROCEDURE — 73721 MRI JNT OF LWR EXTRE W/O DYE: CPT | Mod: 26,LT,, | Performed by: RADIOLOGY

## 2021-12-17 ENCOUNTER — PATIENT MESSAGE (OUTPATIENT)
Dept: RHEUMATOLOGY | Facility: CLINIC | Age: 51
End: 2021-12-17
Payer: COMMERCIAL

## 2021-12-17 DIAGNOSIS — M25.561 CHRONIC PAIN OF RIGHT KNEE: Primary | ICD-10-CM

## 2021-12-17 DIAGNOSIS — G89.29 CHRONIC PAIN OF LEFT KNEE: ICD-10-CM

## 2021-12-17 DIAGNOSIS — M25.562 CHRONIC PAIN OF LEFT KNEE: ICD-10-CM

## 2021-12-17 DIAGNOSIS — G89.29 CHRONIC PAIN OF RIGHT KNEE: Primary | ICD-10-CM

## 2021-12-20 ENCOUNTER — TELEPHONE (OUTPATIENT)
Dept: RHEUMATOLOGY | Facility: CLINIC | Age: 51
End: 2021-12-20
Payer: COMMERCIAL

## 2022-01-06 ENCOUNTER — TELEPHONE (OUTPATIENT)
Dept: BARIATRICS | Facility: CLINIC | Age: 52
End: 2022-01-06
Payer: COMMERCIAL

## 2022-01-21 ENCOUNTER — TELEPHONE (OUTPATIENT)
Dept: BARIATRICS | Facility: CLINIC | Age: 52
End: 2022-01-21
Payer: COMMERCIAL

## 2022-01-21 NOTE — TELEPHONE ENCOUNTER
----- Message from Erin Alex sent at 1/21/2022  9:11 AM CST -----  Regarding: Appointment Reschedule  Regarding:Pt called to r/s appt set for 2/2. Pt asked if next week was available latest appt is fine.         Call back number:[phone]

## 2022-01-25 ENCOUNTER — TELEPHONE (OUTPATIENT)
Dept: BARIATRICS | Facility: CLINIC | Age: 52
End: 2022-01-25
Payer: COMMERCIAL

## 2022-01-25 NOTE — TELEPHONE ENCOUNTER
----- Message from Jimena Harrison sent at 1/25/2022 12:16 PM CST -----  Contact: self @ [phone]  Pt says she is returning Ayse's call concerning rescheduling her appt.  Pls call.

## 2022-01-25 NOTE — TELEPHONE ENCOUNTER
----- Message from Paul Coronado sent at 1/25/2022 12:28 PM CST -----  Contact: [phone]  Pt needs to reschedule her appt from 2/2 to another date. Not able to reschedule. Pt would like a call back.    [phone]

## 2022-01-31 ENCOUNTER — PATIENT MESSAGE (OUTPATIENT)
Dept: PRIMARY CARE CLINIC | Facility: CLINIC | Age: 52
End: 2022-01-31
Payer: COMMERCIAL

## 2022-01-31 DIAGNOSIS — J06.9 VIRAL URI WITH COUGH: Primary | ICD-10-CM

## 2022-01-31 RX ORDER — ALBUTEROL SULFATE 90 UG/1
2 AEROSOL, METERED RESPIRATORY (INHALATION) EVERY 6 HOURS PRN
Qty: 18 G | Refills: 0 | Status: SHIPPED | OUTPATIENT
Start: 2022-01-31 | End: 2023-01-26

## 2022-02-01 ENCOUNTER — PATIENT MESSAGE (OUTPATIENT)
Dept: PRIMARY CARE CLINIC | Facility: CLINIC | Age: 52
End: 2022-02-01
Payer: COMMERCIAL

## 2022-02-10 ENCOUNTER — OFFICE VISIT (OUTPATIENT)
Dept: PRIMARY CARE CLINIC | Facility: CLINIC | Age: 52
End: 2022-02-10
Payer: COMMERCIAL

## 2022-02-10 VITALS
DIASTOLIC BLOOD PRESSURE: 54 MMHG | SYSTOLIC BLOOD PRESSURE: 111 MMHG | HEART RATE: 71 BPM | HEIGHT: 64 IN | TEMPERATURE: 98 F | RESPIRATION RATE: 18 BRPM | OXYGEN SATURATION: 97 % | WEIGHT: 271.19 LBS | BODY MASS INDEX: 46.3 KG/M2

## 2022-02-10 DIAGNOSIS — J06.9 URI WITH COUGH AND CONGESTION: Primary | ICD-10-CM

## 2022-02-10 DIAGNOSIS — F32.A ANXIETY AND DEPRESSION: Chronic | ICD-10-CM

## 2022-02-10 DIAGNOSIS — D64.9 NORMOCYTIC ANEMIA: ICD-10-CM

## 2022-02-10 DIAGNOSIS — G47.33 OSA (OBSTRUCTIVE SLEEP APNEA): Chronic | ICD-10-CM

## 2022-02-10 DIAGNOSIS — R73.03 PREDIABETES: Chronic | ICD-10-CM

## 2022-02-10 DIAGNOSIS — I10 ESSENTIAL HYPERTENSION: Chronic | ICD-10-CM

## 2022-02-10 DIAGNOSIS — H31.8 IDIOPATHIC POLYPOIDAL CHOROIDAL VASCULOPATHY: Chronic | ICD-10-CM

## 2022-02-10 DIAGNOSIS — F41.9 ANXIETY AND DEPRESSION: Chronic | ICD-10-CM

## 2022-02-10 DIAGNOSIS — Z23 FLU VACCINE NEED: ICD-10-CM

## 2022-02-10 DIAGNOSIS — J30.2 SEASONAL ALLERGIES: Chronic | ICD-10-CM

## 2022-02-10 DIAGNOSIS — E87.6 HYPOKALEMIA: Chronic | ICD-10-CM

## 2022-02-10 DIAGNOSIS — H35.3290 NEOVASCULAR AGE-RELATED MACULAR DEGENERATION: Chronic | ICD-10-CM

## 2022-02-10 DIAGNOSIS — Z12.11 SCREEN FOR COLON CANCER: ICD-10-CM

## 2022-02-10 DIAGNOSIS — E55.9 VITAMIN D DEFICIENCY: Chronic | ICD-10-CM

## 2022-02-10 DIAGNOSIS — E66.01 CLASS 3 SEVERE OBESITY DUE TO EXCESS CALORIES WITH SERIOUS COMORBIDITY AND BODY MASS INDEX (BMI) OF 45.0 TO 49.9 IN ADULT: Chronic | ICD-10-CM

## 2022-02-10 PROCEDURE — 90686 FLU VACCINE (QUAD) GREATER THAN OR EQUAL TO 3YO PRESERVATIVE FREE IM: ICD-10-PCS | Mod: S$GLB,,, | Performed by: FAMILY MEDICINE

## 2022-02-10 PROCEDURE — 1159F PR MEDICATION LIST DOCUMENTED IN MEDICAL RECORD: ICD-10-PCS | Mod: CPTII,S$GLB,, | Performed by: FAMILY MEDICINE

## 2022-02-10 PROCEDURE — 3074F PR MOST RECENT SYSTOLIC BLOOD PRESSURE < 130 MM HG: ICD-10-PCS | Mod: CPTII,S$GLB,, | Performed by: FAMILY MEDICINE

## 2022-02-10 PROCEDURE — 90471 IMMUNIZATION ADMIN: CPT | Mod: S$GLB,,, | Performed by: FAMILY MEDICINE

## 2022-02-10 PROCEDURE — 99999 PR PBB SHADOW E&M-EST. PATIENT-LVL IV: ICD-10-PCS | Mod: PBBFAC,,, | Performed by: FAMILY MEDICINE

## 2022-02-10 PROCEDURE — 3078F DIAST BP <80 MM HG: CPT | Mod: CPTII,S$GLB,, | Performed by: FAMILY MEDICINE

## 2022-02-10 PROCEDURE — 3008F BODY MASS INDEX DOCD: CPT | Mod: CPTII,S$GLB,, | Performed by: FAMILY MEDICINE

## 2022-02-10 PROCEDURE — 3078F PR MOST RECENT DIASTOLIC BLOOD PRESSURE < 80 MM HG: ICD-10-PCS | Mod: CPTII,S$GLB,, | Performed by: FAMILY MEDICINE

## 2022-02-10 PROCEDURE — 3074F SYST BP LT 130 MM HG: CPT | Mod: CPTII,S$GLB,, | Performed by: FAMILY MEDICINE

## 2022-02-10 PROCEDURE — 90686 IIV4 VACC NO PRSV 0.5 ML IM: CPT | Mod: S$GLB,,, | Performed by: FAMILY MEDICINE

## 2022-02-10 PROCEDURE — 1159F MED LIST DOCD IN RCRD: CPT | Mod: CPTII,S$GLB,, | Performed by: FAMILY MEDICINE

## 2022-02-10 PROCEDURE — 90471 FLU VACCINE (QUAD) GREATER THAN OR EQUAL TO 3YO PRESERVATIVE FREE IM: ICD-10-PCS | Mod: S$GLB,,, | Performed by: FAMILY MEDICINE

## 2022-02-10 PROCEDURE — 3008F PR BODY MASS INDEX (BMI) DOCUMENTED: ICD-10-PCS | Mod: CPTII,S$GLB,, | Performed by: FAMILY MEDICINE

## 2022-02-10 PROCEDURE — 99214 OFFICE O/P EST MOD 30 MIN: CPT | Mod: 25,S$GLB,, | Performed by: FAMILY MEDICINE

## 2022-02-10 PROCEDURE — 99214 PR OFFICE/OUTPT VISIT, EST, LEVL IV, 30-39 MIN: ICD-10-PCS | Mod: 25,S$GLB,, | Performed by: FAMILY MEDICINE

## 2022-02-10 PROCEDURE — 99999 PR PBB SHADOW E&M-EST. PATIENT-LVL IV: CPT | Mod: PBBFAC,,, | Performed by: FAMILY MEDICINE

## 2022-02-10 RX ORDER — AZITHROMYCIN 250 MG/1
TABLET, FILM COATED ORAL
COMMUNITY
Start: 2022-02-03 | End: 2022-02-10

## 2022-02-10 RX ORDER — PROMETHAZINE HYDROCHLORIDE AND DEXTROMETHORPHAN HYDROBROMIDE 6.25; 15 MG/5ML; MG/5ML
5 SYRUP ORAL EVERY 6 HOURS PRN
Qty: 180 ML | Refills: 0 | Status: SHIPPED | OUTPATIENT
Start: 2022-02-10 | End: 2022-02-20

## 2022-02-10 RX ORDER — BENZONATATE 100 MG/1
100 CAPSULE ORAL 3 TIMES DAILY PRN
Qty: 30 CAPSULE | Refills: 0 | Status: SHIPPED | OUTPATIENT
Start: 2022-02-10 | End: 2022-02-20

## 2022-02-10 RX ORDER — SERTRALINE HYDROCHLORIDE 50 MG/1
50 TABLET, FILM COATED ORAL DAILY
Qty: 90 TABLET | Refills: 3 | Status: SHIPPED | OUTPATIENT
Start: 2022-02-10 | End: 2023-01-26 | Stop reason: SDUPTHER
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
## 2022-02-22 ENCOUNTER — PATIENT OUTREACH (OUTPATIENT)
Dept: ADMINISTRATIVE | Facility: OTHER | Age: 52
End: 2022-02-22
Payer: COMMERCIAL

## 2022-02-22 NOTE — PROGRESS NOTES
[facility] immunization registry updated  Care Everywhere updated  Health Maintenance updated  Chart reviewed for overdue Proactive [facility] Encounters ([facility]) health maintenance testing (CRS, Breast Ca, Diabetic Eye Exam)   Orders entered:N/A  Patient has open case request for colonoscopy.  Fit Kit order not placed

## 2022-02-24 ENCOUNTER — OFFICE VISIT (OUTPATIENT)
Dept: BARIATRICS | Facility: CLINIC | Age: 52
End: 2022-02-24
Payer: COMMERCIAL

## 2022-02-24 VITALS
OXYGEN SATURATION: 98 % | SYSTOLIC BLOOD PRESSURE: 132 MMHG | HEIGHT: 65 IN | DIASTOLIC BLOOD PRESSURE: 78 MMHG | BODY MASS INDEX: 45.27 KG/M2 | HEART RATE: 86 BPM | WEIGHT: 271.69 LBS

## 2022-02-24 DIAGNOSIS — E66.01 CLASS 3 SEVERE OBESITY DUE TO EXCESS CALORIES WITH SERIOUS COMORBIDITY AND BODY MASS INDEX (BMI) OF 45.0 TO 49.9 IN ADULT: Primary | ICD-10-CM

## 2022-02-24 DIAGNOSIS — I10 ESSENTIAL HYPERTENSION: ICD-10-CM

## 2022-02-24 DIAGNOSIS — R73.03 PREDIABETES: ICD-10-CM

## 2022-02-24 DIAGNOSIS — Z71.3 ENCOUNTER FOR WEIGHT LOSS COUNSELING: ICD-10-CM

## 2022-02-24 DIAGNOSIS — G47.33 OSA (OBSTRUCTIVE SLEEP APNEA): ICD-10-CM

## 2022-02-24 PROCEDURE — 99214 OFFICE O/P EST MOD 30 MIN: CPT | Mod: S$GLB,,, | Performed by: STUDENT IN AN ORGANIZED HEALTH CARE EDUCATION/TRAINING PROGRAM

## 2022-02-24 PROCEDURE — 1160F PR REVIEW ALL MEDS BY PRESCRIBER/CLIN PHARMACIST DOCUMENTED: ICD-10-PCS | Mod: CPTII,S$GLB,, | Performed by: STUDENT IN AN ORGANIZED HEALTH CARE EDUCATION/TRAINING PROGRAM

## 2022-02-24 PROCEDURE — 3078F DIAST BP <80 MM HG: CPT | Mod: CPTII,S$GLB,, | Performed by: STUDENT IN AN ORGANIZED HEALTH CARE EDUCATION/TRAINING PROGRAM

## 2022-02-24 PROCEDURE — 1160F RVW MEDS BY RX/DR IN RCRD: CPT | Mod: CPTII,S$GLB,, | Performed by: STUDENT IN AN ORGANIZED HEALTH CARE EDUCATION/TRAINING PROGRAM

## 2022-02-24 PROCEDURE — 3008F PR BODY MASS INDEX (BMI) DOCUMENTED: ICD-10-PCS | Mod: CPTII,S$GLB,, | Performed by: STUDENT IN AN ORGANIZED HEALTH CARE EDUCATION/TRAINING PROGRAM

## 2022-02-24 PROCEDURE — 3008F BODY MASS INDEX DOCD: CPT | Mod: CPTII,S$GLB,, | Performed by: STUDENT IN AN ORGANIZED HEALTH CARE EDUCATION/TRAINING PROGRAM

## 2022-02-24 PROCEDURE — 1159F MED LIST DOCD IN RCRD: CPT | Mod: CPTII,S$GLB,, | Performed by: STUDENT IN AN ORGANIZED HEALTH CARE EDUCATION/TRAINING PROGRAM

## 2022-02-24 PROCEDURE — 99214 PR OFFICE/OUTPT VISIT, EST, LEVL IV, 30-39 MIN: ICD-10-PCS | Mod: S$GLB,,, | Performed by: STUDENT IN AN ORGANIZED HEALTH CARE EDUCATION/TRAINING PROGRAM

## 2022-02-24 PROCEDURE — 99999 PR PBB SHADOW E&M-EST. PATIENT-LVL IV: ICD-10-PCS | Mod: PBBFAC,,, | Performed by: STUDENT IN AN ORGANIZED HEALTH CARE EDUCATION/TRAINING PROGRAM

## 2022-02-24 PROCEDURE — 3075F SYST BP GE 130 - 139MM HG: CPT | Mod: CPTII,S$GLB,, | Performed by: STUDENT IN AN ORGANIZED HEALTH CARE EDUCATION/TRAINING PROGRAM

## 2022-02-24 PROCEDURE — 99999 PR PBB SHADOW E&M-EST. PATIENT-LVL IV: CPT | Mod: PBBFAC,,, | Performed by: STUDENT IN AN ORGANIZED HEALTH CARE EDUCATION/TRAINING PROGRAM

## 2022-02-24 PROCEDURE — 3075F PR MOST RECENT SYSTOLIC BLOOD PRESS GE 130-139MM HG: ICD-10-PCS | Mod: CPTII,S$GLB,, | Performed by: STUDENT IN AN ORGANIZED HEALTH CARE EDUCATION/TRAINING PROGRAM

## 2022-02-24 PROCEDURE — 1159F PR MEDICATION LIST DOCUMENTED IN MEDICAL RECORD: ICD-10-PCS | Mod: CPTII,S$GLB,, | Performed by: STUDENT IN AN ORGANIZED HEALTH CARE EDUCATION/TRAINING PROGRAM

## 2022-02-24 PROCEDURE — 3078F PR MOST RECENT DIASTOLIC BLOOD PRESSURE < 80 MM HG: ICD-10-PCS | Mod: CPTII,S$GLB,, | Performed by: STUDENT IN AN ORGANIZED HEALTH CARE EDUCATION/TRAINING PROGRAM

## 2022-02-24 RX ORDER — SEMAGLUTIDE 1.34 MG/ML
INJECTION, SOLUTION SUBCUTANEOUS
Qty: 1 PEN | Refills: 0 | Status: SHIPPED | OUTPATIENT
Start: 2022-02-24 | End: 2022-04-07 | Stop reason: SDUPTHER

## 2022-02-24 NOTE — PROGRESS NOTES
Subjective:       Patient ID: Criss Link is a 51 y.o. female.    Chief Complaint: Consult and Obesity    Patient presents for treatment of obesity.     Co-morbidities:   HTN  Prediabetes  CATE  Anxiety and Depression    Negative for thyroid cancer  Negative for pancreatitis    Weight History  Lowest adult weight: around 175 lbs  Highest adult weight: 271 lbs (current weight)     History of Weight Loss Efforts  No prior use of prescription medication for weight loss    Current Physical Activity  No current exercise routine  Sedentary job    Current Eating Habits  Breakfast - oatmeal, eggs, [employer], tea or coffee  Lunch - Healthy Choice meals, [employer] salad bar, [employer] chicken sandwich, nuggets  Dinner - sometimes skips, sometimes mom's cooking, taco bell  Snacks - chocolates, fruit  Beverages - [place] green tea, occasional soft drink, water    Alcohol: 2 Tumblers of [place] every 1-2 days, more on weekends    Medical Weight Loss  2/24/2022: 271.7 lbs, BMI 45.2, BFP 49.8%, .2 lbs, SMM 76.9 lbs, BMR 1707 kcal    Review of Systems   Constitutional: Negative for chills and fever.   Respiratory: Negative for shortness of breath.    Cardiovascular: Negative for chest pain and palpitations.   Gastrointestinal: Negative for abdominal pain, nausea and vomiting.   Musculoskeletal: Positive for arthralgias.   Neurological: Negative for dizziness and light-headedness.   Psychiatric/Behavioral: The patient is not nervous/anxious.          Objective:       Labs reviewed    Vitals:    02/24/22 1307   BP: 132/78   Pulse: 86       Physical Exam  Vitals reviewed.   Constitutional:       General: She is not in acute distress.     Appearance: Normal appearance. She is obese. She is not ill-appearing, toxic-appearing or diaphoretic.   HENT:      Head: Normocephalic and atraumatic.   Eyes:      Extraocular Movements: Extraocular movements intact.   Cardiovascular:      Rate and Rhythm: Normal rate.   Pulmonary:      Effort:  Pulmonary effort is normal. No respiratory distress.   Musculoskeletal:      Cervical back: Normal range of motion.   Skin:     General: Skin is warm and dry.   Neurological:      General: No focal deficit present.      Mental Status: She is alert and oriented to person, place, and time.      Gait: Gait normal.   Psychiatric:         Mood and Affect: Mood normal.         Behavior: Behavior normal.         Thought Content: Thought content normal.         Judgment: Judgment normal.         Assessment:       Problem List Items Addressed This Visit     Class 3 severe obesity due to excess calories with serious comorbidity and body mass index (BMI) of 45.0 to 49.9 in adult - Primary    Essential hypertension    Prediabetes    CATE (obstructive sleep apnea)      Other Visit Diagnoses     Encounter for weight loss counseling              Plan:   - Start Ozempic once a week. Start with 0.25mg once a week x 4 weeks, then 0.5 mg weekly.     - Log all food and beverage intake with a daily calorie goal of 3992-6159 calories per day    - Moderate intensity aerobic exercise for 15-30 minutes 3-5x/week    - Return to clinic in 4 weeks

## 2022-02-24 NOTE — PATIENT INSTRUCTIONS
Start Ozempic once a week. Start with 0.25mg once a week x 4 weeks, then 0.5 mg weekly.   Decrease portions as soon as you start Ozempic. Avoid fried, greasy, fatty foods.   Some nausea in the first 2 weeks is not unusual.   If you get pain across the upper abdomen and around to your back, please call the office.   [email or web address] for coupon/videos.           Weekly Weight:  Weigh yourself at least once a week to help keep track of your progress between visits. Tracking your weight will provide you with important feedback about the changes you are making. To measure your weight as accurately as possible, weigh yourself at the same time of day, wearing the same clothes, and using the same scale.       Food and Beverage Log:  Keep a log of all food and beverages that you consume.  Keeping track of calories will help you lose weight, because monitoring will help you become more aware of what you are eating and recognize your eating patterns.  Be mindful of your hunger level before eating and your satiety level after eating.  Just keeping records will help you make healthy changes in your diet and eat fewer calories.    Tips for keeping a calorie count:     Each day, aim for the daily calorie goal.     Record your food intake immediately after eating. If possible, look up calories before or immediately after eating.     Download an preet like [employer]ren[employer]com Fitness Pal, Lose It!, or Thinker Thing (Code: 12251) To keep track of your calories.    Measuring foods (using measuring cups or spoons) will help you be more accurate with counting calories.     Keep a running calorie count throughout the day.     Count daily calories toward a weekly calorie total. If you eat too many calories one day, cut back slightly over the next few days to meet your weekly goal.         Meal Planning & Grocery Shopping    Meal planning builds the foundation for healthy eating. When you have structured ideas for healthy meals and foods available at home  to prepare those meals, weight control becomes easier.  If only healthy foods are available at home, then you will be much more likely to eat healthy foods. And you will be less likely to go to a restaurant or  a fast food meal, which tend to be unhealthy and higher in calories than meals prepared at home.      Take 5-10 minutes each week to plan meals for the next 7 days.  Make a grocery list based on the meal plan.    Grocery Shopping Tips:  Shop on a full stomach.  Schedule your shopping for times when you are most motivated and able to be disciplined about your purchases. For example, after a stressful day at work it may be difficult to make the healthiest choices. Shopping at other times, such as early in the morning or after dinner, may be easier.  Focus your shopping on the outside aisles of the store, which tend to contain more fresh foods and lower calorie foods. The inside aisles tend to have more processed foods.  Stick to your list. Avoid buying unhealthy items just because they are on sale.   Compare nutrition labels to check the number of calories and percentage of fat.      What to buy:    Vegetables  Fresh vegetables  Frozen vegetables with no sauce or added salt  Canned vegetables with no sauce or added salt    Protein  Lean meats, such as chicken and turkey  Limit red meats, such as beef to no more than 1x/week  Limit processed meats, such as cold cuts, silveira, sausage, and hot dogs. Look for brands that have no nitrites and are minimally processed. Consider turkey sausage or turkey silveira.  Fish and Shellfish  Eggs  Dried beans  Canned beans (reduced sodium)    Fat  Use healthy oils, such as olive oil or canola oil, for cooking, salad dressings, etc.  Unflavored nuts and seeds  Nut butters (no added sugar)    Dairy  Yogurt (no sugar added)  Cheese  Low-fat milk  Unsweetened nondairy milks (almond milk, soy milk, etc)    Fruit  Fresh Fruit  Frozen fruit with no added sugar  Canned fruit with  no added sugar  Dried fruit with no added sugar  100% fruit juice    Whole Grains  Single ingredient grains, such as oats, quinoa, brown rice  Whole-wheat pasta  Sprouted whole-grain bread    What to avoid:  - Avoid fried foods  - Avoid foods with added sugar  - Avoid sugar-sweetened beverages  - Avoid ultra-processed foods            Copyright © 2011, [facility]. For more information about The Healthy Eating Plate, please see The Nutrition Source, Department of Nutrition, [employer], www.thenutritionsource.org, and [facility], [email or web address].      Sample Menu Plan:     DAY 1     Breakfast  1 cup 2% cottage cheese or Greek yogurt, 1/2 cup fruit      Snack  Low-carb protein drink (4 grams sugar or less)    Lunch  Tuna salad sandwich on whole wheat with lettuce and tomato, using light sheth    Snack  1oz unsalted nuts and 17 grapes (or a piece of fruit)    Dinner  3-4oz grilled fish   ½ mashed sweet potato with no calorie spray butter  1/2 cup cooked spinach, and1 cup salad with spray dressing      DAY 2    Breakfast  2 eggs with 1oz low-fat cheese, 1 slice whole wheat toast with spray margarine if desired    Snack  Low-carb protein drink (4 grams sugar or less)    Lunch  Turkey and low-fat cheese sandwich on whole wheat toast with lettuce and tomato    Snack  1 cup low-fat cottage cheese or Greek yogurt, ½ cup fruit    Dinner  Baked chicken thigh  ½ cup whole wheat pasta with olive oil and parmesan cheese  ½ cup cooked green beans, and1 cup salad with spray dressing      DAY 3    Breakfast   Low-carb protein drink (4 grams sugar or less)    Snack  Atkins protein bar    Lunch  Grilled Chicken sandwich on whole wheat, with lettuce, tomato and ¼ small avocado    Snack  1/2 cup fruit  1oz unsalted nuts    Dinner  1 cup red, white or black beans, ½ cup brown rice  ½ cup green beans or other cooked vegetable    Snack  Greek yogurt cup      DAY 4    Breakfast  1  tablespoon peanut butter and ½ banana on 1 slice whole wheat toast    Snack  Mozzarella string cheese  1/2 cup fruit    Lunch  1 cup whole wheat pasta, with 3oz chicken breast , and ½ cup steamed broccoli. Toss with 1 tbsp olive oil and ½ cup shredded parmesan cheese  1 cup salad with spray dressing    Dinner  1 cup broth based vegetable and noodle soup  Low-carb protein drink (4 grams sugar or less)    Sample meal plan  Protein drinks and bars: 0-4 grams sugar  Drink lots of water throughout the day and exercise!      MENU # 1  Breakfast: 2 eggs, 1 turkey sausage blanca  Lunch: 2-3 roll-ups (sliced turkey, low-fat slice cheese), baby carrots dipped in 1 Tbsp natural peanut butter  Mid-Day Snack: ¼ cup unsalted almonds, ½ cup fruit  Supper: 1 chicken thigh simmered in tomato sauce + 2 Tbsp mozzarella cheese, ½ cup black beans, 1/2 cup steamed veggies  Evening Snack: 1/2 cup grapes with 4 cubes low-fat cheddar cheese     MENU # 2  Breakfast: protein drink  Mid-morning snack : ¼ cup unsalted nuts  Lunch: 1 cup tuna or chicken salad made with light shteh, over salad.   Supper: hamburger blanca, slice of cheese, 1 cup steamed veggies.   Snack: Greek yogurt    Menu #3  Breakfast: 6oz plain Greek yogurt + fruit (½ banana, ½ cup fruit - pineapple, blueberries, strawberries, peach)  Lunch: ½  chicken breast w/ slice pepper venessa cheese, 1/2 cup steamed veggies and small salad with Salad Spritzer.    Mid-Day snack: protein drink   Supper: 4oz Grilled fish, grilled veggie kabob ( mushrooms, onion, bell pepper, yellow squash, zucchini, cherry tomatoes)    Menu # 4  Breakfast: vanilla iced coffee: 1 scoop vanilla protein powder + 4oz skim milk + 4oz coffee   Snack: protein bar  Lunch: 2 Lettuce tacos: ¼ cup seasoned ground turkey wrapped in a Hal lettuce leaf with 1 Tbsp shredded cheese and dollop low-fat sour cream  Dinner: Shrimp omelet: 2 eggs, ½ cup shrimp, green onions, and shredded cheese    Menu #5  Breakfast: 1 cup  low-fat cottage cheese, ½ cup fruit  Lunch: 2 oz baked pork chop, 1 cup okra and tomato stew  Snack: 1 cup black beans + salsa + dollop sour cream  Dinner: Caprese chicken salad: 2 oz chicken, 1oz fresh mozzarella, sliced tomato, 1 Tbsp olive oil, basil    Menu #6  Breakfast: ½ cup part-skim ricotta cheese, 1/2 cup fruit, ¼ cup slivered almonds  Lunch: 2 oz canned chicken, 1oz shredded cheddar cheese, ½ cup black beans, 2 Tbsp salsa  Snack: Protein drink  Dinner: 4 oz grilled salmon steak, over mixed green salad with light dressing      Snacks: (100-200 calories; >5g protein)     - 1 low-fat cheese stick with 8 cherry tomatoes or 1 serving fresh fruit  - 4 thin slices fat-free turkey breast and 1 slice low-fat cheese  - 4 thin slices fat-free honey ham with wedge of melon  - 2 slices of turkey silveira  - Boiled eggs (can buy at [employer] already boiled w/ shell removed)  - 6-8 edamame pods (equivalent to about 1/4 cup edamame without pods).   - 1/4 cup unsalted nuts with ½ cup fruit  - 6-oz container Dannon Light n Fit vanilla yogurt, topped with 1oz unsalted nuts         - apple, celery or baby carrots spread with 2 Tbsp PB2  - apple slices with 1 oz slice low-fat cheese  - Apple slices dipped in 2 Tbsp of PB2  - 2 Tbsp PB2 mixed in light or greek yogurt or sugar-free pudding  - celery, cucumber, bell pepper or baby carrots dipped in ¼ cup hummus bean spread   - celery, cucumber, baby carrots dipped in high protein greek yogurt (Mix 16 oz plain greek yogurt + 1 packet of hidden [place] ranch dip mix)  - Glenn Links Beef Steak - 14g protein! (similar to beef jerky but very lean)  - 2 wedges L[place] Cow - Light Herb & Garlic Cheese with sliced cucumber or green bell pepper  - 1/2 cup low-fat cottage cheese with ¼ cup fruit or ¼ cup salsa  - 1/2 cup low fat cottage cheese with 10-15 cherry tomatoes  - 8 oz glass of FAIRLIFE fat-free or low-fat milk (13 g protein)      ** Be CREATIVE. You can always snack on bites of  grilled chicken or tuna salad made with low fat sheth, if needed!       SEATED RESISTANCE BAND EXERCISES    If you do not have a resistance band, or do not feel comfortable using a resistance band, these exercises can also be done holding a light hand weight or water bottle.  If you are just starting to exercise, you may want to go through the motions without any weights or resistance till you become comfortable with the movements.    Do each of the movements shown 10 times (10 repetitions). You can repeat the exercises a second or  third time as well for greater benefit. The amount of tension on the resistance bands should be adjusted so  you can complete one set of 10 repetitions with effort. Increase the tension every few weeks. Do these  exercises 2 or 3 times a week.    * If an exercise hurts your back or joints, stop doing that particular exercise, but keep doing all the others *      CHEST EXERCISE        Start Position:   Sit tall, with feet shoulder width apart and feet in front of knees.   Belly pulled in.   Place the band around your upper back, grasp band in each hand, knuckles (rings) facing front. Arms  should be bent so that knuckles are in front of elbows   Slide shoulder blades down your back and slightly together (as if making a V).   Relax your neck.    To Perform This Exercise:   Press hands forward to lengthen arms using chest muscles (not arms!).   Dont arch your back!)   Return to start position and repeat 10 times.   Breathe!        BACK EXERCISE        Start Position:   Sit tall, with feet shoulder width apart and feet in front of knees.   Belly pulled in.   Grasp band in each hand and raise overhead. Arms should be slightly wider than shoulder width and no  slack in the band.   Slide shoulder blades down your back and slightly together (as if making a V).   Relax your neck.    To Perform This Exercise:   Open arms pulling down towards chest using upper back muscles (not arms!).    Squeeze through shoulder blades at the bottom of the movement (dont arch your back!).   Return to start position and repeat 10 times.   Breathe!        SHOULDER EXERCISE        Start Position:   Sit tall, with feet shoulder width apart and feet in front of knees.   Belly pulled in.   Sit on band so that you can grasp band in one hand with tension on the band, but not so much tension that  you cannot straighten the arm. It may take a few tries to find the right amount of tension.   Slide shoulder blades down your back and slightly together (as if making a V).   Relax your neck.    To Perform This Exercise:   Press fist to the ceiling, slightly in front of the body.   SLOWLY return to start position and repeat 10 times.   Switch sides and repeat on the other side.   Breathe!        TRICEPS EXERCISE        Start Position:   Sit tall, with feet shoulder width apart and feet in front of knees.   Belly pulled in.   Sit on one end of the band. Grasp other end of band in one hand.   Point elbow directly toward the ceiling (if this is difficult, you may support the upper arm with the opposite  hand)   Be sure there is no slack in the band in the starting position.   Slide shoulder blades down your back and slightly together (as if making a V).   Relax your neck.    To Perform This Exercise:   Extend your arm up to the ceiling, as shown.   Squeeze through shoulder blades at the bottom of the movement (dont arch your back!).   SLOWLY return to start position and repeat 10 times.   Repeat on the other side.   Breathe!        BICEP EXERCISE        Start Position:   Sit tall, with feet shoulder width apart and feet in front of knees.   Belly pulled in.   Place center of exercise band under one foot and step the end of the band under the other foot.   Grasp each end of the band with one hand. Make sure there is no slack between your foot and the hand  that holds the band.   Hold your elbows to your sides.   Pull abdominals  in, lift chest, press shoulders down and back.    To Perform This Exercise:   As you curl up, keep your wrist from changing position in relation to your forearm and your arm stable  from the shoulder to the elbow.   Bend and straighten your elbow in a slow and controlled movement. Repeat this motion 10 times.   Repeat on the other side.   Breathe!    Lifestyle Activity    Lifestyle activity consists of all the activities that burn calories during the course of a normal day. Using the stairs, washing the dishes, or even getting up to turn off the television are all examples of lifestyle activity. All activities, no matter how small, burn calories. Increasing lifestyle activity can help with weight control, so building physical activity into your everyday routine is important.     A pedometer is a tool that can help you track how much lifestyle activity you are getting. It can help you stay active. A pedometer counts each step you take and displays your total steps on the screen. Many smartphones, including iPhones and Androids, have applications that automatically count your steps. If you decide to use this method, make sure you are holding or wearing your smartphone so it can count all of your steps.     During the next 2 weeks, aim for 5,000 or more steps per day. Each week aim to increase your daily step goal by 250 so that you will reach an average of 10,000 steps per day (equal to walking 4 to 5 miles).     Start making more active choices in your routine in order to increase the amount of walking you do each day.     Strategies to Increase Lifestyle Activity:    Take several 5-10-minute walks during the day.   Set a reminder to take a 5 minute break from your desk every hour.   Choose the farthest entrance to a building that you are entering.   Host walking meetings at work.   Walk down the cooper to contact co-workers face-to-face, instead of emailing or calling.  Walk to a restroom, water cooler, or copy  machine on a different floor at work.   Walk during your lunch break.   Park farther away in parking lots.   Get off the bus or train earlier and walk farther to your destination.   Take the stairs rather than the elevator or the escalator.   Start a walking club with co-workers or neighbors.   Walk - don't drive - for trips less than one mile.   Take an after-dinner walk with family.   Go for a walk while talking on your wireless phone.   Walk the dog more often.   If you prefer to stay indoors because of the weather, try walking in a shopping mall or doing laps around a large store.   Purchase a treadmill to use at home.   Schedule time for walking every week and stick to it like any other appointment.   Or think of your own strategy: _______________________________     Physical Activity    Physical activity improves your health and helps with weight control, especially weight loss maintenance.      When starting to incorporate an exercise routine into your day, it is helpful to set specific goals.  For example, I will walk at moderate intensity from 12:30-12:45pm on Monday, Wednesday, and Friday.  Schedule your exercise time into your calendar.  Think of any potential barriers that may come up and prevent you from exercising.  Develop solutions or back-up plans for when these barriers may arise.    Walking is an ideal activity to start with if you do not currently have an exercise routine. You can make the activity more fun by doing it with a friend or listening to music or a book on tape while you do it. If you don't enjoy walking, think of other activities you like, such as bike riding or swimming.  Other alternatives include group fitness classes or exercise at home using DVDs, Apps, etc.    If you are new to exercising, then start with 10 minutes 3-4 days per week.  After two weeks, increase to 15 minutes 3-4 days per week.  If you already exercise more than this, continue at your higher level, or increase  by 10-15 minutes if able.         Aerobic Activity  Aerobic Activity gets you breathing harder and your heart beating faster.  Eventually, you should work up to 150 - 300 minutes of moderate-intensity aerobic activity every week or 75 - 150 minutes of vigorous-intensity aerobic activity every week.  An easy way to gauge the intensity of your activity is with the Talk Test.  During moderate activity, you should be able to talk, but not sing without having to pause for a breath.  During vigorous activity, you shouldn't be able to say more than a few words without pausing for a breath.  You should not push yourself until you are completely out of breath, tired, or sore.    Examples of Aerobic Activity:  Walking  Running  Swimming  Biking  Playing sports like tennis or basketball  Dancing  Jumping Rope      Strength Training  It is also recommended that you perform muscle-strengthening activities at least 2 days per week.  Be sure to include activities that work all major muscle groups (legs, arms, abdomen, back, buttocks, chest, and shoulders)    Examples of Strength Training  Lifting weights  Working with resistance band  Using your body weight for resistance (squats, push-ups, sit-ups)  Pilates  Yoga      https://health.gov/moveyourway/activity-planner/      Remember to keep a record of your activity to track your progress.

## 2022-03-14 ENCOUNTER — PATIENT MESSAGE (OUTPATIENT)
Dept: BARIATRICS | Facility: CLINIC | Age: 52
End: 2022-03-14
Payer: COMMERCIAL

## 2022-04-06 ENCOUNTER — PATIENT OUTREACH (OUTPATIENT)
Dept: ADMINISTRATIVE | Facility: OTHER | Age: 52
End: 2022-04-06
Payer: COMMERCIAL

## 2022-04-07 ENCOUNTER — OFFICE VISIT (OUTPATIENT)
Dept: BARIATRICS | Facility: CLINIC | Age: 52
End: 2022-04-07
Payer: COMMERCIAL

## 2022-04-07 VITALS
SYSTOLIC BLOOD PRESSURE: 132 MMHG | DIASTOLIC BLOOD PRESSURE: 80 MMHG | HEART RATE: 74 BPM | BODY MASS INDEX: 44.5 KG/M2 | WEIGHT: 267.38 LBS | OXYGEN SATURATION: 98 %

## 2022-04-07 DIAGNOSIS — E66.01 CLASS 3 SEVERE OBESITY DUE TO EXCESS CALORIES WITH SERIOUS COMORBIDITY AND BODY MASS INDEX (BMI) OF 40.0 TO 44.9 IN ADULT: Primary | ICD-10-CM

## 2022-04-07 DIAGNOSIS — I10 ESSENTIAL HYPERTENSION: ICD-10-CM

## 2022-04-07 DIAGNOSIS — Z71.3 ENCOUNTER FOR WEIGHT LOSS COUNSELING: ICD-10-CM

## 2022-04-07 DIAGNOSIS — R73.03 PREDIABETES: ICD-10-CM

## 2022-04-07 PROCEDURE — 1159F PR MEDICATION LIST DOCUMENTED IN MEDICAL RECORD: ICD-10-PCS | Mod: CPTII,S$GLB,, | Performed by: STUDENT IN AN ORGANIZED HEALTH CARE EDUCATION/TRAINING PROGRAM

## 2022-04-07 PROCEDURE — 1160F RVW MEDS BY RX/DR IN RCRD: CPT | Mod: CPTII,S$GLB,, | Performed by: STUDENT IN AN ORGANIZED HEALTH CARE EDUCATION/TRAINING PROGRAM

## 2022-04-07 PROCEDURE — 3079F PR MOST RECENT DIASTOLIC BLOOD PRESSURE 80-89 MM HG: ICD-10-PCS | Mod: CPTII,S$GLB,, | Performed by: STUDENT IN AN ORGANIZED HEALTH CARE EDUCATION/TRAINING PROGRAM

## 2022-04-07 PROCEDURE — 3008F PR BODY MASS INDEX (BMI) DOCUMENTED: ICD-10-PCS | Mod: CPTII,S$GLB,, | Performed by: STUDENT IN AN ORGANIZED HEALTH CARE EDUCATION/TRAINING PROGRAM

## 2022-04-07 PROCEDURE — 3008F BODY MASS INDEX DOCD: CPT | Mod: CPTII,S$GLB,, | Performed by: STUDENT IN AN ORGANIZED HEALTH CARE EDUCATION/TRAINING PROGRAM

## 2022-04-07 PROCEDURE — 99999 PR PBB SHADOW E&M-EST. PATIENT-LVL III: ICD-10-PCS | Mod: PBBFAC,,, | Performed by: STUDENT IN AN ORGANIZED HEALTH CARE EDUCATION/TRAINING PROGRAM

## 2022-04-07 PROCEDURE — 99999 PR PBB SHADOW E&M-EST. PATIENT-LVL III: CPT | Mod: PBBFAC,,, | Performed by: STUDENT IN AN ORGANIZED HEALTH CARE EDUCATION/TRAINING PROGRAM

## 2022-04-07 PROCEDURE — 99213 OFFICE O/P EST LOW 20 MIN: CPT | Mod: S$GLB,,, | Performed by: STUDENT IN AN ORGANIZED HEALTH CARE EDUCATION/TRAINING PROGRAM

## 2022-04-07 PROCEDURE — 3075F SYST BP GE 130 - 139MM HG: CPT | Mod: CPTII,S$GLB,, | Performed by: STUDENT IN AN ORGANIZED HEALTH CARE EDUCATION/TRAINING PROGRAM

## 2022-04-07 PROCEDURE — 99213 PR OFFICE/OUTPT VISIT, EST, LEVL III, 20-29 MIN: ICD-10-PCS | Mod: S$GLB,,, | Performed by: STUDENT IN AN ORGANIZED HEALTH CARE EDUCATION/TRAINING PROGRAM

## 2022-04-07 PROCEDURE — 3079F DIAST BP 80-89 MM HG: CPT | Mod: CPTII,S$GLB,, | Performed by: STUDENT IN AN ORGANIZED HEALTH CARE EDUCATION/TRAINING PROGRAM

## 2022-04-07 PROCEDURE — 3075F PR MOST RECENT SYSTOLIC BLOOD PRESS GE 130-139MM HG: ICD-10-PCS | Mod: CPTII,S$GLB,, | Performed by: STUDENT IN AN ORGANIZED HEALTH CARE EDUCATION/TRAINING PROGRAM

## 2022-04-07 PROCEDURE — 1159F MED LIST DOCD IN RCRD: CPT | Mod: CPTII,S$GLB,, | Performed by: STUDENT IN AN ORGANIZED HEALTH CARE EDUCATION/TRAINING PROGRAM

## 2022-04-07 PROCEDURE — 1160F PR REVIEW ALL MEDS BY PRESCRIBER/CLIN PHARMACIST DOCUMENTED: ICD-10-PCS | Mod: CPTII,S$GLB,, | Performed by: STUDENT IN AN ORGANIZED HEALTH CARE EDUCATION/TRAINING PROGRAM

## 2022-04-07 RX ORDER — SEMAGLUTIDE 1.34 MG/ML
0.5 INJECTION, SOLUTION SUBCUTANEOUS
Qty: 1 PEN | Refills: 2 | Status: SHIPPED | OUTPATIENT
Start: 2022-04-07 | End: 2022-05-12

## 2022-04-07 NOTE — PROGRESS NOTES
Subjective:       Patient ID: Criss Link is a 51 y.o. female.    Chief Complaint: Follow-up, Obesity, and Weight Check    Patient presents for treatment of obesity.   Follow-up, appointment #2    Co-morbidities:   HTN  Prediabetes  CATE  Anxiety and Depression    Negative for thyroid cancer  Negative for pancreatitis    Weight History  Lowest adult weight: around 175 lbs  Highest adult weight: 271 lbs (current weight)     History of Weight Loss Efforts  No prior use of prescription medication for weight loss    Current Physical Activity  No current exercise routine  Sedentary job    Current Eating Habits  Breakfast - oatmeal, eggs, [employer], tea or coffee  Lunch - Healthy Choice meals, [employer] salad bar, [employer] chicken sandwich, nuggets  Dinner - sometimes skips, sometimes mom's cooking, taco bell  Snacks - chocolates, fruit  Beverages - [place] green tea, occasional soft drink, water  Alcohol -  2 Tumblers of [place] every 1-2 days, more on weekends    Struggling with meal prep  Salad bar from [employer]  Lean Body protein shake    Medical Weight Loss  2/24/2022: 271.7 lbs, BMI 45.2, BFP 49.8%, .2 lbs, SMM 76.9 lbs, BMR 1707 kcal; Ozempic  4/7/2022: 267.4 lbs, BMI 44.5, BFP 49.3%, .8 lbs, SMM 77.2 lbs, BMR 1698 kcal; Ozempic    Review of Systems   Constitutional: Negative for chills and fever.   Respiratory: Negative for shortness of breath.    Cardiovascular: Negative for chest pain and palpitations.   Gastrointestinal: Negative for abdominal pain, nausea and vomiting.   Musculoskeletal: Positive for arthralgias.   Neurological: Negative for dizziness and light-headedness.   Psychiatric/Behavioral: The patient is not nervous/anxious.          Objective:        Latest Reference Range & Units 10/06/21 13:51 11/12/21 09:45   WBC 3.90 - 12.70 K/uL 10.09 8.50   RBC 4.00 - 5.40 M/uL 4.13 4.04   Hemoglobin 12.0 - 16.0 g/dL 10.1 (L) 9.9 (L)   Hematocrit 37.0 - 48.5 % 33.3 (L) 32.6 (L)   MCV 82 - 98 fL 81 (L) 81  (L)   MCH 27.0 - 31.0 pg 24.5 (L) 24.5 (L)   MCHC 32.0 - 36.0 g/dL 30.3 (L) 30.4 (L)   RDW 11.5 - 14.5 % 17.0 (H) 16.3 (H)   Platelets 150 - 450 K/uL 480 (H) 459 (H)   MPV 9.2 - 12.9 fL 10.8 9.4   Gran % 38.0 - 73.0 % 68.7 77.5 (H)   Lymph % 18.0 - 48.0 % 23.9 17.1 (L)   Mono % 4.0 - 15.0 % 6.4 4.2   Eosinophil % 0.0 - 8.0 % 0.3 0.6   Basophil % 0.0 - 1.9 % 0.4 0.2   Immature Granulocytes 0.0 - 0.5 % 0.3 0.4   Gran # (ANC) 1.8 - 7.7 K/uL 6.9 6.6   Lymph # 1.0 - 4.8 K/uL 2.4 1.5   Mono # 0.3 - 1.0 K/uL 0.7 0.4   Eos # 0.0 - 0.5 K/uL 0.0 0.1   Baso # 0.00 - 0.20 K/uL 0.04 0.02   Immature Grans (Abs) 0.00 - 0.04 K/uL 0.03 [1] 0.03 [2]   NRBC 0 /100 WBC 0 0   Differential Method  Automated Automated   Iron 30 - 160 ug/dL 32    TIBC 250 - 450 ug/dL 465 (H)    Saturated Iron 20 - 50 % 7 (L)    Transferrin 200 - 375 mg/dL 314    Ferritin 20.0 - 300.0 ng/mL 24    Sed Rate 0 - 20 mm/Hr  15   Sodium 136 - 145 mmol/L 138 140   Potassium 3.5 - 5.1 mmol/L 3.6 4.0   Chloride 95 - 110 mmol/L 103 103   CO2 23 - 29 mmol/L 20 (L) 30 (H)   Anion Gap 8 - 16 mmol/L 15 7 (L)   BUN 6 - 20 mg/dL 9 11   Creatinine 0.5 - 1.4 mg/dL 0.8 0.8   EGFR if non African American >60 mL/min/1.73 m^2 >60.0 [3] >60 [4]   EGFR if African American >60 mL/min/1.73 m^2 >60.0 >60   Glucose 70 - 110 mg/dL 83 104   Calcium 8.7 - 10.5 mg/dL 9.7 10.1   Alkaline Phosphatase 55 - 135 U/L 70 67   PROTEIN TOTAL 6.0 - 8.4 g/dL 7.7 7.0   Albumin 3.5 - 5.2 g/dL 3.9 3.7   Uric Acid 2.4 - 5.7 mg/dL  5.2   BILIRUBIN TOTAL 0.1 - 1.0 mg/dL 0.6 [5] 0.3 [6]   AST 10 - 40 U/L 23 18   ALT 10 - 44 U/L 15 18   CRP 0.0 - 8.2 mg/L  15.2 (H)   Triglycerides 30 - 150 mg/dL 55 [7]    Cholesterol 120 - 199 mg/dL 200 (H) [8]    HDL 40 - 75 mg/dL 88 (H) [9]    HDL/Cholesterol Ratio 20.0 - 50.0 % 44.0    LDL Cholesterol External 63.0 - 159.0 mg/dL 101.0 [10]    Non-HDL Cholesterol mg/dL 112 [11]    Total Cholesterol/HDL Ratio 2.0 - 5.0  2.3    Vit D, 25-Hydroxy 30 - 96 ng/mL 17 (L) [12]     Hemoglobin A1C External 4.0 - 5.6 % 5.9 (H) [13]    Estimated Avg Glucose 68 - 131 mg/dL 123          Vitals:    04/07/22 1549   BP: 132/80   Pulse: 74       Physical Exam  Vitals reviewed.   Constitutional:       General: She is not in acute distress.     Appearance: Normal appearance. She is obese. She is not ill-appearing, toxic-appearing or diaphoretic.   HENT:      Head: Normocephalic and atraumatic.   Eyes:      Extraocular Movements: Extraocular movements intact.   Cardiovascular:      Rate and Rhythm: Normal rate.   Pulmonary:      Effort: Pulmonary effort is normal. No respiratory distress.   Musculoskeletal:      Cervical back: Normal range of motion.   Skin:     General: Skin is warm and dry.   Neurological:      General: No focal deficit present.      Mental Status: She is alert and oriented to person, place, and time.      Gait: Gait normal.   Psychiatric:         Mood and Affect: Mood normal.         Behavior: Behavior normal.         Thought Content: Thought content normal.         Judgment: Judgment normal.         Assessment:       Problem List Items Addressed This Visit     Class 3 severe obesity due to excess calories with serious comorbidity and body mass index (BMI) of 40.0 to 44.9 in adult - Primary    Essential hypertension    Prediabetes      Other Visit Diagnoses     Encounter for weight loss counseling              Plan:   - Ozempic 0.5 mg weekly.     - Log all food and beverage intake with a daily calorie goal of 9862-3377 calories per day    - Moderate intensity aerobic exercise for 15-30 minutes 3-5x/week    - Return to clinic in 4 weeks

## 2022-05-09 ENCOUNTER — PATIENT OUTREACH (OUTPATIENT)
Dept: ADMINISTRATIVE | Facility: OTHER | Age: 52
End: 2022-05-09
Payer: COMMERCIAL

## 2022-05-09 ENCOUNTER — OFFICE VISIT (OUTPATIENT)
Dept: OBSTETRICS AND GYNECOLOGY | Facility: CLINIC | Age: 52
End: 2022-05-09
Payer: COMMERCIAL

## 2022-05-09 VITALS
BODY MASS INDEX: 44.44 KG/M2 | HEIGHT: 65 IN | DIASTOLIC BLOOD PRESSURE: 70 MMHG | SYSTOLIC BLOOD PRESSURE: 122 MMHG | WEIGHT: 266.75 LBS

## 2022-05-09 DIAGNOSIS — Z12.31 SCREENING MAMMOGRAM, ENCOUNTER FOR: ICD-10-CM

## 2022-05-09 DIAGNOSIS — Z01.419 WELL WOMAN EXAM WITH ROUTINE GYNECOLOGICAL EXAM: Primary | ICD-10-CM

## 2022-05-09 DIAGNOSIS — Z12.11 COLON CANCER SCREENING: ICD-10-CM

## 2022-05-09 PROCEDURE — 3078F DIAST BP <80 MM HG: CPT | Mod: CPTII,S$GLB,, | Performed by: OBSTETRICS & GYNECOLOGY

## 2022-05-09 PROCEDURE — 99999 PR PBB SHADOW E&M-EST. PATIENT-LVL III: CPT | Mod: PBBFAC,,, | Performed by: OBSTETRICS & GYNECOLOGY

## 2022-05-09 PROCEDURE — 99999 PR PBB SHADOW E&M-EST. PATIENT-LVL III: ICD-10-PCS | Mod: PBBFAC,,, | Performed by: OBSTETRICS & GYNECOLOGY

## 2022-05-09 PROCEDURE — 3008F PR BODY MASS INDEX (BMI) DOCUMENTED: ICD-10-PCS | Mod: CPTII,S$GLB,, | Performed by: OBSTETRICS & GYNECOLOGY

## 2022-05-09 PROCEDURE — 99396 PR PREVENTIVE VISIT,EST,40-64: ICD-10-PCS | Mod: S$GLB,,, | Performed by: OBSTETRICS & GYNECOLOGY

## 2022-05-09 PROCEDURE — 1159F MED LIST DOCD IN RCRD: CPT | Mod: CPTII,S$GLB,, | Performed by: OBSTETRICS & GYNECOLOGY

## 2022-05-09 PROCEDURE — 1160F PR REVIEW ALL MEDS BY PRESCRIBER/CLIN PHARMACIST DOCUMENTED: ICD-10-PCS | Mod: CPTII,S$GLB,, | Performed by: OBSTETRICS & GYNECOLOGY

## 2022-05-09 PROCEDURE — 87481 CANDIDA DNA AMP PROBE: CPT | Mod: 59 | Performed by: OBSTETRICS & GYNECOLOGY

## 2022-05-09 PROCEDURE — 3074F SYST BP LT 130 MM HG: CPT | Mod: CPTII,S$GLB,, | Performed by: OBSTETRICS & GYNECOLOGY

## 2022-05-09 PROCEDURE — 99396 PREV VISIT EST AGE 40-64: CPT | Mod: S$GLB,,, | Performed by: OBSTETRICS & GYNECOLOGY

## 2022-05-09 PROCEDURE — 3078F PR MOST RECENT DIASTOLIC BLOOD PRESSURE < 80 MM HG: ICD-10-PCS | Mod: CPTII,S$GLB,, | Performed by: OBSTETRICS & GYNECOLOGY

## 2022-05-09 PROCEDURE — 3008F BODY MASS INDEX DOCD: CPT | Mod: CPTII,S$GLB,, | Performed by: OBSTETRICS & GYNECOLOGY

## 2022-05-09 PROCEDURE — 1159F PR MEDICATION LIST DOCUMENTED IN MEDICAL RECORD: ICD-10-PCS | Mod: CPTII,S$GLB,, | Performed by: OBSTETRICS & GYNECOLOGY

## 2022-05-09 PROCEDURE — 87801 DETECT AGNT MULT DNA AMPLI: CPT | Performed by: OBSTETRICS & GYNECOLOGY

## 2022-05-09 PROCEDURE — 3074F PR MOST RECENT SYSTOLIC BLOOD PRESSURE < 130 MM HG: ICD-10-PCS | Mod: CPTII,S$GLB,, | Performed by: OBSTETRICS & GYNECOLOGY

## 2022-05-09 PROCEDURE — 1160F RVW MEDS BY RX/DR IN RCRD: CPT | Mod: CPTII,S$GLB,, | Performed by: OBSTETRICS & GYNECOLOGY

## 2022-05-09 RX ORDER — DORZOLAMIDE HCL 20 MG/ML
1 SOLUTION/ DROPS OPHTHALMIC 2 TIMES DAILY
COMMUNITY
Start: 2022-03-17

## 2022-05-09 RX ORDER — TRETINOIN AND BENZOYL PEROXIDE 30; 1 MG/G; MG/G
CREAM TOPICAL
COMMUNITY
Start: 2022-05-04 | End: 2024-01-10

## 2022-05-09 NOTE — PROGRESS NOTES
Subjective:       Patient ID: Criss Link is a 52 y.o. female.    Chief Complaint:  Well Woman (Last normal pap with Negative HPV was 2020 and last normal mammogram was 2021.)      History of Present Illness  HPI  Annual Exam-Premenopausal  Patient presents for annual exam.  The patient is sexually active. GYN screening history: last pap: approximate date 2020 and was normal and last mammogram: approximate date 2020 and was normal. Last mammogram on 2021 and it was normal.  The patient wears seatbelts: yes. The patient participates in regular exercise: no. Has the patient ever been transfused or tattooed?: no/yes. The patient reports that there is not domestic violence in her life.     She has had a vaginal discharge with odor off and on.  Denies fever and chills.  No pain.  No new partner.  Status post laparoscopic tubal cauterization in     Chronic hypertension  Pre-diabetic    GYN & OB History  Patient's last menstrual period was 2022 (exact date).   Date of Last Pap: 2020    OB History    Para Term  AB Living   2 2 2     2   SAB IAB Ectopic Multiple Live Births           2      # Outcome Date GA Lbr Toney/2nd Weight Sex Delivery Anes PTL Lv   2 Term 04 39w0d  4.077 kg (8 lb 15.8 oz) F Vag-Spont EPI N ROBERT   1 Term 03 39w5d  3.799 kg (8 lb 6 oz) M Vag-Spont EPI N ROBERT      Complications: PIH (pregnancy induced hypertension), third trimester     Past Medical History:   Diagnosis Date    Allergies     Depression     Hypertension     Idiopathic polypoidal choroidal vasculopathy     Prediabetes        Past Surgical History:   Procedure Laterality Date    [facility]AL [facility]  [facility]       Family History   Problem Relation Age of Onset    Heart disease Mother     Hypertension Father     Diabetes Father     Kidney cancer Father     Lymphoma Maternal Grandmother     Cancer Maternal Grandmother     Lymphoma Maternal Uncle        Social  History     Socioeconomic History    Marital status:    Tobacco Use    Smoking status: Never Smoker    Smokeless tobacco: Never Used   Substance and Sexual Activity    Alcohol use: Yes     Comment: Socially    Drug use: Not Currently    Sexual activity: Not Currently     Partners: Male     Birth control/protection: See Surgical Hx   Social History Narrative    Tobacco: None    EtOH: 2 mixed drinks 3 times per week     Drug: None    Employment: Works at  [employer] (Works alongside [employer])     Education: 11th grade      Lives with her mother and 2 teenage children    Partner: since 2021. He is a        Current Outpatient Medications   Medication Sig Dispense Refill    albuterol (PROVENTIL HFA) 90 mcg/actuation inhaler Inhale 2 puffs into the lungs every 6 (six) hours as needed (cough, wheezing, shortness of breath). Rescue 18 g 0    azelastine (ASTELIN) 137 mcg (0.1 %) nasal spray SPRAY 1 SPRAY (137 MCG TOTAL) BY NASAL ROUTE 2 (TWO) TIMES DAILY. 30 mL 5    chlorthalidone (HYGROTEN) 25 MG Tab TAKE 1 TABLET BY MOUTH EVERY DAY 90 tablet 1    dorzolamide (TRUSOPT) 2 % ophthalmic solution Place 1 drop into both eyes 2 (two) times daily.      ibuprofen (ADVIL,MOTRIN) 800 MG tablet Take 1 tablet (800 mg total) by mouth 3 (three) times daily. 90 tablet 3    potassium chloride SA (K-DUR,KLOR-CON) 10 MEQ tablet TAKE 2 TABLETS (20 MEQ TOTAL) BY MOUTH ONCE DAILY. 180 tablet 3    semaglutide (OZEMPIC) 0.25 mg or 0.5 mg(2 mg/1.5 mL) pen injector Inject 0.5 mg into the skin every 7 days. for 12 doses 1 pen 2    sertraline (ZOLOFT) 50 MG tablet Take 1 tablet (50 mg total) by mouth once daily. 90 tablet 3    terbinafine HCL (LAMISIL) 250 mg tablet Take 250 mg by mouth once daily.      triamcinolone acetonide 0.1% (KENALOG) 0.1 % ointment APPLY A SMALL AMOUNT TOP TWICE A DAY APPLY 2 3 TIMES /DAILY TO RIGHT ANKLE RASH      TWYNEO 0.1-3 % Crea Apply topically.       No current  facility-administered medications for this visit.       Review of patient's allergies indicates:  No Known Allergies      Review of Systems  Review of Systems   Constitutional: Negative for activity change, appetite change, chills, fatigue, fever and unexpected weight change.   HENT: Negative for mouth sores.    Respiratory: Negative for cough, shortness of breath and wheezing.    Cardiovascular: Negative for chest pain and palpitations.   Gastrointestinal: Negative for abdominal pain, bloating, blood in stool, constipation, nausea and vomiting.   Endocrine: Negative for diabetes and hot flashes.   Genitourinary: Negative for dysmenorrhea, dyspareunia, dysuria, frequency, hematuria, menorrhagia, menstrual problem, pelvic pain, urgency, vaginal bleeding, vaginal discharge, vaginal pain, urinary incontinence, postcoital bleeding and vaginal odor.   Musculoskeletal: Negative for back pain and myalgias.   Integumentary:  Negative for rash, breast mass and nipple discharge.   Neurological: Negative for seizures and headaches.   Psychiatric/Behavioral: Negative for depression and sleep disturbance. The patient is not nervous/anxious.    Breast: Negative for mass, mastodynia and nipple discharge          Objective:    Physical Exam:   Constitutional: She appears well-developed and well-nourished. No distress.   BMI of 44.4    HENT:   Head: Normocephalic and atraumatic.    Eyes: EOM are normal.      Pulmonary/Chest: Effort normal. No respiratory distress.   Breasts: Non-tender, no engorgement, no masses, no retraction, no discharge. Negative for lymphadenopathy.         Abdominal: Soft. She exhibits no distension. There is no abdominal tenderness. There is no rebound and no guarding.   Acanthosis nigricans in abdomen and groins.     Genitourinary:    Vagina and uterus normal.   No  no vaginal discharge in the vagina.    Genitourinary Comments: Vulva without any obvious lesions.  Urethral meatus normal size and location  without any lesion.  Urethra is non-tender without stricture or discharge.  Bladder is non-tender.  Vaginal vault with good support.  Minimal white discharge noted.  No obvious lesion.  Normal rugation.  Cervix is without any cervical motion tenderness.  No obvious lesion.  Uterus is small, non-tender, normal contour.  Adnexa is without any masses or tenderness.  Perineum without obvious lesion.               Musculoskeletal: Normal range of motion.       Neurological: She is alert.    Skin: Skin is warm and dry.    Psychiatric: She has a normal mood and affect.          Assessment:        1. Well woman exam with routine gynecological exam    2. Screening mammogram, encounter for    3. Colon cancer screening              Plan:          I have discussed with the patient her condition.  Monthly breast examination was instructed, discussed, and encouraged.  Patient was encouraged to consume a low-calorie, low fat diet, and to increase of physical activity.  Healthy habits encouraged.  A Pap smear was not performed according to the USPSTF recommendations.  Mammogram was ordered because of the combination of her age and risk factors, according to ACOG guidelines.  Gonorrhea and Chlamydia testing not performed;  HIV test not offered, again according to guidelines.  Colonoscopy discussed according to ACS guideline.     She will come back to see me in one year for her annual visit.  She can come back to see me sooner as necessary.  All of her questions were answered appropriately to her satisfaction.

## 2022-05-09 NOTE — PROGRESS NOTES
Health Maintenance Due   Topic Date Due    Colorectal Cancer Screening  Never done    COVID-19 Vaccine (3 - Booster for Pfizer series) 09/20/2021    Shingles Vaccine (1 of 2) Never done     Updates were requested from care everywhere.  Chart was reviewed for overdue Proactive [facility] Encounters (PIT[facility]) topics (CRS, Breast Cancer Screening, Eye exam)  Health Maintenance has been updated.  LINKS immunization registry triggered.  Immunizations were reconciled.

## 2022-05-12 ENCOUNTER — PATIENT MESSAGE (OUTPATIENT)
Dept: BARIATRICS | Facility: CLINIC | Age: 52
End: 2022-05-12
Payer: COMMERCIAL

## 2022-05-12 ENCOUNTER — PATIENT MESSAGE (OUTPATIENT)
Dept: OBSTETRICS AND GYNECOLOGY | Facility: CLINIC | Age: 52
End: 2022-05-12
Payer: COMMERCIAL

## 2022-05-12 DIAGNOSIS — N76.0 BACTERIAL VAGINOSIS: ICD-10-CM

## 2022-05-12 DIAGNOSIS — B96.89 BACTERIAL VAGINOSIS: ICD-10-CM

## 2022-05-12 DIAGNOSIS — A59.01 TRICHOMONAS VAGINITIS: Primary | ICD-10-CM

## 2022-05-12 LAB
BACTERIAL VAGINOSIS DNA: POSITIVE
CANDIDA GLABRATA DNA: NEGATIVE
CANDIDA KRUSEI DNA: NEGATIVE
CANDIDA RRNA VAG QL PROBE: NEGATIVE
T VAGINALIS RRNA GENITAL QL PROBE: POSITIVE

## 2022-05-12 RX ORDER — METRONIDAZOLE 500 MG/1
500 TABLET ORAL EVERY 12 HOURS
Qty: 14 TABLET | Refills: 0 | Status: SHIPPED | OUTPATIENT
Start: 2022-05-12 | End: 2022-11-14

## 2022-05-13 ENCOUNTER — PATIENT MESSAGE (OUTPATIENT)
Dept: RHEUMATOLOGY | Facility: CLINIC | Age: 52
End: 2022-05-13
Payer: COMMERCIAL

## 2022-05-13 DIAGNOSIS — M25.562 ACUTE PAIN OF LEFT KNEE: ICD-10-CM

## 2022-05-13 RX ORDER — IBUPROFEN 800 MG/1
800 TABLET ORAL 3 TIMES DAILY
Qty: 90 TABLET | Refills: 0 | Status: SHIPPED | OUTPATIENT
Start: 2022-05-13 | End: 2022-05-13

## 2022-05-13 RX ORDER — IBUPROFEN 800 MG/1
800 TABLET ORAL 3 TIMES DAILY PRN
Qty: 180 TABLET | Refills: 0 | Status: SHIPPED | OUTPATIENT
Start: 2022-05-13 | End: 2022-07-18 | Stop reason: SDUPTHER

## 2022-05-18 NOTE — TELEPHONE ENCOUNTER
Attempted to contact the patient. Patient did not answer. Left detailed voicemail and requested a callback.   Sent patient a message in the basket.    Contacted the [facility]. Spoke to staff member Brandi. She stated that the medication Ozempic is currently on back order. She will be ordering the medication and checking other pharmacies to get it in stock.

## 2022-05-25 RX ORDER — SEMAGLUTIDE 1.34 MG/ML
0.5 INJECTION, SOLUTION SUBCUTANEOUS
Qty: 3 PEN | Refills: 0 | Status: SHIPPED | OUTPATIENT
Start: 2022-05-25 | End: 2022-08-11

## 2022-05-25 NOTE — TELEPHONE ENCOUNTER
----- Message from Cristela Jaffe sent at 5/25/2022  1:18 PM CDT -----  Regarding: refill  Contact: pt @ [phone]  semaglutide (OZEMPIC) 0.25 mg or 0.5 mg(2 mg/1.5 mL) pen injector    Pt stated that insurance only approve 90 days supplies. Asking for a call back

## 2022-06-16 ENCOUNTER — HOSPITAL ENCOUNTER (OUTPATIENT)
Dept: RADIOLOGY | Facility: HOSPITAL | Age: 52
Discharge: HOME | End: 2022-06-16
Attending: OBSTETRICS & GYNECOLOGY
Payer: COMMERCIAL

## 2022-06-16 DIAGNOSIS — Z12.31 SCREENING MAMMOGRAM, ENCOUNTER FOR: ICD-10-CM

## 2022-06-16 PROCEDURE — 77063 MAMMO DIGITAL SCREENING BILAT WITH TOMO: ICD-10-PCS | Mod: 26,,, | Performed by: RADIOLOGY

## 2022-06-16 PROCEDURE — 77063 BREAST TOMOSYNTHESIS BI: CPT | Mod: 26,,, | Performed by: RADIOLOGY

## 2022-06-16 PROCEDURE — 77063 BREAST TOMOSYNTHESIS BI: CPT | Mod: TC,PO

## 2022-06-16 PROCEDURE — 77067 SCR MAMMO BI INCL CAD: CPT | Mod: 26,,, | Performed by: RADIOLOGY

## 2022-06-16 PROCEDURE — 77067 MAMMO DIGITAL SCREENING BILAT WITH TOMO: ICD-10-PCS | Mod: 26,,, | Performed by: RADIOLOGY

## 2022-06-28 ENCOUNTER — CLINICAL SUPPORT (OUTPATIENT)
Dept: REHABILITATION | Facility: HOSPITAL | Age: 52
End: 2022-06-28
Attending: INTERNAL MEDICINE
Payer: COMMERCIAL

## 2022-06-28 DIAGNOSIS — M25.662 DECREASED RANGE OF MOTION (ROM) OF LEFT KNEE: ICD-10-CM

## 2022-06-28 DIAGNOSIS — R29.898 WEAKNESS OF LEFT LOWER EXTREMITY: ICD-10-CM

## 2022-06-28 DIAGNOSIS — M25.562 CHRONIC PAIN OF LEFT KNEE: ICD-10-CM

## 2022-06-28 DIAGNOSIS — G89.29 CHRONIC PAIN OF LEFT KNEE: ICD-10-CM

## 2022-06-28 PROCEDURE — 97161 PT EVAL LOW COMPLEX 20 MIN: CPT | Mod: PN

## 2022-06-28 PROCEDURE — 97110 THERAPEUTIC EXERCISES: CPT | Mod: PN

## 2022-06-29 PROBLEM — R29.898 WEAKNESS OF LEFT LOWER EXTREMITY: Status: ACTIVE | Noted: 2022-06-29

## 2022-06-29 PROBLEM — M25.662 DECREASED RANGE OF MOTION (ROM) OF LEFT KNEE: Status: ACTIVE | Noted: 2022-06-29
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
## 2022-07-12 ENCOUNTER — CLINICAL SUPPORT (OUTPATIENT)
Dept: REHABILITATION | Facility: HOSPITAL | Age: 52
End: 2022-07-12
Attending: INTERNAL MEDICINE
Payer: COMMERCIAL

## 2022-07-12 DIAGNOSIS — M25.662 DECREASED RANGE OF MOTION (ROM) OF LEFT KNEE: Primary | ICD-10-CM

## 2022-07-12 DIAGNOSIS — R29.898 WEAKNESS OF LEFT LOWER EXTREMITY: ICD-10-CM

## 2022-07-12 PROCEDURE — 97110 THERAPEUTIC EXERCISES: CPT | Mod: PN

## 2022-07-12 NOTE — PROGRESS NOTES
"[facility]   Physical Therapy Treatment Note     Name: Criss Link  Clinic Number: [id number]    Therapy Diagnosis:   Encounter Diagnoses   Name Primary?    Decreased range of motion (ROM) of left knee Yes    Weakness of left lower extremity      Physician: Raciel Thayer MD    Visit Date: 7/12/2022    Physician Orders: PT Eval and Treat   Medical Diagnosis from Referral: M25.562,G89.29 (ICD-10-CM) - Chronic pain of left knee  Evaluation Date: 6/28/2022   Authorization Period Expiration: 12/17/2022  Plan of Care Expiration: 8/28/2022  Visit # / Visits authorized: 1/ 1 (pending)   Progress Note Due: 7/28/2022  FOTO: 1/ 1    PTA Visit #: 0/5     Time In: 5:00 pm  Time Out: 6:05 pm  Total Billable Time: 55 minutes    SUBJECTIVE     Pt reports: the knee has been feeling better with the stretches and exercises at home.  She was compliant with home exercise program.  Response to previous treatment: decreased pain  Functional change: first follow-up    Pain: 3/10  Location: left knee      OBJECTIVE     Objective Measures updated at progress report unless specified.     Treatment     Criss received the treatments listed below:      therapeutic exercises to develop strength, endurance, ROM and flexibility for 55 minutes including:    Recumbent bike lv 3 for 5 min for cardiovascular endurance and knee ROM    Quad set 2x10x5"  SLR 2x10x3"   SAQ 3# 2x10x3"  Hamstring stretch 2x30"  Manuel stretch w/ strap 2x30"  VMO LAQ 2x10x3"   Clamshells GTB 2x10   Sidestepping GTB x3 laps  Heel raises 2x15  SLB 3x30"  SL shuttle 2 cords 3x10  DL shuttle 4 cords 3x10    manual therapy techniques: none were applied to the: n/a for 0 minutes, including:      cold pack for 10 minutes to L knee.        Patient Education and Home Exercises     Home Exercises Provided and Patient Education Provided     Education provided:   - HEP, POC    Written Home Exercises Provided: Patient instructed to cont prior HEP. " Exercises were reviewed and Criss was able to demonstrate them prior to the end of the session.  Criss demonstrated good  understanding of the education provided. See EMR under Patient Instructions for exercises provided during therapy sessions    ASSESSMENT     Criss presents today for her first follow up since initial evaluation with reports of improved pain levels. She was able to demo all HEP with min cueing. Progressed dynamic LE strengthening, balance and flexibility exercises with good tolerance and no adverse effects. Continue to progress as tolerated.    Criss Is progressing well towards her goals.   Pt prognosis is Good.     Pt will continue to benefit from skilled outpatient physical therapy to address the deficits listed in the problem list box on initial evaluation, provide pt/family education and to maximize pt's level of independence in the home and community environment.     Pt's spiritual, cultural and educational needs considered and pt agreeable to plan of care and goals.     Anticipated barriers to physical therapy: none    GOALS: Short Term Goals:  4 weeks  1.Report decreased in pain at worse less than  <   / =  4  /10  to increase tolerance for functional mobility.On going  2. Pt to improve L knee range of motion by 25% to allow for improved functional mobility to allow for improvement in IADLs. .On going  3. Increased L LE MMT 1/2 grade to increase tolerance for ADL and work activities.On going  4. Pt to report ability to ambulate for 10 min without increased knee pain  5. Pt to tolerate HEP to improve ROM and independence with ADL's.On going     Long Term Goals: 8 weeks  1.Report decreased in pain at worse less than  <   / =  2  /10  to increase tolerance for functional mobility. On going  2.Pt to improve range of motion by 75% to allow for improved functional mobility to allow for improvement in IADLs. On going  3.Increased L LE MMT 1 grade to increase tolerance for ADL and work  activities.On going  4. Pt will report 40% or less on FOTO knee survey  to demonstrate increase in LE function with every day tasks. On going  5. Pt to be Independent with HEP to improve ROM and independence with ADL's. On going    PLAN     Plan of care Certification: 6/28/2022 to 8/28/2022.     Outpatient Physical Therapy 2 times weekly for 8 weeks to include the following interventions: Gait Training, Manual Therapy, Moist Heat/ Ice, Neuromuscular Re-ed, Patient Education, Therapeutic Activities and Therapeutic Exercise. Dry needling     Everett Velarde, PT

## 2022-07-18 ENCOUNTER — OFFICE VISIT (OUTPATIENT)
Dept: RHEUMATOLOGY | Facility: CLINIC | Age: 52
End: 2022-07-18
Payer: COMMERCIAL

## 2022-07-18 VITALS
HEART RATE: 70 BPM | RESPIRATION RATE: 20 BRPM | SYSTOLIC BLOOD PRESSURE: 141 MMHG | HEIGHT: 65 IN | WEIGHT: 265.44 LBS | DIASTOLIC BLOOD PRESSURE: 84 MMHG | BODY MASS INDEX: 44.22 KG/M2

## 2022-07-18 DIAGNOSIS — Z79.1 NSAID LONG-TERM USE: ICD-10-CM

## 2022-07-18 DIAGNOSIS — Z71.89 COUNSELING AND COORDINATION OF CARE: ICD-10-CM

## 2022-07-18 DIAGNOSIS — M15.9 PRIMARY OSTEOARTHRITIS INVOLVING MULTIPLE JOINTS: Primary | ICD-10-CM

## 2022-07-18 DIAGNOSIS — E66.01 MORBID OBESITY: ICD-10-CM

## 2022-07-18 PROCEDURE — 1159F PR MEDICATION LIST DOCUMENTED IN MEDICAL RECORD: ICD-10-PCS | Mod: CPTII,S$GLB,, | Performed by: INTERNAL MEDICINE

## 2022-07-18 PROCEDURE — 99999 PR PBB SHADOW E&M-EST. PATIENT-LVL III: ICD-10-PCS | Mod: PBBFAC,,, | Performed by: INTERNAL MEDICINE

## 2022-07-18 PROCEDURE — 3079F PR MOST RECENT DIASTOLIC BLOOD PRESSURE 80-89 MM HG: ICD-10-PCS | Mod: CPTII,S$GLB,, | Performed by: INTERNAL MEDICINE

## 2022-07-18 PROCEDURE — 3008F PR BODY MASS INDEX (BMI) DOCUMENTED: ICD-10-PCS | Mod: CPTII,S$GLB,, | Performed by: INTERNAL MEDICINE

## 2022-07-18 PROCEDURE — 3077F SYST BP >= 140 MM HG: CPT | Mod: CPTII,S$GLB,, | Performed by: INTERNAL MEDICINE

## 2022-07-18 PROCEDURE — 99999 PR PBB SHADOW E&M-EST. PATIENT-LVL III: CPT | Mod: PBBFAC,,, | Performed by: INTERNAL MEDICINE

## 2022-07-18 PROCEDURE — 3008F BODY MASS INDEX DOCD: CPT | Mod: CPTII,S$GLB,, | Performed by: INTERNAL MEDICINE

## 2022-07-18 PROCEDURE — 99215 OFFICE O/P EST HI 40 MIN: CPT | Mod: S$GLB,,, | Performed by: INTERNAL MEDICINE

## 2022-07-18 PROCEDURE — 99215 PR OFFICE/OUTPT VISIT, EST, LEVL V, 40-54 MIN: ICD-10-PCS | Mod: S$GLB,,, | Performed by: INTERNAL MEDICINE

## 2022-07-18 PROCEDURE — 1159F MED LIST DOCD IN RCRD: CPT | Mod: CPTII,S$GLB,, | Performed by: INTERNAL MEDICINE

## 2022-07-18 PROCEDURE — 3077F PR MOST RECENT SYSTOLIC BLOOD PRESSURE >= 140 MM HG: ICD-10-PCS | Mod: CPTII,S$GLB,, | Performed by: INTERNAL MEDICINE

## 2022-07-18 PROCEDURE — 3079F DIAST BP 80-89 MM HG: CPT | Mod: CPTII,S$GLB,, | Performed by: INTERNAL MEDICINE

## 2022-07-18 RX ORDER — IBUPROFEN 800 MG/1
800 TABLET ORAL 3 TIMES DAILY PRN
Qty: 180 TABLET | Refills: 1 | Status: SHIPPED | OUTPATIENT
Start: 2022-07-18 | End: 2022-10-04

## 2022-07-18 NOTE — PROGRESS NOTES
RHEUMATOLOGY OUTPATIENT CLINIC NOTE    7/18/2022    Attending Rheumatologist: Dr. Raciel Thayer MD  Primary Care Provider: Charlie Sanon MD   Physician Requesting Consultation: No referring provider defined for this encounter.  Chief Complaint/Reason For Consultation:  No chief complaint on file.      Subjective:       HPI  Criss Link is a 52 y.o. Black or  female with medical history noted below presents for left knee pain.  Patient seen in ED last night and advised to come to Rheumatology. Patient notes about 2 weeks ago the start of left knee pain. No trauma. It has been progressing. She has been trying Tylenol, NSAIDs, and elevation with no relief. Activity makes it worse. Patient notes prior knee pain and trauma as a child but never persistent pain or swelling. Minimal morning stiffness. Patient notes having a lot star bucks (dairy) and seafood, and believes this may have triggered. She has had prior episodes where her elbow swell and becomes painful, diet triggered. +FHX of gout in her mom. No Hx of kidney stones. No new sexual partners, did have vaginal discharge in 5/2021.     Today  Patient here for follow up.   Started physical therapy last month and states significant improvement. Before PT was 8/10 pain, now is 2/10 pain. Denies any other symptoms.     Review of Systems   Constitutional: Negative for chills, fatigue, fever and unexpected weight change.   HENT: Negative for mouth sores and trouble swallowing.    Eyes: Negative for redness and eye dryness.   Respiratory: Negative for cough and shortness of breath.    Cardiovascular: Negative for chest pain.   Gastrointestinal: Negative for abdominal distention, constipation, diarrhea, nausea and vomiting.   Genitourinary: Negative for dysuria, genital sores and vaginal dryness.   Musculoskeletal: Positive for arthralgias and joint swelling. Negative for back pain, gait problem, leg pain, myalgias, neck pain, neck  stiffness and joint deformity.   Integumentary:  Negative for rash.   Neurological: Negative for weakness, numbness and headaches.   Hematological: Negative for adenopathy. Does not bruise/bleed easily.   Psychiatric/Behavioral: Negative for confusion, decreased concentration and sleep disturbance. The patient is not nervous/anxious.    All other systems reviewed and are negative.       Chronic comorbid conditions affecting medical decision making today:  Past Medical History:   Diagnosis Date    Allergies     Depression     Hypertension     Idiopathic polypoidal choroidal vasculopathy     Prediabetes      Past Surgical History:   Procedure Laterality Date    TUBAL LIGATION  12/30/2004    [facility]     Family History   Problem Relation Age of Onset    Heart disease Mother     Hypertension Father     Diabetes Father     Kidney cancer Father     Lymphoma Maternal Grandmother     Cancer Maternal Grandmother     Lymphoma Maternal Uncle      Social History     Substance and Sexual Activity   Alcohol Use Yes    Comment: Socially     Social History     Tobacco Use   Smoking Status Never Smoker   Smokeless Tobacco Never Used     Social History     Substance and Sexual Activity   Drug Use Not Currently       Current Outpatient Medications:     albuterol (PROVENTIL HFA) 90 mcg/actuation inhaler, Inhale 2 puffs into the lungs every 6 (six) hours as needed (cough, wheezing, shortness of breath). Rescue, Disp: 18 g, Rfl: 0    azelastine (ASTELIN) 137 mcg (0.1 %) nasal spray, SPRAY 1 SPRAY (137 MCG TOTAL) BY NASAL ROUTE 2 (TWO) TIMES DAILY., Disp: 30 mL, Rfl: 5    chlorthalidone (HYGROTEN) 25 MG Tab, TAKE 1 TABLET BY MOUTH EVERY DAY, Disp: 90 tablet, Rfl: 1    dorzolamide (TRUSOPT) 2 % ophthalmic solution, Place 1 drop into both eyes 2 (two) times daily., Disp: , Rfl:     ibuprofen (ADVIL,MOTRIN) 800 MG tablet, Take 1 tablet (800 mg total) by mouth 3 (three) times daily as needed for Pain., Disp: 180 tablet, Rfl: 1     metroNIDAZOLE (FLAGYL) 500 MG tablet, Take 1 tablet (500 mg total) by mouth every 12 (twelve) hours., Disp: 14 tablet, Rfl: 0    potassium chloride SA (K-DUR,KLOR-CON) 10 MEQ tablet, TAKE 2 TABLETS (20 MEQ TOTAL) BY MOUTH ONCE DAILY., Disp: 180 tablet, Rfl: 3    semaglutide (OZEMPIC) 0.25 mg or 0.5 mg(2 mg/1.5 mL) pen injector, Inject 0.5 mg into the skin every 7 days. for 12 doses, Disp: 3 pen, Rfl: 0    sertraline (ZOLOFT) 50 MG tablet, Take 1 tablet (50 mg total) by mouth once daily., Disp: 90 tablet, Rfl: 3    terbinafine HCL (LAMISIL) 250 mg tablet, Take 250 mg by mouth once daily., Disp: , Rfl:     triamcinolone acetonide 0.1% (KENALOG) 0.1 % ointment, APPLY A SMALL AMOUNT TOP TWICE A DAY APPLY 2 3 TIMES /DAILY TO RIGHT ANKLE RASH, Disp: , Rfl:     TWYNEO 0.1-3 % Crea, Apply topically., Disp: , Rfl:      Objective:         Vitals:    07/18/22 1528   BP: (!) 141/84   Pulse: 70   Resp: 20     Physical Exam  Knee crepitus, good ROM, no effusion or warmth     Reviewed old and all outside pertinent medical records available.    All lab results personally reviewed and interpreted by me.  Lab Results   Component Value Date    WBC 8.50 11/12/2021    HGB 9.9 (L) 11/12/2021    HCT 32.6 (L) 11/12/2021    MCV 81 (L) 11/12/2021    MCH 24.5 (L) 11/12/2021    MCHC 30.4 (L) 11/12/2021    RDW 16.3 (H) 11/12/2021     (H) 11/12/2021    MPV 9.4 11/12/2021       Lab Results   Component Value Date     11/12/2021    K 4.0 11/12/2021     11/12/2021    CO2 30 (H) 11/12/2021     11/12/2021    BUN 11 11/12/2021    CALCIUM 10.1 11/12/2021    PROT 7.0 11/12/2021    ALBUMIN 3.7 11/12/2021    BILITOT 0.3 11/12/2021    AST 18 11/12/2021    ALKPHOS 67 11/12/2021    ALT 18 11/12/2021       No results found for: COLORU, APPEARANCEUA, SPECGRAV, PHUR, PHUA, PROTEINUA, GLUCOSEU, KETONESU, BLOODU, LEUKOCYTESUR, NITRITE, UROBILINOGEN    Lab Results   Component Value Date    CRP 15.2 (H) 11/12/2021       Lab Results    Component Value Date    SEDRATE 15 11/12/2021       Lab Results   Component Value Date    SEDRATE 15 11/12/2021       No components found for: 25OHVITDTOT, 90ZZVRFM0, 25IXXDRE2, METHODNOTE    Lab Results   Component Value Date    URICACID 5.2 11/12/2021       No components found for: TSPOTTB        Imaging:  All imaging reviewed and independently interpreted by me.         ASSESSMENT / PLAN:     Criss Link is a 52 y.o. Black or  female with:      1. Osteoarthritis   - discussed results, no evidence of infection or crystals  - improved pain and swelling s/p physical therapy  - continue NSAIDs  - reassurance    2. Chronic NSAID use  - no history of GI bleed or coronary artery disease.  - previous labs without features of CKD.  - clinical significance side effect of long-term NSAID use discussed in detail.  - recommended for alternative therapies for pain management.    3. Other specified counseling  - over 10 minutes spent regarding below topics:  - Immunization counseling done.  - Weight loss counseling done.  - Nutrition and exercise counseling.  - Limitation of alcohol consumption.  - Regular exercise:  Aerobic and resistance.  - Medication counseling provided.    4. Morbid Obesity  - would benefit from decreasing at least 10% of body weight.  - recommended goal of losing 1 lb per week.  - consider nutritionist evaluation.      Follow up in about 6 months (around 1/18/2023).    Method of contact with patient concerns: [facility]    Disclaimer:  This note is prepared using voice recognition software and as such is likely to have errors and has not been proof read. Please contact me for questions.     Time spent: 40 minutes in face to face discussion concerning diagnosis, prognosis, review of lab and test results, benefits of treatment as well as management of disease, counseling of patient and coordination of care between various health care providers.  Greater than half the time  spent was used for coordination of care and counseling of patient.    Raciel Thayer M.D.  Rheumatology Department   [facility]

## 2022-07-20 ENCOUNTER — CLINICAL SUPPORT (OUTPATIENT)
Dept: REHABILITATION | Facility: HOSPITAL | Age: 52
End: 2022-07-20
Attending: INTERNAL MEDICINE
Payer: COMMERCIAL

## 2022-07-20 DIAGNOSIS — R29.898 WEAKNESS OF LEFT LOWER EXTREMITY: ICD-10-CM

## 2022-07-20 DIAGNOSIS — M25.662 DECREASED RANGE OF MOTION (ROM) OF LEFT KNEE: Primary | ICD-10-CM

## 2022-07-20 PROCEDURE — 97110 THERAPEUTIC EXERCISES: CPT | Mod: PN,CQ

## 2022-07-20 NOTE — PROGRESS NOTES
"[facility]   Physical Therapy Treatment Note     Name: Criss Link  Clinic Number: [id number]    Therapy Diagnosis:   Encounter Diagnoses   Name Primary?    Decreased range of motion (ROM) of left knee Yes    Weakness of left lower extremity      Physician: Raciel Thayer MD    Visit Date: 7/20/2022    Physician Orders: PT Eval and Treat   Medical Diagnosis from Referral: M25.562,G89.29 (ICD-10-CM) - Chronic pain of left knee  Evaluation Date: 6/28/2022   Authorization Period Expiration: 12/17/2022  Plan of Care Expiration: 8/28/2022  Visit # / Visits authorized: 3/ 20   Progress Note Due: 7/28/2022  FOTO: 1/ 1    PTA Visit #: 1/5     Time In: 430  Time Out: 520  Total Billable Time: 50 minutes    SUBJECTIVE     Pt reports: Pt denies increased pain following last session. States he is not having any pain currently. Pt states she experiences popping and discomfort in L knee when standing up after sitting for a long period of time. Pt states pain does not last.   She was compliant with home exercise program.  Response to previous treatment: decreased pain  Functional change: ongoing    Pain: 0/10  Location: left knee      OBJECTIVE     Objective Measures updated at progress report unless specified.     Treatment     Criss received the treatments listed below:      therapeutic exercises to develop strength, endurance, ROM and flexibility for 50 minutes including:    Recumbent bike lv 3 for 6 min for cardiovascular endurance and knee ROM  +gastroc stretch on wedge 3x30"    Quad set 2x10x5"  SLR 2x10x3"   SAQ +4# 2x10x3"  Hamstring stretch 2x30"  Manuel stretch w/ strap 2x30"  +prone hip flexor/quad stretch with 1/2foam under knee 3x30"  VMO LAQ 2x10x3" +2#  Clamshells GTB 2x10   +seated hamstring curls RTB 3x10    Sidestepping GTB x3 laps  Heel raises 2x15  SLB +airex 3x30"  SL shuttle 2 cords 3x10  DL shuttle 4 cords 3x10    manual therapy techniques: none were applied to the: n/a " for 0 minutes, including:      cold pack for 00 minutes to L knee.        Patient Education and Home Exercises     Home Exercises Provided and Patient Education Provided     Education provided:   -     Written Home Exercises Provided: Patient instructed to cont prior HEP. Exercises were reviewed and Criss was able to demonstrate them prior to the end of the session.  Criss demonstrated good  understanding of the education provided. See EMR under Patient Instructions for exercises provided during therapy sessions    ASSESSMENT     Pt denies pain or soreness following last session therefore progressed some exercises today. She was able to tolerate increased weight with LAQ and SAQ with appropriate challenge/fatigue reported but without complaints of knee pain. Also progressed single leg balance to airex today which was more challenging and pt required occasional touch assist to correct LOB. Pt states she has difficulty performing breonna stretch at home therefore introduced prone quad/hip flexor stretch as alternative.Good response to stretches performed in therapy today.  Will continue progressing next visit as appropriate.     Criss Is progressing well towards her goals.   Pt prognosis is Good.     Pt will continue to benefit from skilled outpatient physical therapy to address the deficits listed in the problem list box on initial evaluation, provide pt/family education and to maximize pt's level of independence in the home and community environment.     Pt's spiritual, cultural and educational needs considered and pt agreeable to plan of care and goals.     Anticipated barriers to physical therapy: none    GOALS: Short Term Goals:  4 weeks  1.Report decreased in pain at worse less than  <   / =  4  /10  to increase tolerance for functional mobility.On going  2. Pt to improve L knee range of motion by 25% to allow for improved functional mobility to allow for improvement in IADLs. .On going  3. Increased L LE MMT  1/2 grade to increase tolerance for ADL and work activities.On going  4. Pt to report ability to ambulate for 10 min without increased knee pain  5. Pt to tolerate HEP to improve ROM and independence with ADL's.On going     Long Term Goals: 8 weeks  1.Report decreased in pain at worse less than  <   / =  2  /10  to increase tolerance for functional mobility. On going  2.Pt to improve range of motion by 75% to allow for improved functional mobility to allow for improvement in IADLs. On going  3.Increased L LE MMT 1 grade to increase tolerance for ADL and work activities.On going  4. Pt will report 40% or less on FOTO knee survey  to demonstrate increase in LE function with every day tasks. On going  5. Pt to be Independent with HEP to improve ROM and independence with ADL's. On going    PLAN     Plan of care Certification: 6/28/2022 to 8/28/2022.     Outpatient Physical Therapy 2 times weekly for 8 weeks to include the following interventions: Gait Training, Manual Therapy, Moist Heat/ Ice, Neuromuscular Re-ed, Patient Education, Therapeutic Activities and Therapeutic Exercise. Dry needling     Randal Campbell, PTA

## 2022-07-25 ENCOUNTER — CLINICAL SUPPORT (OUTPATIENT)
Dept: REHABILITATION | Facility: HOSPITAL | Age: 52
End: 2022-07-25
Attending: INTERNAL MEDICINE
Payer: COMMERCIAL

## 2022-07-25 DIAGNOSIS — M25.662 DECREASED RANGE OF MOTION (ROM) OF LEFT KNEE: Primary | ICD-10-CM

## 2022-07-25 DIAGNOSIS — R29.898 WEAKNESS OF LEFT LOWER EXTREMITY: ICD-10-CM

## 2022-07-25 PROCEDURE — 97110 THERAPEUTIC EXERCISES: CPT | Mod: PN

## 2022-07-25 NOTE — PROGRESS NOTES
"[facility]   Physical Therapy Treatment Note     Name: Criss Link  Clinic Number: [id number]    Therapy Diagnosis:   Encounter Diagnoses   Name Primary?    Decreased range of motion (ROM) of left knee Yes    Weakness of left lower extremity      Physician: Raciel Thayer MD    Visit Date: 7/25/2022    Physician Orders: PT Eval and Treat   Medical Diagnosis from Referral: M25.562,G89.29 (ICD-10-CM) - Chronic pain of left knee  Evaluation Date: 6/28/2022   Authorization Period Expiration: 12/17/2022  Plan of Care Expiration: 8/28/2022  Visit # / Visits authorized: 4/ 20   Progress Note Due: 7/28/2022  FOTO: 1/ 1    PTA Visit #: 1/5     Time In: 0445 PM  Time Out: 0535  Total Billable Time: 50 minutes    SUBJECTIVE     Pt reports: Increase L knee pain in the past couple of day. She can't think of anything that could've caused it.  She was compliant with home exercise program.  Response to previous treatment: Good  Functional change: ongoing    Pain: 4/10  Location: left knee      OBJECTIVE     Objective Measures updated at progress report unless specified.     Treatment     Criss received the treatments listed below:      therapeutic exercises to develop strength, endurance, ROM and flexibility for 50 minutes including:    Recumbent bike lv 3 for 6 min for cardiovascular endurance and knee ROM  Quad set 1x15x5"  SLR 2x10x3" L LE  +SLR VMO (Hip ER) 2x10  HL Bridges 2x10  Clamshells GTB 2x10   seated hamstring curls GTB 3x10  SL shuttle 2 cords 3x10  DL shuttle 1 black and 1 red cord 3x10  Gastroc stretch on wedge 3x30"  Hamstring stretch 2x30"  prone hip flexor/quad stretch with 1/2foam under knee 3x30"          NP:    Manuel stretch w/ strap 2x30"  SAQ +4# 2x10x3"  VMO LAQ 2x10x3" +2#  Sidestepping GTB x3 laps  Heel raises 2x15  SLB +airex 3x30"      manual therapy techniques: none were applied to the: n/a for 0 minutes, including:      cold pack for 00 minutes to L " knee.        Patient Education and Home Exercises     Home Exercises Provided and Patient Education Provided     Education provided:   - Recommended stretch out strap    Written Home Exercises Provided: Patient instructed to cont prior HEP. Exercises were reviewed and Criss was able to demonstrate them prior to the end of the session.  Criss demonstrated good  understanding of the education provided. See EMR under Patient Instructions for exercises provided during therapy sessions    ASSESSMENT       Pt tolerated tx well. Continued with exercises from previous session. Reported some pain with end range knee flexion during shuttle press. Reported relief with hamstring and quad stretching. Recommended stretch out strap to aid with stretches at home. Therapist provided verbal/tactile cues to maintain proper form. Pt reported no adverse effects to exercises. Appropriate fatigue level achieved at end of session  Pt would benefit from continued skilled physical therapy in order to reach pt's goals. Re-assessment due next visit.     Criss Is progressing well towards her goals.   Pt prognosis is Good.     Pt will continue to benefit from skilled outpatient physical therapy to address the deficits listed in the problem list box on initial evaluation, provide pt/family education and to maximize pt's level of independence in the home and community environment.     Pt's spiritual, cultural and educational needs considered and pt agreeable to plan of care and goals.     Anticipated barriers to physical therapy: none    GOALS: Short Term Goals:  4 weeks  1.Report decreased in pain at worse less than  <   / =  4  /10  to increase tolerance for functional mobility.On going  2. Pt to improve L knee range of motion by 25% to allow for improved functional mobility to allow for improvement in IADLs. .On going  3. Increased L LE MMT 1/2 grade to increase tolerance for ADL and work activities.On going  4. Pt to report ability to  ambulate for 10 min without increased knee pain  5. Pt to tolerate HEP to improve ROM and independence with ADL's.On going     Long Term Goals: 8 weeks  1.Report decreased in pain at worse less than  <   / =  2  /10  to increase tolerance for functional mobility. On going  2.Pt to improve range of motion by 75% to allow for improved functional mobility to allow for improvement in IADLs. On going  3.Increased L LE MMT 1 grade to increase tolerance for ADL and work activities.On going  4. Pt will report 40% or less on FOTO knee survey  to demonstrate increase in LE function with every day tasks. On going  5. Pt to be Independent with HEP to improve ROM and independence with ADL's. On going    PLAN     Plan of care Certification: 6/28/2022 to 8/28/2022.     Outpatient Physical Therapy 2 times weekly for 8 weeks to include the following interventions: Gait Training, Manual Therapy, Moist Heat/ Ice, Neuromuscular Re-ed, Patient Education, Therapeutic Activities and Therapeutic Exercise. Dry needling     Kvng Vaughn, PT

## 2022-08-03 ENCOUNTER — PATIENT MESSAGE (OUTPATIENT)
Dept: BARIATRICS | Facility: CLINIC | Age: 52
End: 2022-08-03
Payer: COMMERCIAL

## 2022-08-08 ENCOUNTER — CLINICAL SUPPORT (OUTPATIENT)
Dept: REHABILITATION | Facility: HOSPITAL | Age: 52
End: 2022-08-08
Attending: INTERNAL MEDICINE
Payer: COMMERCIAL

## 2022-08-08 DIAGNOSIS — R29.898 WEAKNESS OF LEFT LOWER EXTREMITY: ICD-10-CM

## 2022-08-08 DIAGNOSIS — M25.662 DECREASED RANGE OF MOTION (ROM) OF LEFT KNEE: Primary | ICD-10-CM

## 2022-08-08 PROCEDURE — 97110 THERAPEUTIC EXERCISES: CPT | Mod: PN

## 2022-08-08 NOTE — PROGRESS NOTES
[facility]   Physical Therapy Treatment Note     Name: Criss Link  Clinic Number: [id number]    Therapy Diagnosis:   Encounter Diagnoses   Name Primary?    Decreased range of motion (ROM) of left knee Yes    Weakness of left lower extremity      Physician: Raciel Thayer MD    Visit Date: 8/8/2022    Physician Orders: PT Eval and Treat   Medical Diagnosis from Referral: M25.562,G89.29 (ICD-10-CM) - Chronic pain of left knee  Evaluation Date: 6/28/2022   Authorization Period Expiration: 12/31/2022  Plan of Care Expiration: 8/28/2022  Visit # / Visits authorized: 5/ 20   Progress Note Due: 9/8/2022  FOTO: 1/ 1    PTA Visit #: 0/5     Time In:1733  Time Out: 1826  Total Billable Time: 51 minutes    SUBJECTIVE     Pt reports: She is having pain in her left knee at the moment.   She was compliant with home exercise program.  Response to previous treatment: Good  Functional change: ongoing    Pain: 5/10  Location: left knee      OBJECTIVE     Objective Measures updated at progress report unless specified.     8/8/22:    CMS Impairment/Limitation/Restriction for FOTO Knee Survey    Therapist reviewed FOTO scores for Criss Link on 8/8/2022.   FOTO documents entered into [employer] - see Media section.    Limitation Score: 37%       Range of Motion:   Knee Left active Left Passive   Flexion 111 112   Extension 2 0      Knee Right active Right Passive   Flexion 115 122   Extension 3 5         Lower Extremity Strength   Right LE   Left LE     Knee extension: 5/5 Knee extension: 4+/5   Knee flexion: 4+/5 Knee flexion: 5/5   Hip flexion: 5/5 Hip flexion: 5/5   Hip extension:  4+/5 Hip extension: 4+/5   Hip abduction: 4+/5 Hip abduction: 4+/5   Hip adduction: 4+/5 Hip adduction 4+/5   Ankle dorsiflexion: 5/5 Ankle dorsiflexion: 5/5   Ankle plantarflexion: 5/5 Ankle plantarflexion: 4+/5        Treatment     Criss received the treatments listed below:      therapeutic exercises to develop  "strength, endurance, ROM and flexibility for 51 minutes including reassessment:    Recumbent bike lv 3 for 6 min for cardiovascular endurance and knee ROM  Quad set 1x15x5"  SLR 2x10x3" L LE  SLR VMO (Hip ER) 2x10  Bridges 2x10  Clamshells GTB 2x10   seated hamstring curls+ Blue TB 2x10 ea  SL shuttle 2 cords 3x10  DL shuttle 1 black and 1 red cord 3x10  Gastroc stretch on wedge 3x30"  Hamstring stretch 2x30"  prone hip flexor/quad stretch with 1/2foam under knee 3x30"      NP:  Manuel stretch w/ strap 2x30"  SAQ +4# 2x10x3"  VMO LAQ 2x10x3" +2#  Sidestepping GTB x3 laps  Heel raises 2x15  SLB +airex 3x30"    manual therapy techniques: none were applied to the: n/a for 0 minutes, including:    cold pack for 00 minutes to L knee.      Patient Education and Home Exercises     Home Exercises Provided and Patient Education Provided     Education provided:   -     Written Home Exercises Provided: Patient instructed to cont prior HEP. Exercises were reviewed and Criss was able to demonstrate them prior to the end of the session.  Criss demonstrated good  understanding of the education provided. See EMR under Patient Instructions for exercises provided during therapy sessions    ASSESSMENT     Pt was reassessed and has improved with independence with HEP, ROM, strength, and overall tolerance to functional tasks as seen by FOTO score. At this time she would like to begin to transition to gym program to continue to maintain and progress after DC from therapy. PT to progress next visit with gym equipment to prepare pt for discharge. Pt was appropriately fatigued with all strengthening exercises today.      Criss Is progressing well towards her goals.   Pt prognosis is Good.     Pt will continue to benefit from skilled outpatient physical therapy to address the deficits listed in the problem list box on initial evaluation, provide pt/family education and to maximize pt's level of independence in the home and community " environment.     Pt's spiritual, cultural and educational needs considered and pt agreeable to plan of care and goals.     Anticipated barriers to physical therapy: none    GOALS: Short Term Goals:  4 weeks progressing  1.Report decreased in pain at worse less than  <   / =  4  /10  to increase tolerance for functional mobility.On going  2. Pt to improve L knee range of motion by 25% to allow for improved functional mobility to allow for improvement in IADLs. .met  3. Increased L LE MMT 1/2 grade to increase tolerance for ADL and work activities.met  4. Pt to report ability to ambulate for 10 min without increased knee pain  5. Pt to tolerate HEP to improve ROM and independence with ADL's.met     Long Term Goals: 8 weeks progressing  1.Report decreased in pain at worse less than  <   / =  2  /10  to increase tolerance for functional mobility. On going  2.Pt to improve range of motion by 75% to allow for improved functional mobility to allow for improvement in IADLs. met  3.Increased L LE MMT 1 grade to increase tolerance for ADL and work activitiesmet  4. Pt will report 40% or less on FOTO knee survey  to demonstrate increase in LE function with every day tasks. met  5. Pt to be Independent with HEP to improve ROM and independence with ADL's. On going    PLAN     Plan of care Certification: 6/28/2022 to 8/28/2022.     Outpatient Physical Therapy 2 times weekly for 8 weeks to include the following interventions: Gait Training, Manual Therapy, Moist Heat/ Ice, Neuromuscular Re-ed, Patient Education, Therapeutic Activities and Therapeutic Exercise. Madelaine Montana, PT,DPT  08/08/2022

## 2022-08-10 DIAGNOSIS — F41.9 ANXIETY AND DEPRESSION: ICD-10-CM

## 2022-08-10 DIAGNOSIS — J06.9 VIRAL URI WITH COUGH: ICD-10-CM

## 2022-08-10 DIAGNOSIS — F32.A ANXIETY AND DEPRESSION: ICD-10-CM

## 2022-08-10 RX ORDER — SERTRALINE HYDROCHLORIDE 25 MG/1
TABLET, FILM COATED ORAL
Qty: 90 TABLET | Refills: 0 | OUTPATIENT
Start: 2022-08-10

## 2022-08-10 RX ORDER — ALBUTEROL SULFATE 90 UG/1
AEROSOL, METERED RESPIRATORY (INHALATION)
Qty: 18 G | Refills: 0 | OUTPATIENT
Start: 2022-08-10

## 2022-08-22 ENCOUNTER — PATIENT MESSAGE (OUTPATIENT)
Dept: RHEUMATOLOGY | Facility: CLINIC | Age: 52
End: 2022-08-22
Payer: COMMERCIAL

## 2022-10-04 ENCOUNTER — OFFICE VISIT (OUTPATIENT)
Dept: RHEUMATOLOGY | Facility: CLINIC | Age: 52
End: 2022-10-04
Payer: COMMERCIAL

## 2022-10-04 VITALS
WEIGHT: 276.88 LBS | RESPIRATION RATE: 20 BRPM | HEIGHT: 65 IN | HEART RATE: 72 BPM | OXYGEN SATURATION: 98 % | DIASTOLIC BLOOD PRESSURE: 80 MMHG | SYSTOLIC BLOOD PRESSURE: 140 MMHG | BODY MASS INDEX: 46.13 KG/M2

## 2022-10-04 DIAGNOSIS — Z71.89 COUNSELING AND COORDINATION OF CARE: ICD-10-CM

## 2022-10-04 DIAGNOSIS — Z79.1 NSAID LONG-TERM USE: ICD-10-CM

## 2022-10-04 DIAGNOSIS — E66.01 MORBID OBESITY: ICD-10-CM

## 2022-10-04 DIAGNOSIS — M15.9 PRIMARY OSTEOARTHRITIS INVOLVING MULTIPLE JOINTS: Primary | ICD-10-CM

## 2022-10-04 PROCEDURE — 99999 PR PBB SHADOW E&M-EST. PATIENT-LVL IV: CPT | Mod: PBBFAC,,, | Performed by: INTERNAL MEDICINE

## 2022-10-04 PROCEDURE — 3008F BODY MASS INDEX DOCD: CPT | Mod: CPTII,S$GLB,, | Performed by: INTERNAL MEDICINE

## 2022-10-04 PROCEDURE — 1159F PR MEDICATION LIST DOCUMENTED IN MEDICAL RECORD: ICD-10-PCS | Mod: CPTII,S$GLB,, | Performed by: INTERNAL MEDICINE

## 2022-10-04 PROCEDURE — 20610 DRAIN/INJ JOINT/BURSA W/O US: CPT | Mod: LT,S$GLB,, | Performed by: INTERNAL MEDICINE

## 2022-10-04 PROCEDURE — 99214 OFFICE O/P EST MOD 30 MIN: CPT | Mod: 25,S$GLB,, | Performed by: INTERNAL MEDICINE

## 2022-10-04 PROCEDURE — 99999 PR PBB SHADOW E&M-EST. PATIENT-LVL IV: ICD-10-PCS | Mod: PBBFAC,,, | Performed by: INTERNAL MEDICINE

## 2022-10-04 PROCEDURE — 3079F PR MOST RECENT DIASTOLIC BLOOD PRESSURE 80-89 MM HG: ICD-10-PCS | Mod: CPTII,S$GLB,, | Performed by: INTERNAL MEDICINE

## 2022-10-04 PROCEDURE — 1159F MED LIST DOCD IN RCRD: CPT | Mod: CPTII,S$GLB,, | Performed by: INTERNAL MEDICINE

## 2022-10-04 PROCEDURE — 3077F SYST BP >= 140 MM HG: CPT | Mod: CPTII,S$GLB,, | Performed by: INTERNAL MEDICINE

## 2022-10-04 PROCEDURE — 3079F DIAST BP 80-89 MM HG: CPT | Mod: CPTII,S$GLB,, | Performed by: INTERNAL MEDICINE

## 2022-10-04 PROCEDURE — 3077F PR MOST RECENT SYSTOLIC BLOOD PRESSURE >= 140 MM HG: ICD-10-PCS | Mod: CPTII,S$GLB,, | Performed by: INTERNAL MEDICINE

## 2022-10-04 PROCEDURE — 20610 LARGE JOINT ASPIRATION/INJECTION: L KNEE: ICD-10-PCS | Mod: LT,S$GLB,, | Performed by: INTERNAL MEDICINE

## 2022-10-04 PROCEDURE — 99214 PR OFFICE/OUTPT VISIT, EST, LEVL IV, 30-39 MIN: ICD-10-PCS | Mod: 25,S$GLB,, | Performed by: INTERNAL MEDICINE

## 2022-10-04 PROCEDURE — 3008F PR BODY MASS INDEX (BMI) DOCUMENTED: ICD-10-PCS | Mod: CPTII,S$GLB,, | Performed by: INTERNAL MEDICINE

## 2022-10-04 RX ORDER — LIDOCAINE HYDROCHLORIDE 10 MG/ML
2 INJECTION INFILTRATION; PERINEURAL
Status: DISCONTINUED | OUTPATIENT
Start: 2022-10-04 | End: 2022-10-04 | Stop reason: HOSPADM

## 2022-10-04 RX ORDER — TRIAMCINOLONE ACETONIDE 40 MG/ML
40 INJECTION, SUSPENSION INTRA-ARTICULAR; INTRAMUSCULAR
Status: DISCONTINUED | OUTPATIENT
Start: 2022-10-04 | End: 2022-10-04 | Stop reason: HOSPADM

## 2022-10-04 RX ORDER — MELOXICAM 15 MG/1
15 TABLET ORAL DAILY
Qty: 30 TABLET | Refills: 6 | Status: SHIPPED | OUTPATIENT
Start: 2022-10-04 | End: 2023-04-05

## 2022-10-04 RX ADMIN — TRIAMCINOLONE ACETONIDE 40 MG: 40 INJECTION, SUSPENSION INTRA-ARTICULAR; INTRAMUSCULAR at 11:10

## 2022-10-04 RX ADMIN — LIDOCAINE HYDROCHLORIDE 2 ML: 10 INJECTION INFILTRATION; PERINEURAL at 11:10

## 2022-10-04 NOTE — PROCEDURES
Large Joint Aspiration/Injection: L knee    Date/Time: 10/4/2022 11:30 AM  Performed by: Raciel Thayer MD  Authorized by: Raciel Thayer MD     Consent Done?:  Yes (Verbal)  Indications:  Arthritis and pain  Site marked: the procedure site was marked    Timeout: prior to procedure the correct patient, procedure, and site was verified    Prep: patient was prepped and draped in usual sterile fashion    Local anesthesia used?: No      Details:  Needle Size:  25 G  Ultrasonic Guidance for needle placement?: No    Approach:  Anterolateral  Location:  Knee  Site:  L knee  Medications:  2 mL LIDOcaine HCL 10 mg/ml (1%) 10 mg/mL (1 %); 40 mg triamcinolone acetonide 40 mg/mL  Patient tolerance:  Patient tolerated the procedure well with no immediate complications

## 2022-10-04 NOTE — PROGRESS NOTES
RHEUMATOLOGY OUTPATIENT CLINIC NOTE    10/4/2022    Attending Rheumatologist: Dr. Raciel Thayer MD  Primary Care Provider: Charlie Sanon MD   Physician Requesting Consultation: No referring provider defined for this encounter.  Chief Complaint/Reason For Consultation:  No chief complaint on file.      Subjective:       HPI  Criss Link is a 52 y.o. Black or  female with medical history noted below presents for left knee pain.  Patient seen in ED last night and advised to come to Rheumatology. Patient notes about 2 weeks ago the start of left knee pain. No trauma. It has been progressing. She has been trying Tylenol, NSAIDs, and elevation with no relief. Activity makes it worse. Patient notes prior knee pain and trauma as a child but never persistent pain or swelling. Minimal morning stiffness. Patient notes having a lot star bucks (dairy) and seafood, and believes this may have triggered. She has had prior episodes where her elbow swell and becomes painful, diet triggered. +FHX of gout in her mom. No Hx of kidney stones. No new sexual partners, did have vaginal discharge in 5/2021.     Today  Patient here for follow up.   Last visit NSAIDs continued for OA. She notes she has pain flares, used diuretic and noted this helped with swelling and pain. Otherwise pain continues to be pain issue.     Review of Systems   Constitutional:  Negative for chills, fatigue, fever and unexpected weight change.   HENT:  Negative for mouth sores and trouble swallowing.    Eyes:  Negative for redness and eye dryness.   Respiratory:  Negative for cough and shortness of breath.    Cardiovascular:  Negative for chest pain.   Gastrointestinal:  Negative for abdominal distention, constipation, diarrhea, nausea and vomiting.   Genitourinary:  Negative for dysuria, genital sores and vaginal dryness.   Musculoskeletal:  Positive for arthralgias and joint swelling. Negative for back pain, gait problem, leg  pain, myalgias, neck pain, neck stiffness and joint deformity.   Integumentary:  Negative for rash.   Neurological:  Negative for weakness, numbness and headaches.   Hematological:  Negative for adenopathy. Does not bruise/bleed easily.   Psychiatric/Behavioral:  Negative for confusion, decreased concentration and sleep disturbance. The patient is not nervous/anxious.    All other systems reviewed and are negative.     Chronic comorbid conditions affecting medical decision making today:  Past Medical History:   Diagnosis Date    Allergies     Depression     Hypertension     Idiopathic polypoidal choroidal vasculopathy     Prediabetes      Past Surgical History:   Procedure Laterality Date    TUBAL LIGATION  12/30/2004    [facility]     Family History   Problem Relation Age of Onset    Heart disease Mother     Hypertension Father     Diabetes Father     Kidney cancer Father     Lymphoma Maternal Grandmother     Cancer Maternal Grandmother     Lymphoma Maternal Uncle      Social History     Substance and Sexual Activity   Alcohol Use Yes    Comment: Socially     Social History     Tobacco Use   Smoking Status Never   Smokeless Tobacco Never     Social History     Substance and Sexual Activity   Drug Use Not Currently       Current Outpatient Medications:     albuterol (PROVENTIL HFA) 90 mcg/actuation inhaler, Inhale 2 puffs into the lungs every 6 (six) hours as needed (cough, wheezing, shortness of breath). Rescue, Disp: 18 g, Rfl: 0    azelastine (ASTELIN) 137 mcg (0.1 %) nasal spray, SPRAY 1 SPRAY (137 MCG TOTAL) BY NASAL ROUTE 2 (TWO) TIMES DAILY., Disp: 30 mL, Rfl: 5    chlorthalidone (HYGROTEN) 25 MG Tab, TAKE 1 TABLET BY MOUTH EVERY DAY, Disp: 90 tablet, Rfl: 1    dorzolamide (TRUSOPT) 2 % ophthalmic solution, Place 1 drop into both eyes 2 (two) times daily., Disp: , Rfl:     meloxicam (MOBIC) 15 MG tablet, Take 1 tablet (15 mg total) by mouth once daily., Disp: 30 tablet, Rfl: 6    metroNIDAZOLE (FLAGYL) 500 MG  tablet, Take 1 tablet (500 mg total) by mouth every 12 (twelve) hours., Disp: 14 tablet, Rfl: 0    potassium chloride SA (K-DUR,KLOR-CON) 10 MEQ tablet, TAKE 2 TABLETS (20 MEQ TOTAL) BY MOUTH ONCE DAILY., Disp: 180 tablet, Rfl: 3    sertraline (ZOLOFT) 50 MG tablet, Take 1 tablet (50 mg total) by mouth once daily., Disp: 90 tablet, Rfl: 3    terbinafine HCL (LAMISIL) 250 mg tablet, Take 250 mg by mouth once daily., Disp: , Rfl:     triamcinolone acetonide 0.1% (KENALOG) 0.1 % ointment, APPLY A SMALL AMOUNT TOP TWICE A DAY APPLY 2 3 TIMES /DAILY TO RIGHT ANKLE RASH, Disp: , Rfl:     TWYNEO 0.1-3 % Crea, Apply topically., Disp: , Rfl:      Objective:         Vitals:    10/04/22 1125   BP: (!) 140/80   Pulse: 72   Resp: 20     Physical Exam  Knee crepitus, good ROM, no effusion or warmth     Reviewed old and all outside pertinent medical records available.    All lab results personally reviewed and interpreted by me.  Lab Results   Component Value Date    WBC 8.50 11/12/2021    HGB 9.9 (L) 11/12/2021    HCT 32.6 (L) 11/12/2021    MCV 81 (L) 11/12/2021    MCH 24.5 (L) 11/12/2021    MCHC 30.4 (L) 11/12/2021    RDW 16.3 (H) 11/12/2021     (H) 11/12/2021    MPV 9.4 11/12/2021       Lab Results   Component Value Date     11/12/2021    K 4.0 11/12/2021     11/12/2021    CO2 30 (H) 11/12/2021     11/12/2021    BUN 11 11/12/2021    CALCIUM 10.1 11/12/2021    PROT 7.0 11/12/2021    ALBUMIN 3.7 11/12/2021    BILITOT 0.3 11/12/2021    AST 18 11/12/2021    ALKPHOS 67 11/12/2021    ALT 18 11/12/2021       No results found for: COLORU, APPEARANCEUA, SPECGRAV, PHUR, PHUA, PROTEINUA, GLUCOSEU, KETONESU, BLOODU, LEUKOCYTESUR, NITRITE, UROBILINOGEN    Lab Results   Component Value Date    CRP 15.2 (H) 11/12/2021       Lab Results   Component Value Date    SEDRATE 15 11/12/2021       Lab Results   Component Value Date    SEDRATE 15 11/12/2021       No components found for: 25OHVITDTOT, 36WSAYKV3, 47QSEUZX7,  METHODNOTE    Lab Results   Component Value Date    URICACID 5.2 11/12/2021       No components found for: TSPOTTB        Imaging:  All imaging reviewed and independently interpreted by me.         ASSESSMENT / PLAN:     Criss Link is a 52 y.o. Black or  female with:      1. Osteoarthritis   - chronic   - CSI today, see procedure note, post procedure precautions discussed, ice area   - switch Ibuprofen to Meloxicam   - reassurance    2. Chronic NSAID use  - no history of GI bleed or coronary artery disease.  - previous labs without features of CKD.  - clinical significance side effect of long-term NSAID use discussed in detail.  - recommended for alternative therapies for pain management.    3. Other specified counseling  - over 10 minutes spent regarding below topics:  - Immunization counseling done.  - Weight loss counseling done.  - Nutrition and exercise counseling.  - Limitation of alcohol consumption.  - Regular exercise:  Aerobic and resistance.  - Medication counseling provided.    4. Morbid Obesity  - would benefit from decreasing at least 10% of body weight.  - recommended goal of losing 1 lb per week.  - consider nutritionist evaluation.      Follow up in about 6 months (around 4/4/2023).    Method of contact with patient concerns: [facility]    Disclaimer:  This note is prepared using voice recognition software and as such is likely to have errors and has not been proof read. Please contact me for questions.     Time spent: 30 minutes in face to face discussion concerning diagnosis, prognosis, review of lab and test results, benefits of treatment as well as management of disease, counseling of patient and coordination of care between various health care providers.  Greater than half the time spent was used for coordination of care and counseling of patient.    Raciel Thayer M.D.  Rheumatology Department   [facility]

## 2022-10-06 ENCOUNTER — PATIENT MESSAGE (OUTPATIENT)
Dept: BARIATRICS | Facility: CLINIC | Age: 52
End: 2022-10-06
Payer: COMMERCIAL

## 2022-10-31 ENCOUNTER — PATIENT MESSAGE (OUTPATIENT)
Dept: OBSTETRICS AND GYNECOLOGY | Facility: CLINIC | Age: 52
End: 2022-10-31
Payer: COMMERCIAL

## 2022-11-14 ENCOUNTER — OFFICE VISIT (OUTPATIENT)
Dept: OBSTETRICS AND GYNECOLOGY | Facility: CLINIC | Age: 52
End: 2022-11-14
Payer: COMMERCIAL

## 2022-11-14 VITALS
BODY MASS INDEX: 47.02 KG/M2 | HEIGHT: 65 IN | WEIGHT: 282.19 LBS | DIASTOLIC BLOOD PRESSURE: 82 MMHG | SYSTOLIC BLOOD PRESSURE: 130 MMHG

## 2022-11-14 DIAGNOSIS — Z12.11 COLON CANCER SCREENING: ICD-10-CM

## 2022-11-14 DIAGNOSIS — N92.1 MENORRHAGIA WITH IRREGULAR CYCLE: Primary | ICD-10-CM

## 2022-11-14 PROCEDURE — 1160F PR REVIEW ALL MEDS BY PRESCRIBER/CLIN PHARMACIST DOCUMENTED: ICD-10-PCS | Mod: CPTII,S$GLB,, | Performed by: OBSTETRICS & GYNECOLOGY

## 2022-11-14 PROCEDURE — 87591 N.GONORRHOEAE DNA AMP PROB: CPT | Performed by: OBSTETRICS & GYNECOLOGY

## 2022-11-14 PROCEDURE — 99999 PR PBB SHADOW E&M-EST. PATIENT-LVL III: ICD-10-PCS | Mod: PBBFAC,,, | Performed by: OBSTETRICS & GYNECOLOGY

## 2022-11-14 PROCEDURE — 1159F MED LIST DOCD IN RCRD: CPT | Mod: CPTII,S$GLB,, | Performed by: OBSTETRICS & GYNECOLOGY

## 2022-11-14 PROCEDURE — 99213 OFFICE O/P EST LOW 20 MIN: CPT | Mod: S$GLB,,, | Performed by: OBSTETRICS & GYNECOLOGY

## 2022-11-14 PROCEDURE — 3079F DIAST BP 80-89 MM HG: CPT | Mod: CPTII,S$GLB,, | Performed by: OBSTETRICS & GYNECOLOGY

## 2022-11-14 PROCEDURE — 3075F PR MOST RECENT SYSTOLIC BLOOD PRESS GE 130-139MM HG: ICD-10-PCS | Mod: CPTII,S$GLB,, | Performed by: OBSTETRICS & GYNECOLOGY

## 2022-11-14 PROCEDURE — 87491 CHLMYD TRACH DNA AMP PROBE: CPT | Performed by: OBSTETRICS & GYNECOLOGY

## 2022-11-14 PROCEDURE — 99213 PR OFFICE/OUTPT VISIT, EST, LEVL III, 20-29 MIN: ICD-10-PCS | Mod: S$GLB,,, | Performed by: OBSTETRICS & GYNECOLOGY

## 2022-11-14 PROCEDURE — 1160F RVW MEDS BY RX/DR IN RCRD: CPT | Mod: CPTII,S$GLB,, | Performed by: OBSTETRICS & GYNECOLOGY

## 2022-11-14 PROCEDURE — 99999 PR PBB SHADOW E&M-EST. PATIENT-LVL III: CPT | Mod: PBBFAC,,, | Performed by: OBSTETRICS & GYNECOLOGY

## 2022-11-14 PROCEDURE — 3075F SYST BP GE 130 - 139MM HG: CPT | Mod: CPTII,S$GLB,, | Performed by: OBSTETRICS & GYNECOLOGY

## 2022-11-14 PROCEDURE — 3008F PR BODY MASS INDEX (BMI) DOCUMENTED: ICD-10-PCS | Mod: CPTII,S$GLB,, | Performed by: OBSTETRICS & GYNECOLOGY

## 2022-11-14 PROCEDURE — 1159F PR MEDICATION LIST DOCUMENTED IN MEDICAL RECORD: ICD-10-PCS | Mod: CPTII,S$GLB,, | Performed by: OBSTETRICS & GYNECOLOGY

## 2022-11-14 PROCEDURE — 3079F PR MOST RECENT DIASTOLIC BLOOD PRESSURE 80-89 MM HG: ICD-10-PCS | Mod: CPTII,S$GLB,, | Performed by: OBSTETRICS & GYNECOLOGY

## 2022-11-14 PROCEDURE — 3008F BODY MASS INDEX DOCD: CPT | Mod: CPTII,S$GLB,, | Performed by: OBSTETRICS & GYNECOLOGY

## 2022-11-14 NOTE — PROGRESS NOTES
Subjective:       Patient ID: Criss Link is a 52 y.o. female.    Chief Complaint:  Menstrual Problem (Pt with irregular bleeding pattern in October with 2 cycles on 10/03 - 10/08/2022 and then again on 10/22/2022 to 2022)      History of Present Illness  HPI  Patient comes in today complaining of having two cycles in October.  See staff notes above.  Bleeding was heavy but has stopped for the past 3 days.  Denies fever and chills.  No nausea or vomiting.  No pain    Previously treated for trichomonas vaginitis.  No longer with partner    Would like to get colon cancer screening.  But has not heard from our coordinator for her colonoscopy.    GYN & OB History  Patient's last menstrual period was 10/22/2022 (exact date).   Date of Last Pap: 2020    OB History    Para Term  AB Living   2 2 2     2   SAB IAB Ectopic Multiple Live Births           2      # Outcome Date GA Lbr Toney/2nd Weight Sex Delivery Anes PTL Lv   2 Term 04 39w0d  4.077 kg (8 lb 15.8 oz) F Vag-Spont EPI N ROBERT   1 Term 03 39w5d  3.799 kg (8 lb 6 oz) M Vag-Spont EPI N ROBERT      Complications: PIH (pregnancy induced hypertension), third trimester     Past Medical History:   Diagnosis Date    Allergies     Depression     Hypertension     Idiopathic polypoidal choroidal vasculopathy     Prediabetes        Past Surgical History:   Procedure Laterality Date    TUBAL LIGATION  2004    [facility]       Family History   Problem Relation Age of Onset    Heart disease Mother     Hypertension Father     Diabetes Father     Kidney cancer Father     Lymphoma Maternal Grandmother     Cancer Maternal Grandmother     Lymphoma Maternal Uncle        Social History     Socioeconomic History    Marital status:    Tobacco Use    Smoking status: Never    Smokeless tobacco: Never   Substance and Sexual Activity    Alcohol use: Yes     Comment: Socially    Drug use: Not Currently    Sexual activity: Not Currently     Partners:  Male     Birth control/protection: See Surgical Hx   Social History Narrative    Tobacco: None    EtOH: 2 mixed drinks 3 times per week     Drug: None    Employment: Works at  [employer] (Works alongside [employer])     Education: 11th grade      Lives with her mother and 2 teenage children    Partner: since 2021. He is a        Current Outpatient Medications   Medication Sig Dispense Refill    albuterol (PROVENTIL HFA) 90 mcg/actuation inhaler Inhale 2 puffs into the lungs every 6 (six) hours as needed (cough, wheezing, shortness of breath). Rescue 18 g 0    azelastine (ASTELIN) 137 mcg (0.1 %) nasal spray SPRAY 1 SPRAY (137 MCG TOTAL) BY NASAL ROUTE 2 (TWO) TIMES DAILY. 30 mL 5    chlorthalidone (HYGROTEN) 25 MG Tab TAKE 1 TABLET BY MOUTH EVERY DAY 90 tablet 1    dorzolamide (TRUSOPT) 2 % ophthalmic solution Place 1 drop into both eyes 2 (two) times daily.      meloxicam (MOBIC) 15 MG tablet Take 1 tablet (15 mg total) by mouth once daily. 30 tablet 6    potassium chloride SA (K-DUR,KLOR-CON) 10 MEQ tablet TAKE 2 TABLETS (20 MEQ TOTAL) BY MOUTH ONCE DAILY. 180 tablet 3    sertraline (ZOLOFT) 50 MG tablet Take 1 tablet (50 mg total) by mouth once daily. 90 tablet 3    terbinafine HCL (LAMISIL) 250 mg tablet Take 250 mg by mouth once daily.      TWYNEO 0.1-3 % Crea Apply topically.       No current facility-administered medications for this visit.       Review of patient's allergies indicates:  No Known Allergies      Review of Systems  Review of Systems   Constitutional:  Negative for activity change, appetite change, chills, fatigue, fever and unexpected weight change.   HENT:  Negative for mouth sores.    Respiratory:  Negative for cough, shortness of breath and wheezing.    Cardiovascular:  Negative for chest pain and palpitations.   Gastrointestinal:  Negative for abdominal pain, bloating, blood in stool, constipation, nausea and vomiting.   Endocrine: Negative for diabetes and hot flashes.    Genitourinary:  Positive for menorrhagia and menstrual problem. Negative for dysmenorrhea, dyspareunia, dysuria, frequency, hematuria, pelvic pain, urgency, vaginal bleeding, vaginal discharge, vaginal pain, urinary incontinence, postcoital bleeding and vaginal odor.   Musculoskeletal:  Negative for back pain and myalgias.   Integumentary:  Negative for rash, breast mass and nipple discharge.   Neurological:  Negative for seizures and headaches.   Psychiatric/Behavioral:  Negative for depression and sleep disturbance. The patient is not nervous/anxious.    Breast: Negative for mass, mastodynia and nipple discharge        Objective:    Physical Exam:   Constitutional: She appears well-developed and well-nourished. No distress.   BMI of 46.96    HENT:   Head: Normocephalic and atraumatic.    Eyes: EOM are normal.      Pulmonary/Chest: Effort normal. No respiratory distress.   Breasts: Non-tender, no engorgement, no masses, no retraction, no discharge. Negative for lymphadenopathy.         Abdominal: Soft. She exhibits no distension. There is no abdominal tenderness. There is no rebound and no guarding.     Genitourinary:    Vagina and uterus normal.   No  no vaginal discharge in the vagina.    Genitourinary Comments: Vulva without any obvious lesions.  Urethral meatus normal size and location without any lesion.  Urethra is non-tender without stricture or discharge.  Bladder is non-tender.  Vaginal vault with good support.  Minimal white discharge noted.  No obvious lesion.  Normal rugation.  Cervix is somewhat friable without any cervical motion tenderness.  No obvious lesion.  Uterus is about 10-12 weeks, non-tender, normal contour.  Adnexa is without any masses or tenderness.  Perineum without obvious lesion.               Musculoskeletal: Normal range of motion.       Neurological: She is alert.    Skin: Skin is warm and dry.    Psychiatric: She has a normal mood and affect.        Assessment:        1.  Menorrhagia with irregular cycle    2. Colon cancer screening              Plan:      I have discussed with the patient regarding her condition.  GC, CT  Pelvic ultrasound  Back for endometrial biopsy, mitch if thickened endometrium or repeat heavy bleeding    ColoGuard ordered per request

## 2022-11-15 LAB
C TRACH DNA SPEC QL NAA+PROBE: NOT DETECTED
N GONORRHOEA DNA SPEC QL NAA+PROBE: NOT DETECTED

## 2022-11-17 DIAGNOSIS — Z12.11 ENCOUNTER FOR SCREENING COLONOSCOPY FOR NON-HIGH-RISK PATIENT: Primary | ICD-10-CM

## 2022-12-08 ENCOUNTER — TELEPHONE (OUTPATIENT)
Dept: PRIMARY CARE CLINIC | Facility: CLINIC | Age: 52
End: 2022-12-08
Payer: COMMERCIAL

## 2022-12-08 NOTE — TELEPHONE ENCOUNTER
----- Message from Gary Deal sent at 12/8/2022  3:13 PM CST -----  Contact: pt  .Type:  Needs Medical Advice    Who Called: pt  Would the patient rather a call back or a response via [facility]? Call back  Best Call Back Number: [phone]  Additional Information: pt. Is requesting a call back regarding a referral for a new PCP that the provider may can recommend.

## 2023-01-12 ENCOUNTER — PATIENT OUTREACH (OUTPATIENT)
Dept: ADMINISTRATIVE | Facility: HOSPITAL | Age: 53
End: 2023-01-12
Payer: COMMERCIAL

## 2023-01-25 PROBLEM — R09.81 NASAL CONGESTION: Status: RESOLVED | Noted: 2021-04-08 | Resolved: 2023-01-25

## 2023-01-25 PROBLEM — F41.1 GENERALIZED ANXIETY DISORDER: Status: ACTIVE | Noted: 2023-01-25

## 2023-01-25 PROBLEM — F33.42 RECURRENT MAJOR DEPRESSIVE DISORDER, IN FULL REMISSION: Status: ACTIVE | Noted: 2023-01-25

## 2023-01-25 RX ORDER — ZOSTER VACCINE RECOMBINANT, ADJUVANTED 50 MCG/0.5
KIT INTRAMUSCULAR
Qty: 1 EACH | Refills: 1 | Status: CANCELLED | OUTPATIENT
Start: 2023-01-25

## 2023-01-25 NOTE — PROGRESS NOTES
FAMILY MEDICINE  [facility]    Reason for visit:   Chief Complaint   Patient presents with    Annual Exam         SUBJECTIVE: Criss Link is a 52 y.o. female  - with obesity, hypertension, prediabetes, hyperlipidemia, anxiety, depression and CATE presents as a new patient to establish care and refill her medications. Last PCP Dr. Charlie Sanon.     Gynecology Dr. Buddy Shaffer MD  Rheumatology Dr. Raciel Thayer MD  - for chronic CATE  Colon cancer screening: She reports that she does have her Cologuard at home.  She reports that she will completed it.     Today she also reports concerns for recent exposure to Trichomonas.  She reports that her recent partner notified her that he was diagnosed and treated  for Trichomonas.  She denies any symptoms.  She would also like other STDs be screening.      1. Hypertension  - Obesity  - BP goal < 140/90    Current medication treatment:   chlorthalidone (HYGROTEN) 25 MG Tab, TAKE 1 TABLET BY MOUTH EVERY DAY, Disp: 90 tablet, Rfl: 1  potassium chloride SA (K-DUR,KLOR-CON) 10 MEQ tablet, TAKE 2 TABLETS (20 MEQ TOTAL) BY MOUTH ONCE DAILY    Medication side effects:  Recurrent hypokalemia and currently on potassium chloride     Exercise regimen: not active    Home BP cuff: No  How often does patient monitoring BP? NA      BMP  Lab Results       Component                Value               Date                       NA                       140                 11/12/2021                 K                        4.0                 11/12/2021                 CL                       103                 11/12/2021                 CO2                      30 (H)              11/12/2021                 BUN                      11                  11/12/2021                 CREATININE               0.8                 11/12/2021                 CALCIUM                  10.1                11/12/2021                 ANIONGAP                 7 (L)                11/12/2021              2. Depression and Anxiety    Age diagnosed: 50 yo     Today 1/26/23:  She reports that she was initially diagnosed with depression anxiety last year.  She reports she has increased stressors.  She lives with her mother and father.  She reports that her mother suffers from alcoholism.  She also has 2 teenage children.  She reports her daughter has been causing her some increased stress recently.  She works at a job that is also high stress and demands concentration.  She is a  at the [employer].  She reports initially when she started sertraline that it was effective however she reports the last several months she has had difficulty focusing.  She has had some increased anxiety and has been more down.  She denies any thoughts of self-harm.    Prior notes: NA    Panic attacks: denies  Hopelessness:  denies  Sleep issues:  denies  Suicidal thoughts:  denies  Thoughts of self harm: denies  Thoughts of harm to others:  denies  History of suicide attempts:  denies  Family history of suicide: denies    Psychiatrist: none  Psychologist: none  Counselor : none    Prior medication: denies    Current medications:   sertraline (ZOLOFT) 50 MG tablet, Take 1 tablet (50 mg total) by mouth once daily., Disp: 90 tablet, Rfl: 3    Side effects of current treatment: not effective    Support system: family          Review of Systems   All other systems reviewed and are negative.    HEALTH MAINTENANCE:   Health Maintenance   Topic Date Due    Mammogram  06/16/2024    Lipid Panel  10/06/2026    TETANUS VACCINE  01/30/2030    Hepatitis C Screening  Completed     Health Maintenance Topics with due status: Not Due       Topic Last Completion Date    TETANUS VACCINE 01/30/2020    Cervical Cancer Screening 01/30/2020    Lipid Panel 10/06/2021    Mammogram 06/16/2022     Health Maintenance Due   Topic Date Due    COVID-19 Vaccine (3 - Booster for Pfizer series) 06/15/2021    Shingles Vaccine (1 of 2)  Never done    Influenza Vaccine (1) 09/01/2022    Hemoglobin A1c (Prediabetes)  10/06/2022       HISTORY:   Past Medical History:   Diagnosis Date    Allergies     Depression     Hypertension     Idiopathic polypoidal choroidal vasculopathy     Prediabetes        Past Surgical History:   Procedure Laterality Date    TUBAL LIGATION  12/30/2004    [facility]       Family History   Problem Relation Age of Onset    Heart disease Mother     Depression Mother     Hypertension Father     Diabetes Father     Kidney cancer Father     Cancer Father     No Known Problems Daughter     No Known Problems Son     Lymphoma Maternal Uncle     Lymphoma Maternal Grandmother     Cancer Maternal Grandmother        Social History     Tobacco Use    Smoking status: Never     Passive exposure: Never    Smokeless tobacco: Never   Substance Use Topics    Alcohol use: Yes     Alcohol/week: 2.0 standard drinks     Types: 2 Drinks containing 0.5 oz of alcohol per week     Comment: Socially    Drug use: Never       Social History     Social History Narrative    . 1 son and 1 daughter (2023 daughter 19 yo). Mother and father live with her. Mother suffers from alcoholism. Sexually active    Tobacco: None    EtOH: 2 mixed drinks 3 times per week     Drug: None    Employment: Works at  [employer] (Works alongside [employer])     Education: 11th grade      Lives with her mother and 2 teenage children       ALLERGIES:   Review of patient's allergies indicates:  No Known Allergies    MEDS:     Current Outpatient Medications:     azelastine (ASTELIN) 137 mcg (0.1 %) nasal spray, SPRAY 1 SPRAY (137 MCG TOTAL) BY NASAL ROUTE 2 (TWO) TIMES DAILY., Disp: 30 mL, Rfl: 5    dorzolamide (TRUSOPT) 2 % ophthalmic solution, Place 1 drop into both eyes 2 (two) times daily., Disp: , Rfl:     meloxicam (MOBIC) 15 MG tablet, Take 1 tablet (15 mg total) by mouth once daily., Disp: 30 tablet, Rfl: 6    metroNIDAZOLE (FLAGYL) 500 MG tablet, Take 4 tablets  "(2,000 mg total) by mouth once. for 1 dose, Disp: 4 tablet, Rfl: 0    sertraline (ZOLOFT) 100 MG tablet, Take 1 tablet (100 mg total) by mouth once daily., Disp: 90 tablet, Rfl: 0    spironolactone (ALDACTONE) 50 MG tablet, Take 1 tablet (50 mg total) by mouth once daily., Disp: 90 tablet, Rfl: 0    TWYNEO 0.1-3 % Crea, Apply topically., Disp: , Rfl:       Vital signs:   Vitals:    01/26/23 1029 01/26/23 1055   BP: (!) 140/78 132/70   Pulse: 92    SpO2: 99%    Weight: 126.8 kg (279 lb 6.9 oz)    Height: 5' 5" (1.651 m)      Body mass index is 46.5 kg/m².    PHYSICAL EXAM:     Physical Exam  Vitals reviewed.   Constitutional:       General: She is not in acute distress.  HENT:      Head: Normocephalic and atraumatic.      Right Ear: Tympanic membrane and ear canal normal.      Left Ear: Tympanic membrane and ear canal normal.   Eyes:      General: No scleral icterus.     Conjunctiva/sclera: Conjunctivae normal.   Neck:      Thyroid: No thyromegaly.   Cardiovascular:      Rate and Rhythm: Normal rate and regular rhythm.      Pulses: Normal pulses.      Heart sounds: Normal heart sounds. No murmur heard.    No friction rub. No gallop.   Pulmonary:      Effort: Pulmonary effort is normal.      Breath sounds: Normal breath sounds. No wheezing, rhonchi or rales.   Abdominal:      General: Bowel sounds are normal. There is no distension.      Palpations: Abdomen is soft.      Tenderness: There is no abdominal tenderness.   Musculoskeletal:      Cervical back: Normal range of motion and neck supple.      Right lower leg: No edema.      Left lower leg: No edema.   Lymphadenopathy:      Cervical: No cervical adenopathy.   Skin:     General: Skin is warm.      Capillary Refill: Capillary refill takes less than 2 seconds.      Comments: Increased Facial hair noted on chin and neck   Neurological:      Mental Status: She is alert.           PERTINENT RESULTS:   [facility]  Lab Results   Component Value Date     11/12/2021    K " 4.0 11/12/2021     11/12/2021    CO2 30 (H) 11/12/2021    BUN 11 11/12/2021    CREATININE 0.8 11/12/2021    CALCIUM 10.1 11/12/2021    ANIONGAP 7 (L) 11/12/2021     Lab Results   Component Value Date    ALT 18 11/12/2021    AST 18 11/12/2021    ALKPHOS 67 11/12/2021    BILITOT 0.3 11/12/2021     Lab Results   Component Value Date    CHOL 200 (H) 10/06/2021    CHOL 219 (H) 02/28/2020     Lab Results   Component Value Date    HDL 88 (H) 10/06/2021    HDL 95 (H) 02/28/2020     Lab Results   Component Value Date    LDLCALC 101.0 10/06/2021    LDLCALC 112.4 02/28/2020     Lab Results   Component Value Date    TRIG 55 10/06/2021    TRIG 58 02/28/2020     Lab Results   Component Value Date    CHOLHDL 44.0 10/06/2021    CHOLHDL 43.4 02/28/2020     Lab Results   Component Value Date    HGBA1C 5.9 (H) 10/06/2021       ASSESSMENT/PLAN:    1. Essential hypertension  Overview:  - well controlled however has recurrent hypokalemia secondary to chlorthalidone   -discussed options with patient to continue chlorthalidone with potassium supplementation verses trialing a potassium-sparing diuretic  -she opted to try any medication   -recommend discontinue chlorthalidone and potassium chloride   -recommend start spironolactone 50 mg daily and titrated as tolerated  - counseling regarding new medication including expected results, potential side effects, and appropriate use.  - questions elicited and answered  - encouraged to patient to notify me of any questions or concerns    Orders:  -     Comprehensive Metabolic Panel; Future; Expected date: 01/26/2023  -     Lipid Panel; Future; Expected date: 01/26/2023  -     spironolactone (ALDACTONE) 50 MG tablet; Take 1 tablet (50 mg total) by mouth once daily.  Dispense: 90 tablet; Refill: 0    2. Mild episode of recurrent major depressive disorder  Overview:  - patient noticing some worsening symptoms of her anxiety and depression   -recommend trial increase sertraline 50 mg to 100 mg  daily   - questions elicited and answered  - encouraged to patient to notify me of any questions or concerns  - follow-up 1 month    Orders:  -     sertraline (ZOLOFT) 100 MG tablet; Take 1 tablet (100 mg total) by mouth once daily.  Dispense: 90 tablet; Refill: 0    3. Hypokalemia  Comments:  Repeat K today. Continue KCl 20 mEq daily for now  Overview:  Lab Results   Component Value Date    K 4.0 11/12/2021     - likely secondary to chlorthalidone and on potassium supplementation   -see hypertension    Orders:  -     spironolactone (ALDACTONE) 50 MG tablet; Take 1 tablet (50 mg total) by mouth once daily.  Dispense: 90 tablet; Refill: 0    4. Hirsutism  Overview:  - recommend trial spironolactone 50 mg daily and up titrate as tolerated    Orders:  -     spironolactone (ALDACTONE) 50 MG tablet; Take 1 tablet (50 mg total) by mouth once daily.  Dispense: 90 tablet; Refill: 0    5. Generalized anxiety disorder  Overview:  See depression    Orders:  -     sertraline (ZOLOFT) 100 MG tablet; Take 1 tablet (100 mg total) by mouth once daily.  Dispense: 90 tablet; Refill: 0    6. Trichimoniasis  Overview:  - recent exposure   -patient currently on her menses   - recommend treat empirically with metronidazole 2 g p.o. once     Orders:  -     metroNIDAZOLE (FLAGYL) 500 MG tablet; Take 4 tablets (2,000 mg total) by mouth once. for 1 dose  Dispense: 4 tablet; Refill: 0    7. Normocytic anemia  Overview:  Lab Results   Component Value Date    HGB 9.9 (L) 11/12/2021     Lab Results   Component Value Date    HCT 32.6 (L) 11/12/2021     - she reports a possible history of sickle cell trait    Orders:  -     Ferritin; Future; Expected date: 01/26/2023  -     Iron and TIBC; Future; Expected date: 01/26/2023  -     CBC Auto Differential; Future; Expected date: 01/26/2023  -     Hemoglobin Electrophoresis [place], Blood; Future; Expected date: 01/26/2023    8. Prediabetes  Overview:  Lab Results   Component Value Date    HGBA1C 5.9  (H) 10/06/2021     - discussed recommendation for diet and cardiovascular exercise  - counseling on lifestyle modifications for risk factor reduction  - counseling on management options    Orders:  -     Hemoglobin A1C; Future; Expected date: 01/26/2023    9. CATE (obstructive sleep apnea)  Overview:  - 4/23/21 Sleep study: AHI of 5.  - AutoPAP 5-15 cmH2O      10. Class 3 severe obesity due to excess calories with serious comorbidity and body mass index (BMI) of 45.0 to 49.9 in adult  Overview:  - discussed recommendation for diet and cardiovascular exercise  - counseling on lifestyle modifications for risk factor reduction  - counseling on management options      11. Vitamin D deficiency  Overview:  Vitamin D 11 2/2020. Not treated. Will repeat today.     Orders:  -     Vitamin D; Future; Expected date: 01/26/2023    12. Screen for STD (sexually transmitted disease)  -     HIV 1/2 Ag/Ab (4th Gen); Future; Expected date: 01/26/2023  -     RPR; Future; Expected date: 01/26/2023  -     C. trachomatis/N. gonorrhoeae by AMP DNA [facility]; Urine; Future; Expected date: 01/26/2023    13. Screening for HIV (human immunodeficiency virus)  -     HIV 1/2 Ag/Ab (4th Gen); Future; Expected date: 01/26/2023          ORDERS:   Orders Placed This Encounter    C. trachomatis/N. gonorrhoeae by AMP DNA [facility]; Urine    Comprehensive Metabolic Panel    Lipid Panel    Ferritin    Iron and TIBC    Hemoglobin A1C    Vitamin D    CBC Auto Differential    HIV 1/2 Ag/Ab (4th Gen)    RPR    Hemoglobin Electrophoresis [place], Blood    metroNIDAZOLE (FLAGYL) 500 MG tablet    sertraline (ZOLOFT) 100 MG tablet    spironolactone (ALDACTONE) 50 MG tablet       Vaccines recommended: flu, shingles, covid-19     Follow-up in 1 month BP, anxiety or sooner if any concerns.      This note is dictated using the M*Modal Fluency Direct word recognition program. There are word recognition mistakes that are occasionally missed on review.    Dr. Leanna Galeano D.O.    Family Medicine

## 2023-01-26 ENCOUNTER — OFFICE VISIT (OUTPATIENT)
Dept: PRIMARY CARE CLINIC | Facility: CLINIC | Age: 53
End: 2023-01-26
Attending: FAMILY MEDICINE
Payer: COMMERCIAL

## 2023-01-26 VITALS
BODY MASS INDEX: 46.56 KG/M2 | SYSTOLIC BLOOD PRESSURE: 132 MMHG | HEART RATE: 92 BPM | DIASTOLIC BLOOD PRESSURE: 70 MMHG | OXYGEN SATURATION: 99 % | WEIGHT: 279.44 LBS | HEIGHT: 65 IN

## 2023-01-26 DIAGNOSIS — G47.33 OSA (OBSTRUCTIVE SLEEP APNEA): ICD-10-CM

## 2023-01-26 DIAGNOSIS — F33.0 MILD EPISODE OF RECURRENT MAJOR DEPRESSIVE DISORDER: ICD-10-CM

## 2023-01-26 DIAGNOSIS — R73.03 PREDIABETES: ICD-10-CM

## 2023-01-26 DIAGNOSIS — F41.1 GENERALIZED ANXIETY DISORDER: ICD-10-CM

## 2023-01-26 DIAGNOSIS — A59.9 TRICHIMONIASIS: ICD-10-CM

## 2023-01-26 DIAGNOSIS — E55.9 VITAMIN D DEFICIENCY: ICD-10-CM

## 2023-01-26 DIAGNOSIS — D64.9 NORMOCYTIC ANEMIA: ICD-10-CM

## 2023-01-26 DIAGNOSIS — Z11.4 SCREENING FOR HIV (HUMAN IMMUNODEFICIENCY VIRUS): ICD-10-CM

## 2023-01-26 DIAGNOSIS — E87.6 HYPOKALEMIA: ICD-10-CM

## 2023-01-26 DIAGNOSIS — Z11.3 SCREEN FOR STD (SEXUALLY TRANSMITTED DISEASE): ICD-10-CM

## 2023-01-26 DIAGNOSIS — E66.01 CLASS 3 SEVERE OBESITY DUE TO EXCESS CALORIES WITH SERIOUS COMORBIDITY AND BODY MASS INDEX (BMI) OF 45.0 TO 49.9 IN ADULT: ICD-10-CM

## 2023-01-26 DIAGNOSIS — I10 ESSENTIAL HYPERTENSION: Primary | ICD-10-CM

## 2023-01-26 DIAGNOSIS — L68.0 HIRSUTISM: ICD-10-CM

## 2023-01-26 PROBLEM — R06.83 SNORING: Status: RESOLVED | Noted: 2021-02-26 | Resolved: 2023-01-26

## 2023-01-26 PROCEDURE — 99999 PR PBB SHADOW E&M-EST. PATIENT-LVL IV: CPT | Mod: PBBFAC,,, | Performed by: FAMILY MEDICINE

## 2023-01-26 PROCEDURE — 99214 PR OFFICE/OUTPT VISIT, EST, LEVL IV, 30-39 MIN: ICD-10-PCS | Mod: S$GLB,,, | Performed by: FAMILY MEDICINE

## 2023-01-26 PROCEDURE — 1160F RVW MEDS BY RX/DR IN RCRD: CPT | Mod: CPTII,S$GLB,, | Performed by: FAMILY MEDICINE

## 2023-01-26 PROCEDURE — 3075F PR MOST RECENT SYSTOLIC BLOOD PRESS GE 130-139MM HG: ICD-10-PCS | Mod: CPTII,S$GLB,, | Performed by: FAMILY MEDICINE

## 2023-01-26 PROCEDURE — 3078F DIAST BP <80 MM HG: CPT | Mod: CPTII,S$GLB,, | Performed by: FAMILY MEDICINE

## 2023-01-26 PROCEDURE — 1160F PR REVIEW ALL MEDS BY PRESCRIBER/CLIN PHARMACIST DOCUMENTED: ICD-10-PCS | Mod: CPTII,S$GLB,, | Performed by: FAMILY MEDICINE

## 2023-01-26 PROCEDURE — 99999 PR PBB SHADOW E&M-EST. PATIENT-LVL IV: ICD-10-PCS | Mod: PBBFAC,,, | Performed by: FAMILY MEDICINE

## 2023-01-26 PROCEDURE — 3078F PR MOST RECENT DIASTOLIC BLOOD PRESSURE < 80 MM HG: ICD-10-PCS | Mod: CPTII,S$GLB,, | Performed by: FAMILY MEDICINE

## 2023-01-26 PROCEDURE — 99214 OFFICE O/P EST MOD 30 MIN: CPT | Mod: S$GLB,,, | Performed by: FAMILY MEDICINE

## 2023-01-26 PROCEDURE — 3008F BODY MASS INDEX DOCD: CPT | Mod: CPTII,S$GLB,, | Performed by: FAMILY MEDICINE

## 2023-01-26 PROCEDURE — 1159F MED LIST DOCD IN RCRD: CPT | Mod: CPTII,S$GLB,, | Performed by: FAMILY MEDICINE

## 2023-01-26 PROCEDURE — 3008F PR BODY MASS INDEX (BMI) DOCUMENTED: ICD-10-PCS | Mod: CPTII,S$GLB,, | Performed by: FAMILY MEDICINE

## 2023-01-26 PROCEDURE — 1159F PR MEDICATION LIST DOCUMENTED IN MEDICAL RECORD: ICD-10-PCS | Mod: CPTII,S$GLB,, | Performed by: FAMILY MEDICINE

## 2023-01-26 PROCEDURE — 3075F SYST BP GE 130 - 139MM HG: CPT | Mod: CPTII,S$GLB,, | Performed by: FAMILY MEDICINE

## 2023-01-26 RX ORDER — SPIRONOLACTONE 50 MG/1
50 TABLET, FILM COATED ORAL DAILY
Qty: 90 TABLET | Refills: 0 | Status: SHIPPED | OUTPATIENT
Start: 2023-01-26 | End: 2023-03-30 | Stop reason: SDUPTHER

## 2023-01-26 RX ORDER — SERTRALINE HYDROCHLORIDE 100 MG/1
100 TABLET, FILM COATED ORAL DAILY
Qty: 90 TABLET | Refills: 0 | Status: SHIPPED | OUTPATIENT
Start: 2023-01-26 | End: 2023-03-30 | Stop reason: SDUPTHER

## 2023-01-26 RX ORDER — METRONIDAZOLE 500 MG/1
2000 TABLET ORAL ONCE
Qty: 4 TABLET | Refills: 0 | Status: SHIPPED | OUTPATIENT
Start: 2023-01-26 | End: 2023-01-26

## 2023-01-27 ENCOUNTER — PATIENT MESSAGE (OUTPATIENT)
Dept: PRIMARY CARE CLINIC | Facility: CLINIC | Age: 53
End: 2023-01-27
Payer: COMMERCIAL

## 2023-01-30 ENCOUNTER — LAB VISIT (OUTPATIENT)
Dept: LAB | Facility: HOSPITAL | Age: 53
End: 2023-01-30
Attending: FAMILY MEDICINE
Payer: COMMERCIAL

## 2023-01-30 DIAGNOSIS — Z11.3 SCREEN FOR STD (SEXUALLY TRANSMITTED DISEASE): ICD-10-CM

## 2023-01-30 PROCEDURE — 87591 N.GONORRHOEAE DNA AMP PROB: CPT | Performed by: FAMILY MEDICINE

## 2023-01-31 ENCOUNTER — PATIENT MESSAGE (OUTPATIENT)
Dept: PRIMARY CARE CLINIC | Facility: CLINIC | Age: 53
End: 2023-01-31
Payer: COMMERCIAL

## 2023-01-31 LAB
C TRACH DNA SPEC QL NAA+PROBE: NOT DETECTED
N GONORRHOEA DNA SPEC QL NAA+PROBE: NOT DETECTED

## 2023-01-31 NOTE — PROGRESS NOTES
FAMILY MEDICINE  [facility]    Reason for visit:   Chief Complaint   Patient presents with    Sore Throat    Generalized Body Aches       HPI: Criss Link is a 52 y.o. female  - with obesity, hypertension, prediabetes, hyperlipidemia, anxiety, depression and CATE presents with a sore throat    Gynecology Dr. Buddy Shaffer MD  Rheumatology Dr. Raciel Thayer MD  - for chronic OA  Colon cancer screening: She reports that she does have her Cologuard at home.  She reports that she will completed it.    1. Sore throat    Onset: 1/28/23  Location: throat   Duration: constant  Character: scratchy and sore with difficulty swallowing  Aggravating factors: swallowing   Relieving factors: NSAIDs OTC   Timing of day: all day  Associated symptoms: congestion, postnasal drip, muscle aches  Negative symptoms: denies shortness of breath, cough, chest pain or fever          Review of Systems   Constitutional:  Positive for malaise/fatigue. Negative for fever.   HENT:  Positive for congestion and sore throat.    Respiratory:  Negative for cough, sputum production and shortness of breath.    Cardiovascular:  Negative for chest pain.   Musculoskeletal:  Positive for myalgias.   All other systems reviewed and are negative.    Social History     Social History Narrative    . 1 son and 1 daughter (2023 daughter 17 yo). Mother and father live with her. Mother suffers from alcoholism. Sexually active    Tobacco: None    EtOH: 2 mixed drinks 3 times per week     Drug: None    Employment: Works at  [employer] (Works alongside [employer])     Education: 11th grade      Lives with her mother and 2 teenage children         ALLERGIES:   Review of patient's allergies indicates:  No Known Allergies    MEDS:     Current Outpatient Medications:     azelastine (ASTELIN) 137 mcg (0.1 %) nasal spray, SPRAY 1 SPRAY (137 MCG TOTAL) BY NASAL ROUTE 2 (TWO) TIMES DAILY., Disp: 30 mL, Rfl: 5    dorzolamide  "(TRUSOPT) 2 % ophthalmic solution, Place 1 drop into both eyes 2 (two) times daily., Disp: , Rfl:     meloxicam (MOBIC) 15 MG tablet, Take 1 tablet (15 mg total) by mouth once daily., Disp: 30 tablet, Rfl: 6    sertraline (ZOLOFT) 100 MG tablet, Take 1 tablet (100 mg total) by mouth once daily., Disp: 90 tablet, Rfl: 0    spironolactone (ALDACTONE) 50 MG tablet, Take 1 tablet (50 mg total) by mouth once daily., Disp: 90 tablet, Rfl: 0    TWYNEO 0.1-3 % Crea, Apply topically., Disp: , Rfl:     cholecalciferol, vitamin D3, (VITAMIN D3) 50 mcg (2,000 unit) Tab, Take 1 tablet (2,000 Units total) by mouth once daily., Disp: 90 tablet, Rfl: 3    metroNIDAZOLE (FLAGYL) 500 MG tablet, Take 2,000 mg by mouth once., Disp: , Rfl:     Vital signs:   Vitals:    02/01/23 1302   BP: 138/88   BP Location: Right arm   Patient Position: Sitting   BP Method: Large (Manual)   Pulse: 67   Temp: 98.5 °F (36.9 °C)   SpO2: 98%   Weight: 128.3 kg (282 lb 13.6 oz)   Height: 5' 5" (1.651 m)     Body mass index is 47.07 kg/m².    PHYSICAL EXAM:     Physical Exam  Constitutional:       General: She is not in acute distress.  HENT:      Nose: Rhinorrhea present. Rhinorrhea is clear.      Right Sinus: No maxillary sinus tenderness or frontal sinus tenderness.      Left Sinus: No maxillary sinus tenderness or frontal sinus tenderness.   Cardiovascular:      Rate and Rhythm: Normal rate and regular rhythm.      Heart sounds: Normal heart sounds. No murmur heard.    No friction rub. No gallop.   Pulmonary:      Effort: Pulmonary effort is normal. No respiratory distress.      Breath sounds: Normal breath sounds. No decreased breath sounds, wheezing, rhonchi or rales.   Musculoskeletal:      Cervical back: Neck supple.      Right lower leg: No edema.      Left lower leg: No edema.   Neurological:      Mental Status: She is alert.   Psychiatric:         Speech: Speech normal.         PERTINENT RESULTS:   Office Visit on 02/01/2023   Component Date " Value Ref Range Status    Molecular Strep A, POC 02/01/2023 Negative  Negative Final     Acceptable 02/01/2023 Yes   Final    SARS Coronavirus 2 Antigen 02/01/2023 Positive (A)  Negative Final     Acceptable 02/01/2023 Yes   Final   Lab Visit on 01/30/2023   Component Date Value Ref Range Status    Sodium 01/30/2023 140  136 - 145 mmol/L Final    Potassium 01/30/2023 3.6  3.5 - 5.1 mmol/L Final    Chloride 01/30/2023 102  95 - 110 mmol/L Final    CO2 01/30/2023 25  23 - 29 mmol/L Final    Glucose 01/30/2023 99  70 - 110 mg/dL Final    BUN 01/30/2023 12  6 - 20 mg/dL Final    Creatinine 01/30/2023 0.8  0.5 - 1.4 mg/dL Final    Calcium 01/30/2023 8.8  8.7 - 10.5 mg/dL Final    Total Protein 01/30/2023 7.0  6.0 - 8.4 g/dL Final    Albumin 01/30/2023 3.4 (L)  3.5 - 5.2 g/dL Final    Total Bilirubin 01/30/2023 0.3  0.1 - 1.0 mg/dL Final    Comment: For infants and newborns, interpretation of results should be based  on gestational age, weight and in agreement with clinical  observations.    Premature Infant recommended reference ranges:  Up to 24 hours.............<8.0 mg/dL  Up to 48 hours............<12.0 mg/dL  3-5 days..................<15.0 mg/dL  6-29 days.................<15.0 mg/dL      Alkaline Phosphatase 01/30/2023 72  55 - 135 U/L Final    AST 01/30/2023 14  10 - 40 U/L Final    ALT 01/30/2023 13  10 - 44 U/L Final    Anion Gap 01/30/2023 13  8 - 16 mmol/L Final    eGFR 01/30/2023 >60.0  >60 mL/min/1.73 m^2 Final    Cholesterol 01/30/2023 233 (H)  120 - 199 mg/dL Final    Comment: The National Cholesterol Education Program (NCEP) has set the  following guidelines (reference ranges) for Cholesterol:  Optimal.....................<200 mg/dL  Borderline High.............200-239 mg/dL  High........................> or = 240 mg/dL      Triglycerides 01/30/2023 96  30 - 150 mg/dL Final    Comment: The National Cholesterol Education Program (NCEP) has set the  following guidelines  (reference values) for triglycerides:  Normal......................<150 mg/dL  Borderline High.............150-199 mg/dL  High........................200-499 mg/dL      HDL 01/30/2023 98 (H)  40 - 75 mg/dL Final    Comment: The National Cholesterol Education Program (NCEP) has set the  following guidelines (reference values) for HDL Cholesterol:  Low...............<40 mg/dL  Optimal...........>60 mg/dL      LDL Cholesterol 01/30/2023 115.8  63.0 - 159.0 mg/dL Final    Comment: The National Cholesterol Education Program (NCEP) has set the  following guidelines (reference values) for LDL Cholesterol:  Optimal.......................<130 mg/dL  Borderline High...............130-159 mg/dL  High..........................160-189 mg/dL  Very High.....................>190 mg/dL      HDL/Cholesterol Ratio 01/30/2023 42.1  20.0 - 50.0 % Final    Total Cholesterol/HDL Ratio 01/30/2023 2.4  2.0 - 5.0 Final    Non-HDL Cholesterol 01/30/2023 135  mg/dL Final    Comment: Risk category and Non-HDL cholesterol goals:  Coronary heart disease (CHD)or equivalent (10-year risk of CHD >20%):  Non-HDL cholesterol goal     <130 mg/dL  Two or more CHD risk factors and 10-year risk of CHD <= 20%:  Non-HDL cholesterol goal     <160 mg/dL  0 to 1 CHD risk factor:  Non-HDL cholesterol goal     <190 mg/dL      Ferritin 01/30/2023 21  20.0 - 300.0 ng/mL Final    Iron 01/30/2023 36  30 - 160 ug/dL Final    Transferrin 01/30/2023 279  200 - 375 mg/dL Final    TIBC 01/30/2023 413  250 - 450 ug/dL Final    Saturated Iron 01/30/2023 9 (L)  20 - 50 % Final    Hemoglobin A1C 01/30/2023 5.9 (H)  4.0 - 5.6 % Final    Comment: ADA Screening Guidelines:  5.7-6.4%  Consistent with prediabetes  >or=6.5%  Consistent with diabetes    High levels of fetal hemoglobin interfere with the HbA1C  assay. Heterozygous hemoglobin variants (HbS, HgC, etc)do  not significantly interfere with this assay.   However, presence of multiple variants may affect accuracy.       Estimated Avg Glucose 01/30/2023 123  68 - 131 mg/dL Final    Vit D, 25-Hydroxy 01/30/2023 15 (L)  30 - 96 ng/mL Final    Comment: Vitamin D deficiency.........<10 ng/mL                              Vitamin D insufficiency......10-29 ng/mL       Vitamin D sufficiency........> or equal to 30 ng/mL  Vitamin D toxicity............>100 ng/mL      WBC 01/30/2023 8.46  3.90 - 12.70 K/uL Final    RBC 01/30/2023 4.22  4.00 - 5.40 M/uL Final    Hemoglobin 01/30/2023 11.2 (L)  12.0 - 16.0 g/dL Final    Hematocrit 01/30/2023 36.6 (L)  37.0 - 48.5 % Final    MCV 01/30/2023 87  82 - 98 fL Final    MCH 01/30/2023 26.5 (L)  27.0 - 31.0 pg Final    MCHC 01/30/2023 30.6 (L)  32.0 - 36.0 g/dL Final    RDW 01/30/2023 15.5 (H)  11.5 - 14.5 % Final    Platelets 01/30/2023 424  150 - 450 K/uL Final    MPV 01/30/2023 11.0  9.2 - 12.9 fL Final    Immature Granulocytes 01/30/2023 0.2  0.0 - 0.5 % Final    Gran # (ANC) 01/30/2023 6.3  1.8 - 7.7 K/uL Final    Immature Grans (Abs) 01/30/2023 0.02  0.00 - 0.04 K/uL Final    Comment: Mild elevation in immature granulocytes is non specific and   can be seen in a variety of conditions including stress response,   acute inflammation, trauma and pregnancy. Correlation with other   laboratory and clinical findings is essential.      Lymph # 01/30/2023 1.4  1.0 - 4.8 K/uL Final    Mono # 01/30/2023 0.6  0.3 - 1.0 K/uL Final    Eos # 01/30/2023 0.1  0.0 - 0.5 K/uL Final    Baso # 01/30/2023 0.03  0.00 - 0.20 K/uL Final    nRBC 01/30/2023 0  0 /100 WBC Final    Gran % 01/30/2023 74.6 (H)  38.0 - 73.0 % Final    Lymph % 01/30/2023 16.1 (L)  18.0 - 48.0 % Final    Mono % 01/30/2023 7.6  4.0 - 15.0 % Final    Eosinophil % 01/30/2023 1.1  0.0 - 8.0 % Final    Basophil % 01/30/2023 0.4  0.0 - 1.9 % Final    Differential Method 01/30/2023 Automated   Final    HIV 1/2 Ag/Ab 01/30/2023 Non-reactive  Non-reactive Final    RPR 01/30/2023 Non-reactive  Non-reactive Final    Hb Electrophoresis Interpretation  01/30/2023 SEE BELOW   Final    Comment: The Hb A2 percentage is decreased of uncertain clinical   significance. See comment.    Comment: Causes of decreased Hb A2 include normal   variation, iron deficiency, delta or alpha variants, and   delta or alpha thalassemia mutations. No hemoglobin   variants are detected by this analysis. The vast majority   of hemoglobin variants are excluded although some rare   clinically significant hemoglobin disorders are   electrophoretically silent. If iron deficiency is excluded   and otherwise unexplained lifelong/familial symptoms such   as hemolysis, microcytosis, macrocytosis (reticulocytosis),   erythrocytosis, cyanosis, or hypoxia are present, further   testing may be useful. If desired, please call the   [facility] ([phone]).    If alpha thalassemia is a consideration, alpha globin gene  deletion/duplication analysis is available   (ATHAL/Alpha-Globin Gene Analysis). Additional sample   required.    Methodologies utilized in this interpretation include:                              capillary electrophoresis, HPLC      Hb Electrophoresis Interp Cancel 01/30/2023 Test Not Performed   Final    HPLC Hb Variant 01/30/2023 See Interpretation   Final    Comment: -------------------ADDITIONAL INFORMATION-------------------  This test has been modified from the 's   instructions. Its performance characteristics were   determined by [facility] in a manner consistent with CLIA   requirements. This test has not been cleared or approved by   the U.S. Food and Drug Administration.    Test Performed by:  [place] [address]  : Kwame James M.D. Ph.D.; CLIA# [id number]      Hemoglobin A 01/30/2023 98.1 (H)  95.8 - 98.0 % Final    Fetal Hemoglobin 01/30/2023 0.0  0.0 - 0.9 % Final    Hgb A2 Quant 01/30/2023 1.9 (L)  2.0 - 3.3 % Final    Comment:  -------------------ADDITIONAL INFORMATION-------------------  This test has been modified from the 's   instructions. Its performance characteristics were   determined by [facility] in a manner consistent with CLIA   requirements. This test has not been cleared or approved by   the U.S. Food and Drug Administration.      Hemoglobin Variant 1 01/30/2023 Test Not Performed   Final    Hemoglobin Variant 2 01/30/2023 Test Not Performed   Final    Hemoglobin Variant 3 01/30/2023 Test Not Performed   Final    HgB Variant, A2 and F Interpretati* 01/30/2023 Test Not Performed   Final   Lab Visit on 01/30/2023   Component Date Value Ref Range Status    Chlamydia, Amplified DNA 01/30/2023 Not Detected  Not Detected Final    N gonorrhoeae, amplified DNA 01/30/2023 Not Detected  Not Detected Final       ASSESSMENT/PLAN:    1. COVID-19  Overview:  - > 72 hour since symptoms and discussed not a candidate for Paxlovid but also her symptoms are mild  - symptoms mild and upper respiratory  - supportive care and counseled on OTC medication  - recommend to ER if any chest pain, shortness of breath, or rapidly worsening symptoms    Orders:  -     SARS Coronavirus 2 Antigen, POCT Manual Read    2. Pharyngitis, unspecified etiology  Overview:  - due to covid-19    Orders:  -     POCT Strep A, Molecular  -     Cancel: SARS Coronavirus 2 Antigen, POCT Manual Read  -     Cancel: POCT Influenza A/B Molecular  -     SARS Coronavirus 2 Antigen, POCT Manual Read    3. Vitamin D insufficiency  Overview:  Vitamin D 11 2/2020. Not treated.  - recommend D3 OTC 2000 units daily    Orders:  -     cholecalciferol, vitamin D3, (VITAMIN D3) 50 mcg (2,000 unit) Tab; Take 1 tablet (2,000 Units total) by mouth once daily.  Dispense: 90 tablet; Refill: 3    4. Essential hypertension  Overview:  - well controlled  - continue current management plan   - patient encouraged to notify me with any changes  - hypokalemia on HCTZ resolved      5.  Prediabetes  Overview:  Lab Results   Component Value Date    HGBA1C 5.9 (H) 01/30/2023     - discussed recommendation for diet and cardiovascular exercise  - counseling on lifestyle modifications for risk factor reduction  - counseling on management options      6. Hypokalemia  Overview:  Lab Results   Component Value Date    K 3.6 01/30/2023     - doing well on spironolactone and off of HCTZ and KCl      7. Normocytic anemia  Overview:  Lab Results   Component Value Date    HGB 11.2 (L) 01/30/2023     Lab Results   Component Value Date    HCT 36.6 (L) 01/30/2023     Lab Results   Component Value Date    IRON 36 01/30/2023    TRANSFERRIN 279 01/30/2023    TIBC 413 01/30/2023    FESATURATED 9 (L) 01/30/2023        - she reports a possible history of sickle cell trait  - 1/30/23 hemoglobin electrophoresis noted increased A2 of uncertain signficance              Vaccines recommended: flu, covid-19 booster and shingle when recovered    Follow-up in 1 month as previously scheduled  or sooner if any concerns.    This note is dictated using the M*Modal Fluency Direct word recognition program. There are word recognition mistakes that are occasionally missed on review.    Dr. Leanna Galeano D.O.   [facility]

## 2023-01-31 NOTE — TELEPHONE ENCOUNTER
Okay to overbook tomorrow 2/1/23 at 1 pm. Please send Keog[employer] message to patient with date and time. CC: sore throat    Dr. Leanna Galeano D.O.   [facility]

## 2023-02-01 ENCOUNTER — OFFICE VISIT (OUTPATIENT)
Dept: PRIMARY CARE CLINIC | Facility: CLINIC | Age: 53
End: 2023-02-01
Attending: FAMILY MEDICINE
Payer: COMMERCIAL

## 2023-02-01 VITALS
OXYGEN SATURATION: 98 % | SYSTOLIC BLOOD PRESSURE: 138 MMHG | HEART RATE: 67 BPM | HEIGHT: 65 IN | TEMPERATURE: 99 F | BODY MASS INDEX: 47.13 KG/M2 | WEIGHT: 282.88 LBS | DIASTOLIC BLOOD PRESSURE: 88 MMHG

## 2023-02-01 DIAGNOSIS — R73.03 PREDIABETES: ICD-10-CM

## 2023-02-01 DIAGNOSIS — D64.9 NORMOCYTIC ANEMIA: ICD-10-CM

## 2023-02-01 DIAGNOSIS — E87.6 HYPOKALEMIA: ICD-10-CM

## 2023-02-01 DIAGNOSIS — E55.9 VITAMIN D INSUFFICIENCY: ICD-10-CM

## 2023-02-01 DIAGNOSIS — I10 ESSENTIAL HYPERTENSION: ICD-10-CM

## 2023-02-01 DIAGNOSIS — J02.9 PHARYNGITIS, UNSPECIFIED ETIOLOGY: ICD-10-CM

## 2023-02-01 DIAGNOSIS — U07.1 COVID-19: Primary | ICD-10-CM

## 2023-02-01 LAB
CTP QC/QA: YES
CTP QC/QA: YES
MOLECULAR STREP A: NEGATIVE
SARS-COV-2 AG RESP QL IA.RAPID: POSITIVE

## 2023-02-01 PROCEDURE — 3079F DIAST BP 80-89 MM HG: CPT | Mod: CPTII,S$GLB,, | Performed by: FAMILY MEDICINE

## 2023-02-01 PROCEDURE — 3044F PR MOST RECENT HEMOGLOBIN A1C LEVEL <7.0%: ICD-10-PCS | Mod: CPTII,S$GLB,, | Performed by: FAMILY MEDICINE

## 2023-02-01 PROCEDURE — 3008F BODY MASS INDEX DOCD: CPT | Mod: CPTII,S$GLB,, | Performed by: FAMILY MEDICINE

## 2023-02-01 PROCEDURE — 3075F PR MOST RECENT SYSTOLIC BLOOD PRESS GE 130-139MM HG: ICD-10-PCS | Mod: CPTII,S$GLB,, | Performed by: FAMILY MEDICINE

## 2023-02-01 PROCEDURE — 3075F SYST BP GE 130 - 139MM HG: CPT | Mod: CPTII,S$GLB,, | Performed by: FAMILY MEDICINE

## 2023-02-01 PROCEDURE — 87651 POCT STREP A MOLECULAR: ICD-10-PCS | Mod: QW,S$GLB,, | Performed by: FAMILY MEDICINE

## 2023-02-01 PROCEDURE — 87811 SARS-COV-2 COVID19 W/OPTIC: CPT | Mod: QW,S$GLB,, | Performed by: FAMILY MEDICINE

## 2023-02-01 PROCEDURE — 87651 STREP A DNA AMP PROBE: CPT | Mod: QW,S$GLB,, | Performed by: FAMILY MEDICINE

## 2023-02-01 PROCEDURE — 87811 SARS CORONAVIRUS 2 ANTIGEN POCT, MANUAL READ: ICD-10-PCS | Mod: QW,S$GLB,, | Performed by: FAMILY MEDICINE

## 2023-02-01 PROCEDURE — 1159F PR MEDICATION LIST DOCUMENTED IN MEDICAL RECORD: ICD-10-PCS | Mod: CPTII,S$GLB,, | Performed by: FAMILY MEDICINE

## 2023-02-01 PROCEDURE — 3079F PR MOST RECENT DIASTOLIC BLOOD PRESSURE 80-89 MM HG: ICD-10-PCS | Mod: CPTII,S$GLB,, | Performed by: FAMILY MEDICINE

## 2023-02-01 PROCEDURE — 3044F HG A1C LEVEL LT 7.0%: CPT | Mod: CPTII,S$GLB,, | Performed by: FAMILY MEDICINE

## 2023-02-01 PROCEDURE — 1159F MED LIST DOCD IN RCRD: CPT | Mod: CPTII,S$GLB,, | Performed by: FAMILY MEDICINE

## 2023-02-01 PROCEDURE — 99214 OFFICE O/P EST MOD 30 MIN: CPT | Mod: S$GLB,,, | Performed by: FAMILY MEDICINE

## 2023-02-01 PROCEDURE — 3008F PR BODY MASS INDEX (BMI) DOCUMENTED: ICD-10-PCS | Mod: CPTII,S$GLB,, | Performed by: FAMILY MEDICINE

## 2023-02-01 PROCEDURE — 99999 PR PBB SHADOW E&M-EST. PATIENT-LVL III: ICD-10-PCS | Mod: PBBFAC,,, | Performed by: FAMILY MEDICINE

## 2023-02-01 PROCEDURE — 99214 PR OFFICE/OUTPT VISIT, EST, LEVL IV, 30-39 MIN: ICD-10-PCS | Mod: S$GLB,,, | Performed by: FAMILY MEDICINE

## 2023-02-01 PROCEDURE — 99999 PR PBB SHADOW E&M-EST. PATIENT-LVL III: CPT | Mod: PBBFAC,,, | Performed by: FAMILY MEDICINE

## 2023-02-01 RX ORDER — CHOLECALCIFEROL (VITAMIN D3) 50 MCG
2000 TABLET ORAL DAILY
Qty: 90 TABLET | Refills: 3 | Status: SHIPPED | OUTPATIENT
Start: 2023-02-01 | End: 2024-02-01

## 2023-02-01 RX ORDER — METRONIDAZOLE 500 MG/1
2000 TABLET ORAL ONCE
COMMUNITY
Start: 2023-01-26 | End: 2023-02-27 | Stop reason: ALTCHOICE

## 2023-02-01 NOTE — LETTER
February 1, 2023      [facility]  [address] [address]  Phone: [phone]  Fax: [phone]       Patient: Criss Link   YOB: 1970  Date of Visit: 02/01/2023    To Whom It May Concern:    Monica Link  was at [facility] on 02/01/2023. The patient may return to work with no restrictions 2/6/23. If you have any questions or concerns, or if I can be of further assistance, please do not hesitate to contact me.    Sincerely,      Leanna Galeano, DO

## 2023-02-17 ENCOUNTER — CLINICAL SUPPORT (OUTPATIENT)
Dept: ENDOSCOPY | Facility: HOSPITAL | Age: 53
End: 2023-02-17
Attending: FAMILY MEDICINE
Payer: COMMERCIAL

## 2023-02-17 DIAGNOSIS — Z12.11 ENCOUNTER FOR SCREENING COLONOSCOPY FOR NON-HIGH-RISK PATIENT: ICD-10-CM

## 2023-02-22 ENCOUNTER — PATIENT MESSAGE (OUTPATIENT)
Dept: BARIATRICS | Facility: CLINIC | Age: 53
End: 2023-02-22
Payer: COMMERCIAL

## 2023-02-27 ENCOUNTER — OFFICE VISIT (OUTPATIENT)
Dept: PRIMARY CARE CLINIC | Facility: CLINIC | Age: 53
End: 2023-02-27
Attending: FAMILY MEDICINE
Payer: COMMERCIAL

## 2023-02-27 VITALS
BODY MASS INDEX: 46.65 KG/M2 | OXYGEN SATURATION: 98 % | DIASTOLIC BLOOD PRESSURE: 96 MMHG | WEIGHT: 280 LBS | HEIGHT: 65 IN | SYSTOLIC BLOOD PRESSURE: 148 MMHG | HEART RATE: 71 BPM

## 2023-02-27 DIAGNOSIS — I10 ESSENTIAL HYPERTENSION: ICD-10-CM

## 2023-02-27 DIAGNOSIS — E66.01 CLASS 3 SEVERE OBESITY DUE TO EXCESS CALORIES WITH SERIOUS COMORBIDITY AND BODY MASS INDEX (BMI) OF 45.0 TO 49.9 IN ADULT: ICD-10-CM

## 2023-02-27 DIAGNOSIS — J01.00 ACUTE NON-RECURRENT MAXILLARY SINUSITIS: Primary | ICD-10-CM

## 2023-02-27 DIAGNOSIS — F33.0 MILD EPISODE OF RECURRENT MAJOR DEPRESSIVE DISORDER: ICD-10-CM

## 2023-02-27 DIAGNOSIS — F41.1 GENERALIZED ANXIETY DISORDER: ICD-10-CM

## 2023-02-27 DIAGNOSIS — D64.9 NORMOCYTIC ANEMIA: ICD-10-CM

## 2023-02-27 DIAGNOSIS — E55.9 VITAMIN D INSUFFICIENCY: ICD-10-CM

## 2023-02-27 PROBLEM — J02.9 PHARYNGITIS: Status: RESOLVED | Noted: 2023-02-01 | Resolved: 2023-02-27

## 2023-02-27 PROBLEM — U07.1 COVID-19: Status: RESOLVED | Noted: 2023-02-01 | Resolved: 2023-02-27

## 2023-02-27 PROBLEM — E87.6 HYPOKALEMIA: Status: RESOLVED | Noted: 2021-10-16 | Resolved: 2023-02-27

## 2023-02-27 PROCEDURE — 99999 PR PBB SHADOW E&M-EST. PATIENT-LVL IV: ICD-10-PCS | Mod: PBBFAC,,, | Performed by: FAMILY MEDICINE

## 2023-02-27 PROCEDURE — 99214 PR OFFICE/OUTPT VISIT, EST, LEVL IV, 30-39 MIN: ICD-10-PCS | Mod: S$GLB,,, | Performed by: FAMILY MEDICINE

## 2023-02-27 PROCEDURE — 3008F BODY MASS INDEX DOCD: CPT | Mod: CPTII,S$GLB,, | Performed by: FAMILY MEDICINE

## 2023-02-27 PROCEDURE — 1159F PR MEDICATION LIST DOCUMENTED IN MEDICAL RECORD: ICD-10-PCS | Mod: CPTII,S$GLB,, | Performed by: FAMILY MEDICINE

## 2023-02-27 PROCEDURE — 3008F PR BODY MASS INDEX (BMI) DOCUMENTED: ICD-10-PCS | Mod: CPTII,S$GLB,, | Performed by: FAMILY MEDICINE

## 2023-02-27 PROCEDURE — 99214 OFFICE O/P EST MOD 30 MIN: CPT | Mod: S$GLB,,, | Performed by: FAMILY MEDICINE

## 2023-02-27 PROCEDURE — 3044F PR MOST RECENT HEMOGLOBIN A1C LEVEL <7.0%: ICD-10-PCS | Mod: CPTII,S$GLB,, | Performed by: FAMILY MEDICINE

## 2023-02-27 PROCEDURE — 99999 PR PBB SHADOW E&M-EST. PATIENT-LVL IV: CPT | Mod: PBBFAC,,, | Performed by: FAMILY MEDICINE

## 2023-02-27 PROCEDURE — 3077F PR MOST RECENT SYSTOLIC BLOOD PRESSURE >= 140 MM HG: ICD-10-PCS | Mod: CPTII,S$GLB,, | Performed by: FAMILY MEDICINE

## 2023-02-27 PROCEDURE — 3080F DIAST BP >= 90 MM HG: CPT | Mod: CPTII,S$GLB,, | Performed by: FAMILY MEDICINE

## 2023-02-27 PROCEDURE — 1159F MED LIST DOCD IN RCRD: CPT | Mod: CPTII,S$GLB,, | Performed by: FAMILY MEDICINE

## 2023-02-27 PROCEDURE — 1160F RVW MEDS BY RX/DR IN RCRD: CPT | Mod: CPTII,S$GLB,, | Performed by: FAMILY MEDICINE

## 2023-02-27 PROCEDURE — 3077F SYST BP >= 140 MM HG: CPT | Mod: CPTII,S$GLB,, | Performed by: FAMILY MEDICINE

## 2023-02-27 PROCEDURE — 1160F PR REVIEW ALL MEDS BY PRESCRIBER/CLIN PHARMACIST DOCUMENTED: ICD-10-PCS | Mod: CPTII,S$GLB,, | Performed by: FAMILY MEDICINE

## 2023-02-27 PROCEDURE — 3044F HG A1C LEVEL LT 7.0%: CPT | Mod: CPTII,S$GLB,, | Performed by: FAMILY MEDICINE

## 2023-02-27 PROCEDURE — 3080F PR MOST RECENT DIASTOLIC BLOOD PRESSURE >= 90 MM HG: ICD-10-PCS | Mod: CPTII,S$GLB,, | Performed by: FAMILY MEDICINE

## 2023-02-27 RX ORDER — AMLODIPINE BESYLATE 5 MG/1
5 TABLET ORAL DAILY
Qty: 90 TABLET | Refills: 0 | Status: SHIPPED | OUTPATIENT
Start: 2023-02-27 | End: 2023-03-30 | Stop reason: SDUPTHER

## 2023-02-27 RX ORDER — FERROUS GLUCONATE 324(38)MG
324 TABLET ORAL
Qty: 90 TABLET | Refills: 0 | Status: SHIPPED | OUTPATIENT
Start: 2023-02-27 | End: 2023-05-25

## 2023-02-27 RX ORDER — AMOXICILLIN AND CLAVULANATE POTASSIUM 875; 125 MG/1; MG/1
1 TABLET, FILM COATED ORAL EVERY 12 HOURS
Qty: 20 TABLET | Refills: 0 | Status: SHIPPED | OUTPATIENT
Start: 2023-02-27 | End: 2023-03-09

## 2023-02-27 RX ORDER — BUSPIRONE HYDROCHLORIDE 5 MG/1
5 TABLET ORAL 2 TIMES DAILY
Qty: 180 TABLET | Refills: 0 | Status: CANCELLED | OUTPATIENT
Start: 2023-02-27 | End: 2023-05-28

## 2023-02-27 NOTE — LETTER
February 27, 2023      \[facility]\"" - Primary [facility]  [address] [address]  Phone: [phone]  Fax: [phone]       Patient: Criss Link   YOB: 1970  Date of Visit: 02/27/2023    To Whom It May Concern:    Monica Link  was at [facility] on 02/27/2023. The patient may return to work on 2/29/23 with no restrictions. If you have any questions or concerns, or if I can be of further assistance, please do not hesitate to contact me.    Sincerely,      Leanna Galeano, DO

## 2023-02-27 NOTE — PROGRESS NOTES
FAMILY MEDICINE  [facility]    Reason for visit:   Chief Complaint   Patient presents with    Cough    Nasal Congestion     Coughing up yellow and green phlem     Hypertension       HPI: Criss Link is a 52 y.o. female  - with obesity, hypertension, prediabetes, hyperlipidemia, anxiety, depression and CATE presents for nasal drainage, fatigue and cough    Gynecology Dr. Buddy Shaffer MD  Rheumatology Dr. Raciel Thayer MD  - for chronic CATE  Colon cancer screening: She reports that she does have her Cologuard at home.  She reports that she will completed it.     Since last visit she had covid-19 2/1/23 and reports that she recovered then 1 week ago she noticed increased sinus pressure, pain, cough with sinus drainage that was green and yellow. Worsening. No fevers. Taking OTC cough suppressant with phenylephrine, guaifenesin and APAP    1. Hypertension  - Obesity  - BP goal < 140/90    Current medication treatment:   spironolactone (ALDACTONE) 50 MG tablet, Take 1 tablet (50 mg total) by mouth once daily., Disp: 90 tablet, Rfl: 0    Medication side effects:  Recurrent hypokalemia and currently on potassium chloride     Exercise regimen: not active    Home BP cuff: No  How often does patient monitoring BP? NA      BMP  Lab Results       Component                Value               Date                       NA                       140                 01/30/2023                 K                        3.6                 01/30/2023                 CL                       102                 01/30/2023                 CO2                      25                  01/30/2023                 BUN                      12                  01/30/2023                 CREATININE               0.8                 01/30/2023                 CALCIUM                  8.8                 01/30/2023                 ANIONGAP                 13                  01/30/2023                 EGFRNORACEVR              >60.0               01/30/2023                      2. Depression and Anxiety    Age diagnosed: 52 yo     Today 2/27/23: she is on  Sertraline to 100 mg daily since 1/26.23 She reports that difficult to assess if any improvement since she got covid-19 and now a sinus infection and she is tired. No worsening. Stable    Prior notes:   1/26/23:  She reports that she was initially diagnosed with depression anxiety last year.  She reports she has increased stressors.  She lives with her mother and father.  She reports that her mother suffers from alcoholism.  She also has 2 teenage children.  She reports her daughter has been causing her some increased stress recently.  She works at a job that is also high stress and demands concentration.  She is a  at the [employer].  She reports initially when she started sertraline that it was effective however she reports the last several months she has had difficulty focusing.  She has had some increased anxiety and has been more down.  She denies any thoughts of self-harm.    Panic attacks: denies  Hopelessness:  denies  Sleep issues:  denies  Suicidal thoughts:  denies  Thoughts of self harm: denies  Thoughts of harm to others:  denies  History of suicide attempts:  denies  Family history of suicide: denies    Psychiatrist: none  Psychologist: none  Counselor : none    Prior medication: denies    Current medications:   sertraline (ZOLOFT) 100 MG tablet, Take 1 tablet (100 mg total) by mouth once daily., Disp: 90 tablet, Rfl: 0    Side effects of current treatment: not effective    Support system: family    Wt Readings from Last 10 Encounters:  02/27/23 : 127 kg (279 lb 15.8 oz)  02/01/23 : 128.3 kg (282 lb 13.6 oz)  01/26/23 : 126.8 kg (279 lb 6.9 oz)  11/14/22 : 128 kg (282 lb 3 oz)  10/04/22 : 125.6 kg (276 lb 14.4 oz)  07/18/22 : 120.4 kg (265 lb 6.9 oz)  05/09/22 : 121 kg (266 lb 12.1 oz)  04/07/22 : 121.3 kg (267 lb 6.4 oz)  02/24/22 : 123.2 kg (271 lb  "11.2 oz)  02/10/22 : 123 kg (271 lb 2.7 oz)                Review of Systems   All other systems reviewed and are negative.    Social History     Social History Narrative    . 1 son and 1 daughter (2023 daughter 19 yo). Mother and father live with her. Mother suffers from alcoholism. Sexually active    Tobacco: None    EtOH: 2 mixed drinks 3 times per week     Drug: None    Employment: Works at  [employer] (Works alongside [employer])     Education: 11th grade      Lives with her mother and 2 teenage children         ALLERGIES:   Review of patient's allergies indicates:  No Known Allergies    MEDS:   Current Outpatient Medications on File Prior to Visit   Medication Sig Dispense Refill Last Dose    azelastine (ASTELIN) 137 mcg (0.1 %) nasal spray SPRAY 1 SPRAY (137 MCG TOTAL) BY NASAL ROUTE 2 (TWO) TIMES DAILY. 30 mL 5 Taking    cholecalciferol, vitamin D3, (VITAMIN D3) 50 mcg (2,000 unit) Tab Take 1 tablet (2,000 Units total) by mouth once daily. 90 tablet 3 Taking    dorzolamide (TRUSOPT) 2 % ophthalmic solution Place 1 drop into both eyes 2 (two) times daily.   Taking    meloxicam (MOBIC) 15 MG tablet Take 1 tablet (15 mg total) by mouth once daily. 30 tablet 6 Taking    sertraline (ZOLOFT) 100 MG tablet Take 1 tablet (100 mg total) by mouth once daily. 90 tablet 0 Taking    spironolactone (ALDACTONE) 50 MG tablet Take 1 tablet (50 mg total) by mouth once daily. 90 tablet 0 Taking    TWYNEO 0.1-3 % Crea Apply topically.   Taking    [DISCONTINUED] metroNIDAZOLE (FLAGYL) 500 MG tablet Take 2,000 mg by mouth once.   Taking       Vital signs:   Vitals:    02/27/23 0925 02/27/23 0952   BP: (!) 150/102 (!) 148/96   Pulse: 71    SpO2: 98%    Weight: 127 kg (279 lb 15.8 oz)    Height: 5' 5" (1.651 m)      Body mass index is 46.59 kg/m².    PHYSICAL EXAM:     Physical Exam  Constitutional:       General: She is not in acute distress.  HENT:      Nose: Congestion and rhinorrhea present. Rhinorrhea is " purulent.      Right Sinus: Maxillary sinus tenderness and frontal sinus tenderness present.   Cardiovascular:      Rate and Rhythm: Normal rate and regular rhythm.      Pulses: Normal pulses.      Heart sounds: No murmur heard.    No friction rub. No gallop.   Pulmonary:      Effort: Pulmonary effort is normal. No respiratory distress.      Breath sounds: Normal breath sounds. No stridor. No wheezing, rhonchi or rales.   Neurological:      Mental Status: She is alert.   Psychiatric:         Speech: Speech normal.         PERTINENT RESULTS:   Office Visit on 02/01/2023   Component Date Value Ref Range Status    Molecular Strep A, POC 02/01/2023 Negative  Negative Final     Acceptable 02/01/2023 Yes   Final    SARS Coronavirus 2 Antigen 02/01/2023 Positive (A)  Negative Final     Acceptable 02/01/2023 Yes   Final   Lab Visit on 01/30/2023   Component Date Value Ref Range Status    Sodium 01/30/2023 140  136 - 145 mmol/L Final    Potassium 01/30/2023 3.6  3.5 - 5.1 mmol/L Final    Chloride 01/30/2023 102  95 - 110 mmol/L Final    CO2 01/30/2023 25  23 - 29 mmol/L Final    Glucose 01/30/2023 99  70 - 110 mg/dL Final    BUN 01/30/2023 12  6 - 20 mg/dL Final    Creatinine 01/30/2023 0.8  0.5 - 1.4 mg/dL Final    Calcium 01/30/2023 8.8  8.7 - 10.5 mg/dL Final    Total Protein 01/30/2023 7.0  6.0 - 8.4 g/dL Final    Albumin 01/30/2023 3.4 (L)  3.5 - 5.2 g/dL Final    Total Bilirubin 01/30/2023 0.3  0.1 - 1.0 mg/dL Final    Comment: For infants and newborns, interpretation of results should be based  on gestational age, weight and in agreement with clinical  observations.    Premature Infant recommended reference ranges:  Up to 24 hours.............<8.0 mg/dL  Up to 48 hours............<12.0 mg/dL  3-5 days..................<15.0 mg/dL  6-29 days.................<15.0 mg/dL      Alkaline Phosphatase 01/30/2023 72  55 - 135 U/L Final    AST 01/30/2023 14  10 - 40 U/L Final    ALT 01/30/2023 13   10 - 44 U/L Final    Anion Gap 01/30/2023 13  8 - 16 mmol/L Final    eGFR 01/30/2023 >60.0  >60 mL/min/1.73 m^2 Final    Cholesterol 01/30/2023 233 (H)  120 - 199 mg/dL Final    Comment: The National Cholesterol Education Program (NCEP) has set the  following guidelines (reference ranges) for Cholesterol:  Optimal.....................<200 mg/dL  Borderline High.............200-239 mg/dL  High........................> or = 240 mg/dL      Triglycerides 01/30/2023 96  30 - 150 mg/dL Final    Comment: The National Cholesterol Education Program (NCEP) has set the  following guidelines (reference values) for triglycerides:  Normal......................<150 mg/dL  Borderline High.............150-199 mg/dL  High........................200-499 mg/dL      HDL 01/30/2023 98 (H)  40 - 75 mg/dL Final    Comment: The National Cholesterol Education Program (NCEP) has set the  following guidelines (reference values) for HDL Cholesterol:  Low...............<40 mg/dL  Optimal...........>60 mg/dL      LDL Cholesterol 01/30/2023 115.8  63.0 - 159.0 mg/dL Final    Comment: The National Cholesterol Education Program (NCEP) has set the  following guidelines (reference values) for LDL Cholesterol:  Optimal.......................<130 mg/dL  Borderline High...............130-159 mg/dL  High..........................160-189 mg/dL  Very High.....................>190 mg/dL      HDL/Cholesterol Ratio 01/30/2023 42.1  20.0 - 50.0 % Final    Total Cholesterol/HDL Ratio 01/30/2023 2.4  2.0 - 5.0 Final    Non-HDL Cholesterol 01/30/2023 135  mg/dL Final    Comment: Risk category and Non-HDL cholesterol goals:  Coronary heart disease (CHD)or equivalent (10-year risk of CHD >20%):  Non-HDL cholesterol goal     <130 mg/dL  Two or more CHD risk factors and 10-year risk of CHD <= 20%:  Non-HDL cholesterol goal     <160 mg/dL  0 to 1 CHD risk factor:  Non-HDL cholesterol goal     <190 mg/dL      Ferritin 01/30/2023 21  20.0 - 300.0 ng/mL Final    Iron  01/30/2023 36  30 - 160 ug/dL Final    Transferrin 01/30/2023 279  200 - 375 mg/dL Final    TIBC 01/30/2023 413  250 - 450 ug/dL Final    Saturated Iron 01/30/2023 9 (L)  20 - 50 % Final    Hemoglobin A1C 01/30/2023 5.9 (H)  4.0 - 5.6 % Final    Comment: ADA Screening Guidelines:  5.7-6.4%  Consistent with prediabetes  >or=6.5%  Consistent with diabetes    High levels of fetal hemoglobin interfere with the HbA1C  assay. Heterozygous hemoglobin variants (HbS, HgC, etc)do  not significantly interfere with this assay.   However, presence of multiple variants may affect accuracy.      Estimated Avg Glucose 01/30/2023 123  68 - 131 mg/dL Final    Vit D, 25-Hydroxy 01/30/2023 15 (L)  30 - 96 ng/mL Final    Comment: Vitamin D deficiency.........<10 ng/mL                              Vitamin D insufficiency......10-29 ng/mL       Vitamin D sufficiency........> or equal to 30 ng/mL  Vitamin D toxicity............>100 ng/mL      WBC 01/30/2023 8.46  3.90 - 12.70 K/uL Final    RBC 01/30/2023 4.22  4.00 - 5.40 M/uL Final    Hemoglobin 01/30/2023 11.2 (L)  12.0 - 16.0 g/dL Final    Hematocrit 01/30/2023 36.6 (L)  37.0 - 48.5 % Final    MCV 01/30/2023 87  82 - 98 fL Final    MCH 01/30/2023 26.5 (L)  27.0 - 31.0 pg Final    MCHC 01/30/2023 30.6 (L)  32.0 - 36.0 g/dL Final    RDW 01/30/2023 15.5 (H)  11.5 - 14.5 % Final    Platelets 01/30/2023 424  150 - 450 K/uL Final    MPV 01/30/2023 11.0  9.2 - 12.9 fL Final    Immature Granulocytes 01/30/2023 0.2  0.0 - 0.5 % Final    Gran # (ANC) 01/30/2023 6.3  1.8 - 7.7 K/uL Final    Immature Grans (Abs) 01/30/2023 0.02  0.00 - 0.04 K/uL Final    Comment: Mild elevation in immature granulocytes is non specific and   can be seen in a variety of conditions including stress response,   acute inflammation, trauma and pregnancy. Correlation with other   laboratory and clinical findings is essential.      Lymph # 01/30/2023 1.4  1.0 - 4.8 K/uL Final    Mono # 01/30/2023 0.6  0.3 - 1.0 K/uL Final     Eos # 01/30/2023 0.1  0.0 - 0.5 K/uL Final    Baso # 01/30/2023 0.03  0.00 - 0.20 K/uL Final    nRBC 01/30/2023 0  0 /100 WBC Final    Gran % 01/30/2023 74.6 (H)  38.0 - 73.0 % Final    Lymph % 01/30/2023 16.1 (L)  18.0 - 48.0 % Final    Mono % 01/30/2023 7.6  4.0 - 15.0 % Final    Eosinophil % 01/30/2023 1.1  0.0 - 8.0 % Final    Basophil % 01/30/2023 0.4  0.0 - 1.9 % Final    Differential Method 01/30/2023 Automated   Final    HIV 1/2 Ag/Ab 01/30/2023 Non-reactive  Non-reactive Final    RPR 01/30/2023 Non-reactive  Non-reactive Final    Hb Electrophoresis Interpretation 01/30/2023 SEE BELOW   Final    Comment: The Hb A2 percentage is decreased of uncertain clinical   significance. See comment.    Comment: Causes of decreased Hb A2 include normal   variation, iron deficiency, delta or alpha variants, and   delta or alpha thalassemia mutations. No hemoglobin   variants are detected by this analysis. The vast majority   of hemoglobin variants are excluded although some rare   clinically significant hemoglobin disorders are   electrophoretically silent. If iron deficiency is excluded   and otherwise unexplained lifelong/familial symptoms such   as hemolysis, microcytosis, macrocytosis (reticulocytosis),   erythrocytosis, cyanosis, or hypoxia are present, further   testing may be useful. If desired, please call the   [facility] ([phone]).    If alpha thalassemia is a consideration, alpha globin gene  deletion/duplication analysis is available   (ATHAL/Alpha-Globin Gene Analysis). Additional sample   required.    Methodologies utilized in this interpretation include:                              capillary electrophoresis, HPLC      Hb Electrophoresis Interp Cancel 01/30/2023 Test Not Performed   Final    HPLC Hb Variant 01/30/2023 See Interpretation   Final    Comment: -------------------ADDITIONAL INFORMATION-------------------  This test has been modified from the 's    instructions. Its performance characteristics were   determined by [facility] in a manner consistent with CLIA   requirements. This test has not been cleared or approved by   the U.S. Food and Drug Administration.    Test Performed by:  [facility] - [address] [address]  : Kwame James M.D. Ph.D.; CLIA# [id number]      Hemoglobin A 01/30/2023 98.1 (H)  95.8 - 98.0 % Final    Fetal Hemoglobin 01/30/2023 0.0  0.0 - 0.9 % Final    Hgb A2 Quant 01/30/2023 1.9 (L)  2.0 - 3.3 % Final    Comment: -------------------ADDITIONAL INFORMATION-------------------  This test has been modified from the 's   instructions. Its performance characteristics were   determined by [facility] in a manner consistent with CLIA   requirements. This test has not been cleared or approved by   the U.S. Food and Drug Administration.      Hemoglobin Variant 1 01/30/2023 Test Not Performed   Final    Hemoglobin Variant 2 01/30/2023 Test Not Performed   Final    Hemoglobin Variant 3 01/30/2023 Test Not Performed   Final    HgB Variant, A2 and F Interpretati* 01/30/2023 Test Not Performed   Final   Lab Visit on 01/30/2023   Component Date Value Ref Range Status    Chlamydia, Amplified DNA 01/30/2023 Not Detected  Not Detected Final    N gonorrhoeae, amplified DNA 01/30/2023 Not Detected  Not Detected Final         Results for orders placed during the hospital encounter of 09/22/20    Echo Color Flow Doppler? Yes    Interpretation Summary  · There is left ventricular concentric remodeling.  · Normal left ventricular systolic function. The estimated ejection fraction is 60-65%.  · Normal LV diastolic function.  · Normal right ventricular systolic function.  · The estimated PA systolic pressure is 23 mmHg.  · No wall motion abnormalities.  · Normal central venous pressure (3 mmHg).    ASSESSMENT/PLAN:    1. Acute non-recurrent maxillary sinusitis  Overview:  -  counseling on safe OTC cough suppressants for HBP  - recommend nasal saline lavage  - recommend Augmentin 875/125 mg BID x 10 days  - counseling regarding new medication including expected results, potential side effects, and appropriate use.  - questions elicited and answered  - encouraged to patient to notify me of any questions or concerns    Orders:  -     amoxicillin-clavulanate 875-125mg (AUGMENTIN) 875-125 mg per tablet; Take 1 tablet by mouth every 12 (twelve) hours. for 10 days  Dispense: 20 tablet; Refill: 0    2. Essential hypertension  Overview:  - above goal and taking OTC cough suppressant with phenylephrine that may be affecting BP  - recommend monitor  - recommend if still elevated start amlodipine 5 mg daily and continue spironolactone 50 mg daily  - questions elicited and answered  - encouraged to patient to notify me of any questions or concerns    Orders:  -     amLODIPine (NORVASC) 5 MG tablet; Take 1 tablet (5 mg total) by mouth once daily.  Dispense: 90 tablet; Refill: 0    3. Generalized anxiety disorder  Overview:  See depression      4. Mild episode of recurrent major depressive disorder  Overview:  - reports difficult to assess since she has been ill. Denies worsening  - continue same medication      5. Normocytic anemia  Overview:  Lab Results   Component Value Date    HGB 11.2 (L) 01/30/2023     Lab Results   Component Value Date    HCT 36.6 (L) 01/30/2023     Lab Results   Component Value Date    IRON 36 01/30/2023    TRANSFERRIN 279 01/30/2023    TIBC 413 01/30/2023    FESATURATED 9 (L) 01/30/2023        - she reports a possible history of sickle cell trait  - 1/30/23 hemoglobin electrophoresis noted increased A2 of uncertain signficance  - she has been on iron in the past  - suspect level or iron def  - recommend start ferrous gluconate daily  - high recommend complete complete her colon cancer screening    Orders:  -     ferrous gluconate (FERGON) 324 MG tablet; Take 1 tablet (324 mg  total) by mouth daily with breakfast.  Dispense: 90 tablet; Refill: 0    6. Class 3 severe obesity due to excess calories with serious comorbidity and body mass index (BMI) of 45.0 to 49.9 in adult  Overview:  - discussed recommendation for diet and cardiovascular exercise  - counseling on lifestyle modifications for risk factor reduction  - counseling on management options      7. Vitamin D insufficiency  Overview:  Vitamin D 11 2/2020. Not treated.  - not taking OTC D3   - recommend D3 OTC 2000 units daily          Follow-up in 1 month BP or sooner if any concerns.    This note is dictated using the M*Modal Fluency Direct word recognition program. There are word recognition mistakes that are occasionally missed on review.    Dr. Leanna Galeano D.O.   Family Medicine

## 2023-02-27 NOTE — PATIENT INSTRUCTIONS
Make sure you are taking over the counter vitamin D3 2000 units daily  Start Ferrous gluconate 324 mg daily and complete your colon cancer screening   Start nasal lavage 2-3 times a day  Start new blood pressure medication Amlodipine 5 mg daily and continue spironolactone 50 mg daily

## 2023-03-01 ENCOUNTER — PATIENT MESSAGE (OUTPATIENT)
Dept: PRIMARY CARE CLINIC | Facility: CLINIC | Age: 53
End: 2023-03-01
Payer: COMMERCIAL

## 2023-03-01 RX ORDER — PREDNISONE 20 MG/1
40 TABLET ORAL DAILY
Qty: 10 TABLET | Refills: 0 | Status: SHIPPED | OUTPATIENT
Start: 2023-03-01 | End: 2023-03-06

## 2023-03-30 ENCOUNTER — OFFICE VISIT (OUTPATIENT)
Dept: PRIMARY CARE CLINIC | Facility: CLINIC | Age: 53
End: 2023-03-30
Attending: FAMILY MEDICINE
Payer: COMMERCIAL

## 2023-03-30 VITALS
HEIGHT: 65 IN | DIASTOLIC BLOOD PRESSURE: 78 MMHG | BODY MASS INDEX: 46.78 KG/M2 | WEIGHT: 280.75 LBS | OXYGEN SATURATION: 98 % | SYSTOLIC BLOOD PRESSURE: 124 MMHG | HEART RATE: 67 BPM

## 2023-03-30 DIAGNOSIS — J31.0 CHRONIC RHINITIS: ICD-10-CM

## 2023-03-30 DIAGNOSIS — I10 ESSENTIAL HYPERTENSION: Primary | ICD-10-CM

## 2023-03-30 DIAGNOSIS — F33.42 RECURRENT MAJOR DEPRESSIVE DISORDER, IN FULL REMISSION: ICD-10-CM

## 2023-03-30 DIAGNOSIS — E66.01 CLASS 3 SEVERE OBESITY DUE TO EXCESS CALORIES WITH SERIOUS COMORBIDITY AND BODY MASS INDEX (BMI) OF 45.0 TO 49.9 IN ADULT: ICD-10-CM

## 2023-03-30 DIAGNOSIS — L68.0 HIRSUTISM: ICD-10-CM

## 2023-03-30 DIAGNOSIS — F41.1 GENERALIZED ANXIETY DISORDER: ICD-10-CM

## 2023-03-30 DIAGNOSIS — R73.03 PREDIABETES: ICD-10-CM

## 2023-03-30 PROBLEM — J01.00 ACUTE NON-RECURRENT MAXILLARY SINUSITIS: Status: RESOLVED | Noted: 2023-02-27 | Resolved: 2023-03-30

## 2023-03-30 PROCEDURE — 3078F DIAST BP <80 MM HG: CPT | Mod: CPTII,S$GLB,, | Performed by: FAMILY MEDICINE

## 2023-03-30 PROCEDURE — 3008F BODY MASS INDEX DOCD: CPT | Mod: CPTII,S$GLB,, | Performed by: FAMILY MEDICINE

## 2023-03-30 PROCEDURE — 3044F PR MOST RECENT HEMOGLOBIN A1C LEVEL <7.0%: ICD-10-PCS | Mod: CPTII,S$GLB,, | Performed by: FAMILY MEDICINE

## 2023-03-30 PROCEDURE — 1159F PR MEDICATION LIST DOCUMENTED IN MEDICAL RECORD: ICD-10-PCS | Mod: CPTII,S$GLB,, | Performed by: FAMILY MEDICINE

## 2023-03-30 PROCEDURE — 99999 PR PBB SHADOW E&M-EST. PATIENT-LVL III: CPT | Mod: PBBFAC,,, | Performed by: FAMILY MEDICINE

## 2023-03-30 PROCEDURE — 3078F PR MOST RECENT DIASTOLIC BLOOD PRESSURE < 80 MM HG: ICD-10-PCS | Mod: CPTII,S$GLB,, | Performed by: FAMILY MEDICINE

## 2023-03-30 PROCEDURE — 99999 PR PBB SHADOW E&M-EST. PATIENT-LVL III: ICD-10-PCS | Mod: PBBFAC,,, | Performed by: FAMILY MEDICINE

## 2023-03-30 PROCEDURE — 99214 PR OFFICE/OUTPT VISIT, EST, LEVL IV, 30-39 MIN: ICD-10-PCS | Mod: S$GLB,,, | Performed by: FAMILY MEDICINE

## 2023-03-30 PROCEDURE — 1160F PR REVIEW ALL MEDS BY PRESCRIBER/CLIN PHARMACIST DOCUMENTED: ICD-10-PCS | Mod: CPTII,S$GLB,, | Performed by: FAMILY MEDICINE

## 2023-03-30 PROCEDURE — 1160F RVW MEDS BY RX/DR IN RCRD: CPT | Mod: CPTII,S$GLB,, | Performed by: FAMILY MEDICINE

## 2023-03-30 PROCEDURE — 1159F MED LIST DOCD IN RCRD: CPT | Mod: CPTII,S$GLB,, | Performed by: FAMILY MEDICINE

## 2023-03-30 PROCEDURE — 99214 OFFICE O/P EST MOD 30 MIN: CPT | Mod: S$GLB,,, | Performed by: FAMILY MEDICINE

## 2023-03-30 PROCEDURE — 3008F PR BODY MASS INDEX (BMI) DOCUMENTED: ICD-10-PCS | Mod: CPTII,S$GLB,, | Performed by: FAMILY MEDICINE

## 2023-03-30 PROCEDURE — 3044F HG A1C LEVEL LT 7.0%: CPT | Mod: CPTII,S$GLB,, | Performed by: FAMILY MEDICINE

## 2023-03-30 PROCEDURE — 3074F SYST BP LT 130 MM HG: CPT | Mod: CPTII,S$GLB,, | Performed by: FAMILY MEDICINE

## 2023-03-30 PROCEDURE — 3074F PR MOST RECENT SYSTOLIC BLOOD PRESSURE < 130 MM HG: ICD-10-PCS | Mod: CPTII,S$GLB,, | Performed by: FAMILY MEDICINE

## 2023-03-30 RX ORDER — MONTELUKAST SODIUM 10 MG/1
10 TABLET ORAL NIGHTLY
Qty: 90 TABLET | Refills: 3 | Status: SHIPPED | OUTPATIENT
Start: 2023-03-30 | End: 2024-03-29

## 2023-03-30 RX ORDER — SPIRONOLACTONE 50 MG/1
50 TABLET, FILM COATED ORAL DAILY
Qty: 90 TABLET | Refills: 3 | Status: SHIPPED | OUTPATIENT
Start: 2023-03-30 | End: 2024-01-10 | Stop reason: SDUPTHER

## 2023-03-30 RX ORDER — SERTRALINE HYDROCHLORIDE 100 MG/1
100 TABLET, FILM COATED ORAL DAILY
Qty: 90 TABLET | Refills: 3 | Status: SHIPPED | OUTPATIENT
Start: 2023-03-30 | End: 2024-01-10 | Stop reason: SDUPTHER

## 2023-03-30 RX ORDER — AMLODIPINE BESYLATE 5 MG/1
5 TABLET ORAL DAILY
Qty: 90 TABLET | Refills: 3 | Status: SHIPPED | OUTPATIENT
Start: 2023-03-30 | End: 2024-01-10 | Stop reason: SDUPTHER

## 2023-03-30 NOTE — PROGRESS NOTES
FAMILY MEDICINE  [facility]    Reason for visit:   Chief Complaint   Patient presents with    Follow-up     Blood pressure    Sinus Problem    Anxiety    Depression       HPI: Criss Link is a 52 y.o. female  - with obesity, hypertension, prediabetes, hyperlipidemia, anxiety, depression and CATE presents for follow-up hypertension, anxiety, depression has concerns for worsening allergies    Gynecology Dr. Buddy Shaffer MD  Rheumatology Dr. Raciel Thayer MD  - for chronic CATE  Colon cancer screening: She reports that she does have her Cologuard at home.  She reports that she will completed it.     She reports that she is been doing well since her last visit however in the last 3 days for allergies have been worsening.  She is sinus congestion.  She is taking over-the-counter Allegra.  She typically has worsening allergies around this time of the year which result in recurrent sinus infections.  She does have nasal congestion.  She reports the nasal saline rinse has been helpful.  She denies any fevers or chills or sinus pain    1. Hypertension  - Obesity  - BP goal < 140/90    The patient's last visit with me was on 2/27/2023 and since then she is started amlodipine 5 mg daily with her spironolactone 50 mg daily.  She reports that she is tolerating the medication well.  She denies any unwanted side effects.     Current medication treatment:   amLODIPine (NORVASC) 5 MG tablet, Take 1 tablet (5 mg total) by mouth once daily., Disp: 90 tablet, Rfl: 0  - added 2/7/23  spironolactone (ALDACTONE) 50 MG tablet, Take 1 tablet (50 mg total) by mouth once daily., Disp: 90 tablet, Rfl: 0    Medication side effects:  denies    Prior medication:  Chlorthalidone-hyponatremia    Exercise regimen: not active    Home BP cuff: No  How often does patient monitoring BP? NA      BMP  Lab Results       Component                Value               Date                       NA                       140                  01/30/2023                 K                        3.6                 01/30/2023                 CL                       102                 01/30/2023                 CO2                      25                  01/30/2023                 BUN                      12                  01/30/2023                 CREATININE               0.8                 01/30/2023                 CALCIUM                  8.8                 01/30/2023                 ANIONGAP                 13                  01/30/2023                 EGFRNORACEVR             >60.0               01/30/2023                2. Depression and Anxiety    Age diagnosed: 50 yo     Today 3/30/23:  She does have increased stressors with her daughter.  She reports her daughter is graduating high school this year.  She is 18 years old.  There have been some recent behavioral issues.  However she reports that she seems to be doing well with the sertraline 100 mg daily.  Reports that it seems to help her get through the day and less stress.  It helps her get to work.    Prior notes:   2/27/23: she is on  Sertraline to 100 mg daily since 1/26.23 She reports that difficult to assess if any improvement since she got covid-19 and now a sinus infection and she is tired. No worsening. Stable  1/26/23:  She reports that she was initially diagnosed with depression anxiety last year.  She reports she has increased stressors.  She lives with her mother and father.  She reports that her mother suffers from alcoholism.  She also has 2 teenage children.  She reports her daughter has been causing her some increased stress recently.  She works at a job that is also high stress and demands concentration.  She is a  at the [employer].  She reports initially when she started sertraline that it was effective however she reports the last several months she has had difficulty focusing.  She has had some increased anxiety and has been more down.  She  denies any thoughts of self-harm.    Panic attacks: denies  Hopelessness:  denies  Sleep issues:  denies  Suicidal thoughts:  denies  Thoughts of self harm: denies  Thoughts of harm to others:  denies  History of suicide attempts:  denies  Family history of suicide: denies    Psychiatrist: none  Psychologist: none  Counselor : none    Prior medication: denies    Current medications:   sertraline (ZOLOFT) 100 MG tablet, Take 1 tablet (100 mg total) by mouth once daily., Disp: 90 tablet, Rfl: 0    Side effects of current treatment: not effective    Support system: family            Review of Systems   All other systems reviewed and are negative.    Social History     Social History Narrative    . 1 son and 1 daughter (2023 daughter 19 yo). Mother and father live with her. Mother suffers from alcoholism. Sexually active    Tobacco: None    EtOH: 2 mixed drinks 3 times per week     Drug: None    Employment: Works at  [employer] (Works alongside [employer])     Education: 11th grade      Lives with her mother and 2 teenage children         ALLERGIES:   Review of patient's allergies indicates:  No Known Allergies    MEDS:   Outpatient Medications as of 3/30/2023   Medication Sig Dispense Refill    azelastine (ASTELIN) 137 mcg (0.1 %) nasal spray SPRAY 1 SPRAY (137 MCG TOTAL) BY NASAL ROUTE 2 (TWO) TIMES DAILY. 30 mL 5    cholecalciferol, vitamin D3, (VITAMIN D3) 50 mcg (2,000 unit) Tab Take 1 tablet (2,000 Units total) by mouth once daily. 90 tablet 3    dorzolamide (TRUSOPT) 2 % ophthalmic solution Place 1 drop into both eyes 2 (two) times daily.      ferrous gluconate (FERGON) 324 MG tablet Take 1 tablet (324 mg total) by mouth daily with breakfast. 90 tablet 0    meloxicam (MOBIC) 15 MG tablet Take 1 tablet (15 mg total) by mouth once daily. 30 tablet 6    TWYNEO 0.1-3 % Crea Apply topically.      amLODIPine (NORVASC) 5 MG tablet Take 1 tablet (5 mg total) by mouth once daily. 90 tablet 3    sertraline  "(ZOLOFT) 100 MG tablet Take 1 tablet (100 mg total) by mouth once daily. 90 tablet 3    spironolactone (ALDACTONE) 50 MG tablet Take 1 tablet (50 mg total) by mouth once daily. 90 tablet 3     No current facility-administered medications on file as of 3/30/2023.       Vital signs:   Vitals:    03/30/23 1443   BP: 124/78   Pulse: 67   SpO2: 98%   Weight: 127.3 kg (280 lb 12.1 oz)   Height: 5' 5" (1.651 m)     Body mass index is 46.72 kg/m².    PHYSICAL EXAM:     Physical Exam  Constitutional:       General: She is not in acute distress.  HENT:      Right Ear: Tympanic membrane and ear canal normal.      Left Ear: Tympanic membrane and ear canal normal.      Nose: Rhinorrhea present. No mucosal edema or congestion. Rhinorrhea is clear.      Right Sinus: No maxillary sinus tenderness or frontal sinus tenderness.      Left Sinus: No maxillary sinus tenderness or frontal sinus tenderness.      Mouth/Throat:      Lips: Pink.      Mouth: Mucous membranes are dry.      Pharynx: Oropharynx is clear.   Cardiovascular:      Rate and Rhythm: Normal rate and regular rhythm.      Pulses: Normal pulses.      Heart sounds: Normal heart sounds. No murmur heard.    No friction rub. No gallop.   Pulmonary:      Effort: Pulmonary effort is normal.      Breath sounds: Normal breath sounds. No wheezing, rhonchi or rales.   Musculoskeletal:      Cervical back: Neck supple.      Right lower leg: No edema.      Left lower leg: No edema.   Neurological:      Mental Status: She is alert.         PERTINENT RESULTS:   Office Visit on 02/01/2023   Component Date Value Ref Range Status    Molecular Strep A, POC 02/01/2023 Negative  Negative Final     Acceptable 02/01/2023 Yes   Final    SARS Coronavirus 2 Antigen 02/01/2023 Positive (A)  Negative Final     Acceptable 02/01/2023 Yes   Final   Lab Visit on 01/30/2023   Component Date Value Ref Range Status    Sodium 01/30/2023 140  136 - 145 mmol/L Final    Potassium " 01/30/2023 3.6  3.5 - 5.1 mmol/L Final    Chloride 01/30/2023 102  95 - 110 mmol/L Final    CO2 01/30/2023 25  23 - 29 mmol/L Final    Glucose 01/30/2023 99  70 - 110 mg/dL Final    BUN 01/30/2023 12  6 - 20 mg/dL Final    Creatinine 01/30/2023 0.8  0.5 - 1.4 mg/dL Final    Calcium 01/30/2023 8.8  8.7 - 10.5 mg/dL Final    Total Protein 01/30/2023 7.0  6.0 - 8.4 g/dL Final    Albumin 01/30/2023 3.4 (L)  3.5 - 5.2 g/dL Final    Total Bilirubin 01/30/2023 0.3  0.1 - 1.0 mg/dL Final    Comment: For infants and newborns, interpretation of results should be based  on gestational age, weight and in agreement with clinical  observations.    Premature Infant recommended reference ranges:  Up to 24 hours.............<8.0 mg/dL  Up to 48 hours............<12.0 mg/dL  3-5 days..................<15.0 mg/dL  6-29 days.................<15.0 mg/dL      Alkaline Phosphatase 01/30/2023 72  55 - 135 U/L Final    AST 01/30/2023 14  10 - 40 U/L Final    ALT 01/30/2023 13  10 - 44 U/L Final    Anion Gap 01/30/2023 13  8 - 16 mmol/L Final    eGFR 01/30/2023 >60.0  >60 mL/min/1.73 m^2 Final    Cholesterol 01/30/2023 233 (H)  120 - 199 mg/dL Final    Comment: The National Cholesterol Education Program (NCEP) has set the  following guidelines (reference ranges) for Cholesterol:  Optimal.....................<200 mg/dL  Borderline High.............200-239 mg/dL  High........................> or = 240 mg/dL      Triglycerides 01/30/2023 96  30 - 150 mg/dL Final    Comment: The National Cholesterol Education Program (NCEP) has set the  following guidelines (reference values) for triglycerides:  Normal......................<150 mg/dL  Borderline High.............150-199 mg/dL  High........................200-499 mg/dL      HDL 01/30/2023 98 (H)  40 - 75 mg/dL Final    Comment: The National Cholesterol Education Program (NCEP) has set the  following guidelines (reference values) for HDL Cholesterol:  Low...............<40  mg/dL  Optimal...........>60 mg/dL      LDL Cholesterol 01/30/2023 115.8  63.0 - 159.0 mg/dL Final    Comment: The National Cholesterol Education Program (NCEP) has set the  following guidelines (reference values) for LDL Cholesterol:  Optimal.......................<130 mg/dL  Borderline High...............130-159 mg/dL  High..........................160-189 mg/dL  Very High.....................>190 mg/dL      HDL/Cholesterol Ratio 01/30/2023 42.1  20.0 - 50.0 % Final    Total Cholesterol/HDL Ratio 01/30/2023 2.4  2.0 - 5.0 Final    Non-HDL Cholesterol 01/30/2023 135  mg/dL Final    Comment: Risk category and Non-HDL cholesterol goals:  Coronary heart disease (CHD)or equivalent (10-year risk of CHD >20%):  Non-HDL cholesterol goal     <130 mg/dL  Two or more CHD risk factors and 10-year risk of CHD <= 20%:  Non-HDL cholesterol goal     <160 mg/dL  0 to 1 CHD risk factor:  Non-HDL cholesterol goal     <190 mg/dL      Ferritin 01/30/2023 21  20.0 - 300.0 ng/mL Final    Iron 01/30/2023 36  30 - 160 ug/dL Final    Transferrin 01/30/2023 279  200 - 375 mg/dL Final    TIBC 01/30/2023 413  250 - 450 ug/dL Final    Saturated Iron 01/30/2023 9 (L)  20 - 50 % Final    Hemoglobin A1C 01/30/2023 5.9 (H)  4.0 - 5.6 % Final    Comment: ADA Screening Guidelines:  5.7-6.4%  Consistent with prediabetes  >or=6.5%  Consistent with diabetes    High levels of fetal hemoglobin interfere with the HbA1C  assay. Heterozygous hemoglobin variants (HbS, HgC, etc)do  not significantly interfere with this assay.   However, presence of multiple variants may affect accuracy.      Estimated Avg Glucose 01/30/2023 123  68 - 131 mg/dL Final    Vit D, 25-Hydroxy 01/30/2023 15 (L)  30 - 96 ng/mL Final    Comment: Vitamin D deficiency.........<10 ng/mL                              Vitamin D insufficiency......10-29 ng/mL       Vitamin D sufficiency........> or equal to 30 ng/mL  Vitamin D toxicity............>100 ng/mL      WBC 01/30/2023 8.46  3.90 -  12.70 K/uL Final    RBC 01/30/2023 4.22  4.00 - 5.40 M/uL Final    Hemoglobin 01/30/2023 11.2 (L)  12.0 - 16.0 g/dL Final    Hematocrit 01/30/2023 36.6 (L)  37.0 - 48.5 % Final    MCV 01/30/2023 87  82 - 98 fL Final    MCH 01/30/2023 26.5 (L)  27.0 - 31.0 pg Final    MCHC 01/30/2023 30.6 (L)  32.0 - 36.0 g/dL Final    RDW 01/30/2023 15.5 (H)  11.5 - 14.5 % Final    Platelets 01/30/2023 424  150 - 450 K/uL Final    MPV 01/30/2023 11.0  9.2 - 12.9 fL Final    Immature Granulocytes 01/30/2023 0.2  0.0 - 0.5 % Final    Gran # (ANC) 01/30/2023 6.3  1.8 - 7.7 K/uL Final    Immature Grans (Abs) 01/30/2023 0.02  0.00 - 0.04 K/uL Final    Comment: Mild elevation in immature granulocytes is non specific and   can be seen in a variety of conditions including stress response,   acute inflammation, trauma and pregnancy. Correlation with other   laboratory and clinical findings is essential.      Lymph # 01/30/2023 1.4  1.0 - 4.8 K/uL Final    Mono # 01/30/2023 0.6  0.3 - 1.0 K/uL Final    Eos # 01/30/2023 0.1  0.0 - 0.5 K/uL Final    Baso # 01/30/2023 0.03  0.00 - 0.20 K/uL Final    nRBC 01/30/2023 0  0 /100 WBC Final    Gran % 01/30/2023 74.6 (H)  38.0 - 73.0 % Final    Lymph % 01/30/2023 16.1 (L)  18.0 - 48.0 % Final    Mono % 01/30/2023 7.6  4.0 - 15.0 % Final    Eosinophil % 01/30/2023 1.1  0.0 - 8.0 % Final    Basophil % 01/30/2023 0.4  0.0 - 1.9 % Final    Differential Method 01/30/2023 Automated   Final    HIV 1/2 Ag/Ab 01/30/2023 Non-reactive  Non-reactive Final    RPR 01/30/2023 Non-reactive  Non-reactive Final    Hb Electrophoresis Interpretation 01/30/2023 SEE BELOW   Final    Comment: The Hb A2 percentage is decreased of uncertain clinical   significance. See comment.    Comment: Causes of decreased Hb A2 include normal   variation, iron deficiency, delta or alpha variants, and   delta or alpha thalassemia mutations. No hemoglobin   variants are detected by this analysis. The vast majority   of hemoglobin variants  are excluded although some rare   clinically significant hemoglobin disorders are   electrophoretically silent. If iron deficiency is excluded   and otherwise unexplained lifelong/familial symptoms such   as hemolysis, microcytosis, macrocytosis (reticulocytosis),   erythrocytosis, cyanosis, or hypoxia are present, further   testing may be useful. If desired, please call the   [facility] ([phone]).    If alpha thalassemia is a consideration, alpha globin gene  deletion/duplication analysis is available   (ATHAL/Alpha-Globin Gene Analysis). Additional sample   required.    Methodologies utilized in this interpretation include:                              capillary electrophoresis, HPLC      Hb Electrophoresis Interp Cancel 01/30/2023 Test Not Performed   Final    HPLC Hb Variant 01/30/2023 See Interpretation   Final    Comment: -------------------ADDITIONAL INFORMATION-------------------  This test has been modified from the 's   instructions. Its performance characteristics were   determined by [facility] in a manner consistent with CLIA   requirements. This test has not been cleared or approved by   the U.S. Food and Drug Administration.    Test Performed by:  [place] [address]  : Kwame James M.D. Ph.D.; CLIA# [id number]      Hemoglobin A 01/30/2023 98.1 (H)  95.8 - 98.0 % Final    Fetal Hemoglobin 01/30/2023 0.0  0.0 - 0.9 % Final    Hgb A2 Quant 01/30/2023 1.9 (L)  2.0 - 3.3 % Final    Comment: -------------------ADDITIONAL INFORMATION-------------------  This test has been modified from the 's   instructions. Its performance characteristics were   determined by [facility] in a manner consistent with CLIA   requirements. This test has not been cleared or approved by   the U.S. Food and Drug Administration.      Hemoglobin Variant 1 01/30/2023 Test Not Performed   Final     Hemoglobin Variant 2 01/30/2023 Test Not Performed   Final    Hemoglobin Variant 3 01/30/2023 Test Not Performed   Final    HgB Variant, A2 and F Interpretati* 01/30/2023 Test Not Performed   Final   Lab Visit on 01/30/2023   Component Date Value Ref Range Status    Chlamydia, Amplified DNA 01/30/2023 Not Detected  Not Detected Final    N gonorrhoeae, amplified DNA 01/30/2023 Not Detected  Not Detected Final       ASSESSMENT/PLAN:    1. Essential hypertension  Overview:  - well controlled  - continue current management plan   - patient encouraged to notify me with any changes    Orders:  -     Comprehensive Metabolic Panel; Future; Expected date: 01/30/2024  -     CBC Without Differential; Future; Expected date: 01/30/2024  -     Lipid Panel; Future; Expected date: 01/30/2024  -     spironolactone (ALDACTONE) 50 MG tablet; Take 1 tablet (50 mg total) by mouth once daily.  Dispense: 90 tablet; Refill: 3  -     amLODIPine (NORVASC) 5 MG tablet; Take 1 tablet (5 mg total) by mouth once daily.  Dispense: 90 tablet; Refill: 3    2. Hirsutism  Overview:  - currently on spironolactone    Orders:  -     spironolactone (ALDACTONE) 50 MG tablet; Take 1 tablet (50 mg total) by mouth once daily.  Dispense: 90 tablet; Refill: 3    3. Prediabetes  Overview:  Lab Results   Component Value Date    HGBA1C 5.9 (H) 01/30/2023     - discussed recommendation for diet and cardiovascular exercise  - counseling on lifestyle modifications for risk factor reduction  - counseling on management options    Orders:  -     Hemoglobin A1C; Future; Expected date: 01/30/2024    4. Chronic rhinitis  Overview:  - with seasonal allergies and recurrent sinus infections   -recommend trial montelukast 10 mg nightly  - counseling regarding new medication including expected results, potential side effects, and appropriate use.  - questions elicited and answered  - encouraged to patient to notify me of any questions or concerns    Orders:  -      montelukast (SINGULAIR) 10 mg tablet; Take 1 tablet (10 mg total) by mouth every evening.  Dispense: 90 tablet; Refill: 3    5. Recurrent major depressive disorder, in full remission  Overview:  - well controlled  - continue current management plan   - patient encouraged to notify me with any changes    Orders:  -     sertraline (ZOLOFT) 100 MG tablet; Take 1 tablet (100 mg total) by mouth once daily.  Dispense: 90 tablet; Refill: 3    6. Generalized anxiety disorder  Overview:  - well controlled  - continue current management plan   - patient encouraged to notify me with any changes    Orders:  -     sertraline (ZOLOFT) 100 MG tablet; Take 1 tablet (100 mg total) by mouth once daily.  Dispense: 90 tablet; Refill: 3    7. Class 3 severe obesity due to excess calories with serious comorbidity and body mass index (BMI) of 45.0 to 49.9 in adult  Overview:  - discussed recommendation for diet and cardiovascular exercise  - counseling on lifestyle modifications for risk factor reduction  - counseling on management options            Vaccines recommended:  Influenza, COVID-19 booster and shingles    Follow-up in 10 months with labs or sooner if any concerns.    This note is dictated using the M*Modal Fluency Direct word recognition program. There are word recognition mistakes that are occasionally missed on review.    Dr. Leanna Galeano D.O.   Family Medicine

## 2023-05-25 DIAGNOSIS — D64.9 NORMOCYTIC ANEMIA: ICD-10-CM

## 2023-05-25 RX ORDER — FERROUS GLUCONATE 324(38)MG
1 TABLET ORAL
Qty: 90 TABLET | Refills: 3 | Status: SHIPPED | OUTPATIENT
Start: 2023-05-25 | End: 2023-06-20 | Stop reason: SDUPTHER

## 2023-05-25 NOTE — TELEPHONE ENCOUNTER
Refill Encounter    PCP Visits: Recent Visits  Date Type Provider Dept   03/30/23 Office Visit Leanna Galeano, DO [facility]   02/27/23 Office Visit Leanna Galeano, DO [facility]   02/01/23 Office Visit Leanna Galeano, DO [facility]   01/26/23 Office Visit Leanna Galeano, DO [facility]   Showing recent visits within past 360 days and meeting all other requirements  Future Appointments  Date Type Provider Dept   01/10/24 Appointment Leanna Galeano, DO [facility]   Showing future appointments within next 720 days and meeting all other requirements     Last 3 Blood Pressure:   BP Readings from Last 3 Encounters:   03/30/23 124/78   02/27/23 (!) 148/96   02/01/23 138/88     Preferred Pharmacy:   [facility]/pharmacy #[id number]  [place] - [address]  [address] [place] [address]  Phone: [phone] Fax: [phone]    [facility]/pharmacy #[id number] - [place], [place] - [address] [address]  Phone: [phone] Fax: [phone]    Requested RX:  Requested Prescriptions     Pending Prescriptions Disp Refills    ferrous gluconate (FERGON) 324 MG tablet [Pharmacy Med Name: FERROUS GLUCONATE 324 MG TAB] 90 tablet 0     Sig: TAKE 1 TABLET BY MOUTH DAILY WITH BREAKFAST      RX Route: Normal

## 2023-06-20 ENCOUNTER — PATIENT MESSAGE (OUTPATIENT)
Dept: RHEUMATOLOGY | Facility: CLINIC | Age: 53
End: 2023-06-20
Payer: COMMERCIAL

## 2023-06-20 DIAGNOSIS — D64.9 NORMOCYTIC ANEMIA: ICD-10-CM

## 2023-06-20 DIAGNOSIS — M15.9 PRIMARY OSTEOARTHRITIS INVOLVING MULTIPLE JOINTS: ICD-10-CM

## 2023-06-20 RX ORDER — MELOXICAM 15 MG/1
15 TABLET ORAL DAILY
Qty: 90 TABLET | Refills: 0 | Status: SHIPPED | OUTPATIENT
Start: 2023-06-20 | End: 2023-06-22 | Stop reason: SDUPTHER

## 2023-06-20 RX ORDER — FERROUS GLUCONATE 324(38)MG
1 TABLET ORAL
Qty: 90 TABLET | Refills: 3 | Status: SHIPPED | OUTPATIENT
Start: 2023-06-20 | End: 2024-06-19

## 2023-06-22 ENCOUNTER — PATIENT MESSAGE (OUTPATIENT)
Dept: RHEUMATOLOGY | Facility: CLINIC | Age: 53
End: 2023-06-22
Payer: COMMERCIAL

## 2023-06-22 DIAGNOSIS — M15.9 PRIMARY OSTEOARTHRITIS INVOLVING MULTIPLE JOINTS: ICD-10-CM

## 2023-06-22 RX ORDER — MELOXICAM 15 MG/1
15 TABLET ORAL DAILY
Qty: 90 TABLET | Refills: 0 | Status: SHIPPED | OUTPATIENT
Start: 2023-06-22 | End: 2023-10-30 | Stop reason: SDUPTHER

## 2023-08-18 ENCOUNTER — TELEPHONE (OUTPATIENT)
Dept: PULMONOLOGY | Facility: CLINIC | Age: 53
End: 2023-08-18
Payer: COMMERCIAL

## 2023-08-18 ENCOUNTER — OFFICE VISIT (OUTPATIENT)
Dept: URGENT CARE | Facility: CLINIC | Age: 53
End: 2023-08-18
Payer: COMMERCIAL

## 2023-08-18 VITALS
RESPIRATION RATE: 18 BRPM | WEIGHT: 280 LBS | BODY MASS INDEX: 46.65 KG/M2 | OXYGEN SATURATION: 97 % | SYSTOLIC BLOOD PRESSURE: 140 MMHG | HEIGHT: 65 IN | TEMPERATURE: 98 F | HEART RATE: 76 BPM | DIASTOLIC BLOOD PRESSURE: 82 MMHG

## 2023-08-18 DIAGNOSIS — H69.91 DYSFUNCTION OF RIGHT EUSTACHIAN TUBE: ICD-10-CM

## 2023-08-18 DIAGNOSIS — R09.81 STUFFY NOSE: ICD-10-CM

## 2023-08-18 DIAGNOSIS — I10 ELEVATED BLOOD PRESSURE READING WITH DIAGNOSIS OF HYPERTENSION: ICD-10-CM

## 2023-08-18 DIAGNOSIS — J31.0 CHRONIC RHINITIS: ICD-10-CM

## 2023-08-18 DIAGNOSIS — G47.33 OSA (OBSTRUCTIVE SLEEP APNEA): ICD-10-CM

## 2023-08-18 DIAGNOSIS — R42 DIZZINESS: Primary | ICD-10-CM

## 2023-08-18 DIAGNOSIS — I10 ESSENTIAL HYPERTENSION: ICD-10-CM

## 2023-08-18 DIAGNOSIS — E55.9 VITAMIN D DEFICIENCY: ICD-10-CM

## 2023-08-18 LAB
CTP QC/QA: YES
HGB, POC: 11.4 G/DL (ref 12–15)
SARS-COV-2 AG RESP QL IA.RAPID: NEGATIVE

## 2023-08-18 PROCEDURE — 85018 HEMOGLOBIN: CPT | Mod: QW,S$GLB,, | Performed by: FAMILY MEDICINE

## 2023-08-18 PROCEDURE — 85018 POCT HEMOGLOBIN: ICD-10-PCS | Mod: QW,S$GLB,, | Performed by: FAMILY MEDICINE

## 2023-08-18 PROCEDURE — 99214 OFFICE O/P EST MOD 30 MIN: CPT | Mod: S$GLB,,, | Performed by: FAMILY MEDICINE

## 2023-08-18 PROCEDURE — 87811 SARS-COV-2 COVID19 W/OPTIC: CPT | Mod: QW,S$GLB,, | Performed by: FAMILY MEDICINE

## 2023-08-18 PROCEDURE — 99214 PR OFFICE/OUTPT VISIT, EST, LEVL IV, 30-39 MIN: ICD-10-PCS | Mod: S$GLB,,, | Performed by: FAMILY MEDICINE

## 2023-08-18 PROCEDURE — 87811 SARS CORONAVIRUS 2 ANTIGEN POCT, MANUAL READ: ICD-10-PCS | Mod: QW,S$GLB,, | Performed by: FAMILY MEDICINE

## 2023-08-18 RX ORDER — PREDNISONE 20 MG/1
20 TABLET ORAL 2 TIMES DAILY
Qty: 6 TABLET | Refills: 0 | Status: SHIPPED | OUTPATIENT
Start: 2023-08-18 | End: 2023-08-21

## 2023-08-18 RX ORDER — FLUTICASONE PROPIONATE 50 MCG
2 SPRAY, SUSPENSION (ML) NASAL DAILY
Qty: 16 G | Refills: 1 | Status: SHIPPED | OUTPATIENT
Start: 2023-08-18 | End: 2023-09-17

## 2023-08-18 RX ORDER — FLUTICASONE PROPIONATE 50 MCG
2 SPRAY, SUSPENSION (ML) NASAL DAILY
Qty: 16 G | Refills: 1 | Status: SHIPPED | OUTPATIENT
Start: 2023-08-18 | End: 2023-08-18 | Stop reason: SDUPTHER

## 2023-08-18 NOTE — TELEPHONE ENCOUNTER
Spoke with patient.                ----- Message from Glenna Worthy MA sent at 8/18/2023  1:14 PM CDT -----  Isaias Pineda, CHAR [facility]oke [facility] Staff  Caller: Unspecified (Today, 12:56 PM)  Type: Patient Call Back     Who called: Self     What is the request in detail:pt. Had a urgent care visit and hasn't been feeling well and was told it could be from her not being on her machine.. she is asking if there is anyway she can be seen sooner ..     Can the clinic reply by [facility]?No     Would the patient rather a call back or a response via [facility]? Yes, call     Best call back number: [phone] (home)

## 2023-08-18 NOTE — PROGRESS NOTES
"Subjective:      Patient ID: Criss Link is a 53 y.o. female.    Vitals:  height is 5' 5" (1.651 m) and weight is 127 kg (280 lb). Her oral temperature is 97.8 °F (36.6 °C). Her blood pressure is 140/82 (abnormal) and her pulse is 76. Her respiration is 18 and oxygen saturation is 97%.     Chief Complaint: Other Misc (Entered by patient) and Dizziness    Patient present  with dizziness and light headed and this has been happening for 3 days patient took a  benadryl  , Patient was out of one of pressure medications for  a week and half and has not taking. Also out of cpap supplies and so not able to use her cpap.     Dizziness:   Chronicity:  New  Onset: 3 days.  Progression since onset:  Gradually worsening  Pain Scale:  0/10  Severity:  Mild  Duration:  Very brief  Dizziness characteristics:  Spinning inside head only   Associated symptoms: ear congestion, headaches and light-headedness.no hearing loss, no ear pain, no fever, no tinnitus, no nausea, no vomiting, no diaphoresis, no aural fullness, no weakness, no visual disturbances, no syncope, no palpitations, no panic, no facial weakness, no slurred speech, no numbness in extremities and no chest pain.  Aggravated by:  Nothing  Treatments tried:  Nothing      Constitution: Negative for sweating and fever.   HENT:  Negative for ear pain, tinnitus and hearing loss.    Cardiovascular:  Negative for chest pain, palpitations and passing out.   Respiratory:  Negative for chest tightness.    Gastrointestinal:  Negative for abdominal pain, nausea, vomiting, bright red blood in stool and dark colored stools.   Neurological:  Positive for dizziness, light-headedness and headaches.      Objective:     Physical Exam   Constitutional: She is oriented to person, place, and time. She appears well-developed. She is cooperative.   HENT:   Head: Normocephalic and atraumatic.   Ears:   Right Ear: Hearing, external ear and ear canal normal.   Left Ear: Hearing, tympanic membrane, " external ear and ear canal normal.      Comments: Rt tm is dull   Nose: Congestion present. No mucosal edema or nasal deformity. No epistaxis. Right sinus exhibits no maxillary sinus tenderness and no frontal sinus tenderness. Left sinus exhibits no maxillary sinus tenderness and no frontal sinus tenderness.   Mouth/Throat: Uvula is midline, oropharynx is clear and moist and mucous membranes are normal. Mucous membranes are moist. No trismus in the jaw. Normal dentition. No uvula swelling. Oropharynx is clear.      Comments: Mallampanti score 3-4  Eyes: Conjunctivae and lids are normal.   Neck: Trachea normal and phonation normal. Neck supple.   Cardiovascular: Normal rate, regular rhythm, normal heart sounds and normal pulses.   Pulmonary/Chest: Effort normal and breath sounds normal.   Abdominal: Normal appearance and bowel sounds are normal. Soft.   Musculoskeletal: Normal range of motion.         General: Normal range of motion.   Neurological: no focal deficit. She is alert and oriented to person, place, and time. She displays no weakness and normal reflexes. She exhibits normal muscle tone. Coordination and gait normal.      Comments: [place]-hallpike maneuvers negative bilaterally   Skin: Skin is warm, dry and intact.   Psychiatric: Her speech is normal and behavior is normal. Judgment and thought content normal.   Nursing note and vitals reviewed.    Normal orthostatic BPs   Hemoglobin stable 11.4    Assessment:     1. Dizziness    2. Dysfunction of right eustachian tube    3. Elevated blood pressure reading with diagnosis of hypertension    4. Stuffy nose    5. Chronic rhinitis    6. Essential hypertension    7. Vitamin D deficiency    8. CATE (obstructive sleep apnea)        Plan:       Dizziness  -     SARS Coronavirus 2 Antigen, POCT Manual Read  -     Orthostatic vital signs  -     Cancel: HEMATOCRIT  -     POCT HEMOGLOBIN    Stuffy nose  -     fluticasone propionate (FLONASE) 50 mcg/actuation nasal spray; 2  sprays (100 mcg total) by Each Nostril route once daily.  Dispense: 16 g; Refill: 1    Elevated blood pressure reading with diagnosis of hypertension  Resume bp meds    Chronic rhinitis    Essential hypertension    Vitamin D deficiency  Follow with PCP    Pt or guardian provided educational materials and instructions regarding their visit diagnosis.

## 2023-09-14 ENCOUNTER — OFFICE VISIT (OUTPATIENT)
Dept: PULMONOLOGY | Facility: CLINIC | Age: 53
End: 2023-09-14
Payer: COMMERCIAL

## 2023-09-14 VITALS
SYSTOLIC BLOOD PRESSURE: 151 MMHG | BODY MASS INDEX: 48.19 KG/M2 | HEART RATE: 77 BPM | WEIGHT: 289.25 LBS | OXYGEN SATURATION: 97 % | HEIGHT: 65 IN | DIASTOLIC BLOOD PRESSURE: 75 MMHG

## 2023-09-14 DIAGNOSIS — G47.33 OSA (OBSTRUCTIVE SLEEP APNEA): Primary | ICD-10-CM

## 2023-09-14 DIAGNOSIS — E66.01 CLASS 3 SEVERE OBESITY DUE TO EXCESS CALORIES WITH SERIOUS COMORBIDITY AND BODY MASS INDEX (BMI) OF 45.0 TO 49.9 IN ADULT: ICD-10-CM

## 2023-09-14 PROCEDURE — 99213 OFFICE O/P EST LOW 20 MIN: CPT | Mod: S$GLB,,, | Performed by: INTERNAL MEDICINE

## 2023-09-14 PROCEDURE — 3044F HG A1C LEVEL LT 7.0%: CPT | Mod: CPTII,S$GLB,, | Performed by: INTERNAL MEDICINE

## 2023-09-14 PROCEDURE — 3078F DIAST BP <80 MM HG: CPT | Mod: CPTII,S$GLB,, | Performed by: INTERNAL MEDICINE

## 2023-09-14 PROCEDURE — 3077F PR MOST RECENT SYSTOLIC BLOOD PRESSURE >= 140 MM HG: ICD-10-PCS | Mod: CPTII,S$GLB,, | Performed by: INTERNAL MEDICINE

## 2023-09-14 PROCEDURE — 3077F SYST BP >= 140 MM HG: CPT | Mod: CPTII,S$GLB,, | Performed by: INTERNAL MEDICINE

## 2023-09-14 PROCEDURE — 99213 PR OFFICE/OUTPT VISIT, EST, LEVL III, 20-29 MIN: ICD-10-PCS | Mod: S$GLB,,, | Performed by: INTERNAL MEDICINE

## 2023-09-14 PROCEDURE — 1159F MED LIST DOCD IN RCRD: CPT | Mod: CPTII,S$GLB,, | Performed by: INTERNAL MEDICINE

## 2023-09-14 PROCEDURE — 1159F PR MEDICATION LIST DOCUMENTED IN MEDICAL RECORD: ICD-10-PCS | Mod: CPTII,S$GLB,, | Performed by: INTERNAL MEDICINE

## 2023-09-14 PROCEDURE — 3044F PR MOST RECENT HEMOGLOBIN A1C LEVEL <7.0%: ICD-10-PCS | Mod: CPTII,S$GLB,, | Performed by: INTERNAL MEDICINE

## 2023-09-14 PROCEDURE — 99999 PR PBB SHADOW E&M-EST. PATIENT-LVL III: CPT | Mod: PBBFAC,,, | Performed by: INTERNAL MEDICINE

## 2023-09-14 PROCEDURE — 3008F PR BODY MASS INDEX (BMI) DOCUMENTED: ICD-10-PCS | Mod: CPTII,S$GLB,, | Performed by: INTERNAL MEDICINE

## 2023-09-14 PROCEDURE — 3008F BODY MASS INDEX DOCD: CPT | Mod: CPTII,S$GLB,, | Performed by: INTERNAL MEDICINE

## 2023-09-14 PROCEDURE — 3078F PR MOST RECENT DIASTOLIC BLOOD PRESSURE < 80 MM HG: ICD-10-PCS | Mod: CPTII,S$GLB,, | Performed by: INTERNAL MEDICINE

## 2023-09-14 PROCEDURE — 99999 PR PBB SHADOW E&M-EST. PATIENT-LVL III: ICD-10-PCS | Mod: PBBFAC,,, | Performed by: INTERNAL MEDICINE

## 2023-09-14 NOTE — PROGRESS NOTES
Criss Link  was seen as a follow up.    CHIEF COMPLAINT:    Chief Complaint   Patient presents with    Apnea       HISTORY OF PRESENT ILLNESS: Criss Link is a 53 y.o. female is here for sleep evaluation.   Our first encounter was 4/8/21.  At that time, patient was seen by Dr. Mock for elevated intraoccular pressure.  Patient was recommended to have cate evaluaton by Dr. Mock. Patient with loud snoring.  +witnessed apnea.  Tire upon awake 3-4 times per week.  No parasomnia.  No cataplexy.  No rls symptoms.      [place] Sleepiness Scale score during initial sleep evaluation was 10.    S/p hsat 4/23/21 with ahi of 5 and rdi of 15.  Patient was set up with apap.  Patient is doing well with apap.  Sleep quality improve with apap.  Feeling rested upon awake.  No new issue.  Sleep unchanged.  Needing new equipments.    SLEEP ROUTINE:  Activity the hour prior to sleep:  [place]ath     Bed partner:  daughter  Time to bed:  8 pm   Lights off:  tv is on   Sleep onset latency:  30 minutes (watch tv until asleep)        Disruptions or awakenings:    none    Wakeup time:      5 am   Perceived sleep quality:  rested    Daytime naps:      none  Weekend sleep routine:      8 pm to 4:45 am   Caffeine use: 1 cup of coffee and energy drink  exercise habit:   none      PAST MEDICAL HISTORY:    Active Ambulatory Problems     Diagnosis Date Noted    Class 3 severe obesity due to excess calories with serious comorbidity and body mass index (BMI) of 45.0 to 49.9 in adult 01/30/2020    Essential hypertension 01/30/2020    Prediabetes 01/30/2020    Seasonal allergies 01/30/2020    Neovascular age-related macular degeneration 09/16/2020    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    Normocytic anemia 09/16/2020    CATE (obstructive sleep apnea) 04/08/2021    Vitamin D insufficiency 10/16/2021    Decreased range of motion (ROM) of left knee 06/29/2022    Weakness of left lower extremity 06/29/2022    Generalized anxiety  disorder 01/25/2023    Recurrent major depressive disorder, in full remission 01/25/2023    Hirsutism 01/26/2023    Trichimoniasis 01/26/2023    Chronic rhinitis 03/30/2023     Resolved Ambulatory Problems     Diagnosis Date Noted    Vaginal discharge 01/30/2020    Snoring 02/26/2021    Nasal congestion 04/08/2021    Hypokalemia 10/16/2021    COVID-19 02/01/2023    Pharyngitis 02/01/2023    Acute non-recurrent maxillary sinusitis 02/27/2023     Past Medical History:   Diagnosis Date    Allergies     Depression     Hypertension                 PAST SURGICAL HISTORY:    Past Surgical History:   Procedure Laterality Date    TUBAL LIGATION  12/30/2004    [facility]         FAMILY HISTORY:                Family History   Problem Relation Age of Onset    Heart disease Mother     Depression Mother     Hypertension Father     Diabetes Father     Kidney cancer Father     Cancer Father     No Known Problems Daughter     No Known Problems Son     Lymphoma Maternal Uncle     Lymphoma Maternal Grandmother     Cancer Maternal Grandmother        SOCIAL HISTORY:          Tobacco:   Social History     Tobacco Use   Smoking Status Never    Passive exposure: Never   Smokeless Tobacco Never       alcohol use:    Social History     Substance and Sexual Activity   Alcohol Use Yes    Alcohol/week: 2.0 standard drinks of alcohol    Types: 2 Drinks containing 0.5 oz of alcohol per week    Comment: Socially                 Occupation:  on [employer]    ALLERGIES:  Review of patient's allergies indicates:  No Known Allergies    CURRENT MEDICATIONS:    Current Outpatient Medications   Medication Sig Dispense Refill    amLODIPine (NORVASC) 5 MG tablet Take 1 tablet (5 mg total) by mouth once daily. 90 tablet 3    azelastine (ASTELIN) 137 mcg (0.1 %) nasal spray SPRAY 1 SPRAY (137 MCG TOTAL) BY NASAL ROUTE 2 (TWO) TIMES DAILY. 30 mL 5    cholecalciferol, vitamin D3, (VITAMIN D3) 50 mcg (2,000 unit) Tab Take 1 tablet (2,000 Units total) by  "mouth once daily. 90 tablet 3    dorzolamide (TRUSOPT) 2 % ophthalmic solution Place 1 drop into both eyes 2 (two) times daily.      ferrous gluconate (FERGON) 324 MG tablet Take 1 tablet (324 mg total) by mouth daily with breakfast. 90 tablet 3    fluticasone propionate (FLONASE) 50 mcg/actuation nasal spray 2 sprays (100 mcg total) by Each Nostril route once daily. 16 g 1    meloxicam (MOBIC) 15 MG tablet Take 1 tablet (15 mg total) by mouth once daily. 90 tablet 0    montelukast (SINGULAIR) 10 mg tablet Take 1 tablet (10 mg total) by mouth every evening. 90 tablet 3    sertraline (ZOLOFT) 100 MG tablet Take 1 tablet (100 mg total) by mouth once daily. 90 tablet 3    spironolactone (ALDACTONE) 50 MG tablet Take 1 tablet (50 mg total) by mouth once daily. 90 tablet 3    TWYNEO 0.1-3 % Crea Apply topically.       No current facility-administered medications for this visit.                  REVIEW OF SYSTEMS:   No acute changes from previous encounter dated 4/8/2021 with exceptions mentioned in HPI.             PHYSICAL EXAM:  Vitals:    09/14/23 1058   BP: (!) 151/75   Pulse: 77   SpO2: 97%   Weight: 131.2 kg (289 lb 3.9 oz)   Height: 5' 5" (1.651 m)   PainSc: 0-No pain     Body mass index is 48.13 kg/m².     GENERAL: Normal development, well groomed  HEENT:  Conjunctivae are non-erythematous; Pupils equal, round, and reactive to light; Nose is symmetrical; Nasal mucosa is pink and moist; Septum is midline; Inferior turbinates are normal; Nasal airflow is normal; Posterior pharynx is pink; Modified Mallampati: 3; Posterior palate is normal; Tonsils +2; Uvula is normal and pink;Tongue is normal; Dentition is fair; No TMJ tenderness; Jaw opening and protrusion without click and without discomfort.  NECK: Supple. Neck circumference is 17.5 inches. No thyromegaly. No palpable nodes.     SKIN: On face and neck: No abrasions, no rashes, no lesions.  No subcutaneous nodules are palpable.  RESPIRATORY: Chest is clear to " auscultation.  Normal chest expansion and non-labored breathing at rest.  CARDIOVASCULAR: Normal S1, S2.  No murmurs, gallops or rubs. No carotid bruits bilaterally.  EXTREMITIES: No edema. No clubbing. No cyanosis. Station normal. Gait normal.        NEURO/PSYCH: Oriented to time, place and person. Normal attention span and concentration. Affect is full. Mood is normal.                                              DATA    4/23/21 ahi of 5; rdi of 15    Echo 9/22/20  There is left ventricular concentric remodeling.  Normal left ventricular systolic function. The estimated ejection fraction is 60-65%.  Normal LV diastolic function.  Normal right ventricular systolic function.  The estimated PA systolic pressure is 23 mmHg.  No wall motion abnormalities.  Normal central venous pressure (3 mmHg).    Lab Results   Component Value Date    TSH 1.949 02/28/2020     ASSESSMENT/PLAN  Problem List Items Addressed This Visit       Class 3 severe obesity due to excess calories with serious comorbidity and body mass index (BMI) of 45.0 to 49.9 in adult    Overview     - discussed recommendation for diet and cardiovascular exercise  Follow up [place]         CATE (obstructive sleep apnea) - Primary    Overview     4/23/21 Sleep study: AHI of 5.  AutoPAP 5-15 cmH2O  Doing well and benefiting from apap.           Relevant Orders    CPAP/BIPAP SUPPLIES         Education: During our discussion today, we talked about the etiology of obstructive sleep apnea as well as the potential ramifications of untreated sleep apnea, which could include daytime sleepiness, hypertension, heart disease and/or stroke.     Precautions: The patient was advised to abstain from driving should they feel sleepy or drowsy.       Patient will No follow-ups on file. with md/np.    Please cc note to  No ref. provider found.

## 2023-09-18 ENCOUNTER — PATIENT MESSAGE (OUTPATIENT)
Dept: PULMONOLOGY | Facility: CLINIC | Age: 53
End: 2023-09-18
Payer: COMMERCIAL

## 2023-10-16 ENCOUNTER — OFFICE VISIT (OUTPATIENT)
Dept: FAMILY MEDICINE | Facility: CLINIC | Age: 53
End: 2023-10-16
Payer: COMMERCIAL

## 2023-10-16 VITALS
WEIGHT: 290.81 LBS | OXYGEN SATURATION: 95 % | TEMPERATURE: 98 F | SYSTOLIC BLOOD PRESSURE: 136 MMHG | BODY MASS INDEX: 48.45 KG/M2 | HEIGHT: 65 IN | HEART RATE: 66 BPM | DIASTOLIC BLOOD PRESSURE: 78 MMHG

## 2023-10-16 DIAGNOSIS — J06.9 URI WITH COUGH AND CONGESTION: Primary | ICD-10-CM

## 2023-10-16 PROBLEM — A59.9 TRICHIMONIASIS: Status: RESOLVED | Noted: 2023-01-26 | Resolved: 2023-10-16

## 2023-10-16 LAB — SARS-COV-2 RNA RESP QL NAA+PROBE: NOT DETECTED

## 2023-10-16 PROCEDURE — 3008F PR BODY MASS INDEX (BMI) DOCUMENTED: ICD-10-PCS | Mod: CPTII,S$GLB,, | Performed by: FAMILY MEDICINE

## 2023-10-16 PROCEDURE — 3008F BODY MASS INDEX DOCD: CPT | Mod: CPTII,S$GLB,, | Performed by: FAMILY MEDICINE

## 2023-10-16 PROCEDURE — 99999 PR PBB SHADOW E&M-EST. PATIENT-LVL IV: CPT | Mod: PBBFAC,,, | Performed by: FAMILY MEDICINE

## 2023-10-16 PROCEDURE — 3075F PR MOST RECENT SYSTOLIC BLOOD PRESS GE 130-139MM HG: ICD-10-PCS | Mod: CPTII,S$GLB,, | Performed by: FAMILY MEDICINE

## 2023-10-16 PROCEDURE — 1159F PR MEDICATION LIST DOCUMENTED IN MEDICAL RECORD: ICD-10-PCS | Mod: CPTII,S$GLB,, | Performed by: FAMILY MEDICINE

## 2023-10-16 PROCEDURE — 3075F SYST BP GE 130 - 139MM HG: CPT | Mod: CPTII,S$GLB,, | Performed by: FAMILY MEDICINE

## 2023-10-16 PROCEDURE — 87635 SARS-COV-2 COVID-19 AMP PRB: CPT | Performed by: FAMILY MEDICINE

## 2023-10-16 PROCEDURE — 99999 PR PBB SHADOW E&M-EST. PATIENT-LVL IV: ICD-10-PCS | Mod: PBBFAC,,, | Performed by: FAMILY MEDICINE

## 2023-10-16 PROCEDURE — 3044F PR MOST RECENT HEMOGLOBIN A1C LEVEL <7.0%: ICD-10-PCS | Mod: CPTII,S$GLB,, | Performed by: FAMILY MEDICINE

## 2023-10-16 PROCEDURE — 99214 OFFICE O/P EST MOD 30 MIN: CPT | Mod: S$GLB,,, | Performed by: FAMILY MEDICINE

## 2023-10-16 PROCEDURE — 99214 PR OFFICE/OUTPT VISIT, EST, LEVL IV, 30-39 MIN: ICD-10-PCS | Mod: S$GLB,,, | Performed by: FAMILY MEDICINE

## 2023-10-16 PROCEDURE — 1160F PR REVIEW ALL MEDS BY PRESCRIBER/CLIN PHARMACIST DOCUMENTED: ICD-10-PCS | Mod: CPTII,S$GLB,, | Performed by: FAMILY MEDICINE

## 2023-10-16 PROCEDURE — 3078F PR MOST RECENT DIASTOLIC BLOOD PRESSURE < 80 MM HG: ICD-10-PCS | Mod: CPTII,S$GLB,, | Performed by: FAMILY MEDICINE

## 2023-10-16 PROCEDURE — 1159F MED LIST DOCD IN RCRD: CPT | Mod: CPTII,S$GLB,, | Performed by: FAMILY MEDICINE

## 2023-10-16 PROCEDURE — 1160F RVW MEDS BY RX/DR IN RCRD: CPT | Mod: CPTII,S$GLB,, | Performed by: FAMILY MEDICINE

## 2023-10-16 PROCEDURE — 3044F HG A1C LEVEL LT 7.0%: CPT | Mod: CPTII,S$GLB,, | Performed by: FAMILY MEDICINE

## 2023-10-16 PROCEDURE — 3078F DIAST BP <80 MM HG: CPT | Mod: CPTII,S$GLB,, | Performed by: FAMILY MEDICINE

## 2023-10-16 NOTE — PROGRESS NOTES
Routine Office Visit    Patient Name: Criss Link    : 1970  MRN: [id number]    Subjective:  Criss is a 53 y.o. female who presents today for:    1. Body aches and cough  Patient presenting today with cough, body aches, and sinus congestion x 4 days.  No fevers or chills.  There has been decrease in taste, but not smell.  She took mucinex and coricidin HBP with some relief.  Her symptoms have improved, but she wanted to make sure she was good to go back to work.  She has had covid twice in the past and vaccinated x 2.  No other concerns at this time.     Past Medical History  Past Medical History:   Diagnosis Date    Allergies     Depression     Hypertension     Idiopathic polypoidal choroidal vasculopathy     Prediabetes        Past Surgical History  Past Surgical History:   Procedure Laterality Date    TUBAL LIGATION  2004    [facility]       Family History  Family History   Problem Relation Age of Onset    Heart disease Mother     Depression Mother     Hypertension Father     Diabetes Father     Kidney cancer Father     Cancer Father     No Known Problems Daughter     No Known Problems Son     Lymphoma Maternal Uncle     Lymphoma Maternal Grandmother     Cancer Maternal Grandmother        Social History  Social History     Socioeconomic History    Marital status:    Tobacco Use    Smoking status: Never     Passive exposure: Never    Smokeless tobacco: Never   Substance and Sexual Activity    Alcohol use: Yes     Alcohol/week: 2.0 standard drinks of alcohol     Types: 2 Drinks containing 0.5 oz of alcohol per week     Comment: Socially    Drug use: Never    Sexual activity: Yes     Partners: Male     Birth control/protection: Condom   Social History Narrative    . 1 son and 1 daughter ( daughter 17 yo). Mother and father live with her. Mother suffers from alcoholism. Sexually active    Tobacco: None    EtOH: 2 mixed drinks 3 times per week     Drug: None    Employment: Works at [employer] (Works alongside [employer])     Education: 11th grade      Lives with her mother and 2 teenage children     Social Determinants of Health     Financial Resource Strain: Low Risk  (10/15/2023)    Overall Financial Resource Strain (CARDIA)     Difficulty of Paying Living Expenses: Not hard at all   Food Insecurity: No Food Insecurity (10/15/2023)    Hunger Vital Sign     Worried About Running Out of Food in the Last Year: Never true     Ran Out of Food in the Last Year: Never true   Transportation Needs: No Transportation Needs (10/15/2023)    PRAPARE - Transportation     Lack of Transportation (Medical): No     Lack of Transportation (Non-Medical): No   Physical Activity: Inactive (10/15/2023)    Exercise Vital Sign     Days of Exercise per Week: 0 days     Minutes of Exercise per Session: 0 min   Stress: No Stress Concern Present (10/15/2023)    [place] [employer] - Occupational Stress Questionnaire     Feeling of Stress : Not at all   Social Connections: Unknown (10/15/2023)    Social Connection and Isolation Panel [NHANES]     Frequency of Communication with Friends and Family: Twice a week     Frequency of Social Gatherings with Friends and Family: Once a week     Active Member of Clubs or Organizations: No     Attends Club or Organization Meetings: Never     Marital Status:    Housing Stability: Unknown (10/15/2023)    Housing Stability Vital Sign     Unable to Pay for Housing in the Last Year: Patient refused     Number of Places Lived in the Last Year: 1     Unstable Housing in the Last Year: No       Current Medications  Current Outpatient Medications on File Prior to Visit   Medication Sig Dispense Refill    amLODIPine (NORVASC) 5 MG tablet Take 1 tablet (5 mg total) by mouth once daily. 90 tablet 3    azelastine (ASTELIN) 137 mcg (0.1 %) nasal spray SPRAY 1 SPRAY (137 MCG TOTAL) BY NASAL ROUTE 2 (TWO) TIMES DAILY. 30 mL 5    cholecalciferol, vitamin D3, (VITAMIN  "D3) 50 mcg (2,000 unit) Tab Take 1 tablet (2,000 Units total) by mouth once daily. 90 tablet 3    dorzolamide (TRUSOPT) 2 % ophthalmic solution Place 1 drop into both eyes 2 (two) times daily.      ferrous gluconate (FERGON) 324 MG tablet Take 1 tablet (324 mg total) by mouth daily with breakfast. 90 tablet 3    meloxicam (MOBIC) 15 MG tablet Take 1 tablet (15 mg total) by mouth once daily. 90 tablet 0    montelukast (SINGULAIR) 10 mg tablet Take 1 tablet (10 mg total) by mouth every evening. 90 tablet 3    sertraline (ZOLOFT) 100 MG tablet Take 1 tablet (100 mg total) by mouth once daily. 90 tablet 3    spironolactone (ALDACTONE) 50 MG tablet Take 1 tablet (50 mg total) by mouth once daily. 90 tablet 3    TWYNEO 0.1-3 % Crea Apply topically.       No current facility-administered medications on file prior to visit.       Allergies   Review of patient's allergies indicates:  No Known Allergies    Review of Systems (Pertinent positives)  Review of Systems   Constitutional:  Positive for malaise/fatigue. Negative for fever.   HENT:  Positive for congestion and sinus pain.    Eyes: Negative.    Respiratory: Negative.     Cardiovascular: Negative.    Gastrointestinal: Negative.    Genitourinary: Negative.    Musculoskeletal:  Positive for myalgias.   Skin: Negative.          /78 (BP Location: Left arm, Patient Position: Sitting, BP Method: Large (Manual))   Pulse 66   Temp 98.1 °F (36.7 °C) (Oral)   Ht 5' 5" (1.651 m)   Wt 131.9 kg (290 lb 12.6 oz)   SpO2 95%   BMI 48.39 kg/m²     GENERAL APPEARANCE: morbidly obese female in no apparent distress and well developed and well nourished  HEENT: PERRL, EOMI, Sclera clear, anicteric  NECK: normal, supple, no adenopathy, thyroid normal in size  RESPIRATORY: appears well, vitals normal, no respiratory distress, acyanotic, normal RR, chest clear, no wheezing, crepitations, rhonchi, normal symmetric air entry  HEART: regular rate and rhythm, S1, S2 normal, no murmur, " click, rub or gallop.    SKIN: no rashes, no wounds, no other lesions  PSYCH: Alert, oriented x 3, thought content appropriate, speech normal, pleasant and cooperative, good eye contact, well groomed    Assessment/Plan:  Criss Link is a 53 y.o. female who presents today for :    Criss was seen today for sinusitis.    Diagnoses and all orders for this visit:    URI with cough and congestion  -     COVID-19 Routine Screening  - Likely viral URI, so no antibiotics needed at this time  - Asked if she needed work note, but she stated no  - She is to continue mucinex and coricidin HBP for symptom relief  -  HTN stable today  - Should symptoms improve and the worsen again will give antibiotics  - Follow up with PCP for routine follow up      MD Taye Camara MD

## 2023-10-19 ENCOUNTER — PATIENT MESSAGE (OUTPATIENT)
Dept: FAMILY MEDICINE | Facility: CLINIC | Age: 53
End: 2023-10-19
Payer: COMMERCIAL

## 2023-10-19 DIAGNOSIS — B96.89 ACUTE BACTERIAL BRONCHITIS: Primary | ICD-10-CM

## 2023-10-19 DIAGNOSIS — J20.8 ACUTE BACTERIAL BRONCHITIS: Primary | ICD-10-CM

## 2023-10-19 RX ORDER — DOXYCYCLINE 100 MG/1
100 CAPSULE ORAL 2 TIMES DAILY
Qty: 20 CAPSULE | Refills: 0 | Status: SHIPPED | OUTPATIENT
Start: 2023-10-19 | End: 2023-10-30

## 2023-10-19 RX ORDER — METHYLPREDNISOLONE 4 MG/1
TABLET ORAL
Qty: 1 EACH | Refills: 0 | Status: SHIPPED | OUTPATIENT
Start: 2023-10-19 | End: 2024-01-10

## 2023-10-30 ENCOUNTER — OFFICE VISIT (OUTPATIENT)
Dept: RHEUMATOLOGY | Facility: CLINIC | Age: 53
End: 2023-10-30
Payer: COMMERCIAL

## 2023-10-30 VITALS
HEIGHT: 65 IN | BODY MASS INDEX: 48.23 KG/M2 | SYSTOLIC BLOOD PRESSURE: 140 MMHG | DIASTOLIC BLOOD PRESSURE: 80 MMHG | OXYGEN SATURATION: 96 % | WEIGHT: 289.5 LBS | HEART RATE: 73 BPM

## 2023-10-30 DIAGNOSIS — Z71.89 COUNSELING AND COORDINATION OF CARE: ICD-10-CM

## 2023-10-30 DIAGNOSIS — Z79.1 NSAID LONG-TERM USE: ICD-10-CM

## 2023-10-30 DIAGNOSIS — E66.01 MORBID OBESITY: ICD-10-CM

## 2023-10-30 DIAGNOSIS — M15.9 PRIMARY OSTEOARTHRITIS INVOLVING MULTIPLE JOINTS: Primary | ICD-10-CM

## 2023-10-30 PROCEDURE — 99999 PR PBB SHADOW E&M-EST. PATIENT-LVL III: CPT | Mod: PBBFAC,,, | Performed by: INTERNAL MEDICINE

## 2023-10-30 PROCEDURE — 3079F DIAST BP 80-89 MM HG: CPT | Mod: CPTII,S$GLB,, | Performed by: INTERNAL MEDICINE

## 2023-10-30 PROCEDURE — 1159F MED LIST DOCD IN RCRD: CPT | Mod: CPTII,S$GLB,, | Performed by: INTERNAL MEDICINE

## 2023-10-30 PROCEDURE — 3008F BODY MASS INDEX DOCD: CPT | Mod: CPTII,S$GLB,, | Performed by: INTERNAL MEDICINE

## 2023-10-30 PROCEDURE — 3077F PR MOST RECENT SYSTOLIC BLOOD PRESSURE >= 140 MM HG: ICD-10-PCS | Mod: CPTII,S$GLB,, | Performed by: INTERNAL MEDICINE

## 2023-10-30 PROCEDURE — 99999 PR PBB SHADOW E&M-EST. PATIENT-LVL III: ICD-10-PCS | Mod: PBBFAC,,, | Performed by: INTERNAL MEDICINE

## 2023-10-30 PROCEDURE — 3077F SYST BP >= 140 MM HG: CPT | Mod: CPTII,S$GLB,, | Performed by: INTERNAL MEDICINE

## 2023-10-30 PROCEDURE — 3079F PR MOST RECENT DIASTOLIC BLOOD PRESSURE 80-89 MM HG: ICD-10-PCS | Mod: CPTII,S$GLB,, | Performed by: INTERNAL MEDICINE

## 2023-10-30 PROCEDURE — 99214 OFFICE O/P EST MOD 30 MIN: CPT | Mod: S$GLB,,, | Performed by: INTERNAL MEDICINE

## 2023-10-30 PROCEDURE — 99214 PR OFFICE/OUTPT VISIT, EST, LEVL IV, 30-39 MIN: ICD-10-PCS | Mod: S$GLB,,, | Performed by: INTERNAL MEDICINE

## 2023-10-30 PROCEDURE — 1159F PR MEDICATION LIST DOCUMENTED IN MEDICAL RECORD: ICD-10-PCS | Mod: CPTII,S$GLB,, | Performed by: INTERNAL MEDICINE

## 2023-10-30 PROCEDURE — 3008F PR BODY MASS INDEX (BMI) DOCUMENTED: ICD-10-PCS | Mod: CPTII,S$GLB,, | Performed by: INTERNAL MEDICINE

## 2023-10-30 PROCEDURE — 3044F PR MOST RECENT HEMOGLOBIN A1C LEVEL <7.0%: ICD-10-PCS | Mod: CPTII,S$GLB,, | Performed by: INTERNAL MEDICINE

## 2023-10-30 PROCEDURE — 3044F HG A1C LEVEL LT 7.0%: CPT | Mod: CPTII,S$GLB,, | Performed by: INTERNAL MEDICINE

## 2023-10-30 RX ORDER — MELOXICAM 15 MG/1
15 TABLET ORAL DAILY
Qty: 90 TABLET | Refills: 3 | Status: SHIPPED | OUTPATIENT
Start: 2023-10-30

## 2023-10-30 NOTE — PROGRESS NOTES
Answers submitted by the patient for this visit:  Rheumatology Questionnaire (Submitted on 10/27/2023)  fever: No  eye redness: No  mouth sores: No  headaches: No  shortness of breath: No  chest pain: No  trouble swallowing: No  diarrhea: No  constipation: No  unexpected weight change: No  genital sore: No  dysuria: No  During the last 3 days, have you had a skin rash?: No  Bruises or bleeds easily: No  cough: No             RHEUMATOLOGY OUTPATIENT CLINIC NOTE    10/30/2023    Attending Rheumatologist: Dr. Raciel Thayer MD  Primary Care Provider: Leanna Galeano DO   Physician Requesting Consultation: No referring provider defined for this encounter.  Chief Complaint/Reason For Consultation:  Osteoarthritis        Subjective:       HPI  Criss Link is a 53 y.o. Black or  female with medical history noted below presents for left knee pain.  Patient seen in ED last night and advised to come to Rheumatology. Patient notes about 2 weeks ago the start of left knee pain. No trauma. It has been progressing. She has been trying Tylenol, NSAIDs, and elevation with no relief. Activity makes it worse. Patient notes prior knee pain and trauma as a child but never persistent pain or swelling. Minimal morning stiffness. Patient notes having a lot star bucks (dairy) and seafood, and believes this may have triggered. She has had prior episodes where her elbow swell and becomes painful, diet triggered. +FHX of gout in her mom. No Hx of kidney stones. No new sexual partners, did have vaginal discharge in 5/2021.     Today  Patient here for follow up.   Last visit NSAIDs continued and given CSI to knee. She notes injection helped, reports as long as she takes NSAIDs pain and swelling controlled. Tolerating meds.    Review of Systems   Constitutional:  Negative for chills, fatigue, fever and unexpected weight change.   HENT:  Negative for mouth sores and trouble swallowing.    Eyes:  Negative for redness and eye  dryness.   Respiratory:  Negative for cough and shortness of breath.    Cardiovascular:  Negative for chest pain.   Gastrointestinal:  Negative for abdominal distention, constipation, diarrhea, nausea and vomiting.   Genitourinary:  Negative for dysuria, genital sores and vaginal dryness.   Musculoskeletal:  Positive for arthralgias and joint swelling. Negative for back pain, gait problem, leg pain, myalgias, neck pain, neck stiffness and joint deformity.   Integumentary:  Negative for rash.   Neurological:  Negative for weakness, numbness and headaches.   Hematological:  Negative for adenopathy. Does not bruise/bleed easily.   Psychiatric/Behavioral:  Negative for confusion, decreased concentration and sleep disturbance. The patient is not nervous/anxious.    All other systems reviewed and are negative.       Chronic comorbid conditions affecting medical decision making today:  Past Medical History:   Diagnosis Date    Allergies     Depression     Hypertension     Idiopathic polypoidal choroidal vasculopathy     Prediabetes      Past Surgical History:   Procedure Laterality Date    TUBAL LIGATION  12/30/2004    [facility]     Family History   Problem Relation Age of Onset    Heart disease Mother     Depression Mother     Hypertension Father     Diabetes Father     Kidney cancer Father     Cancer Father     No Known Problems Daughter     No Known Problems Son     Lymphoma Maternal Uncle     Lymphoma Maternal Grandmother     Cancer Maternal Grandmother      Social History     Substance and Sexual Activity   Alcohol Use Yes    Alcohol/week: 2.0 standard drinks of alcohol    Types: 2 Drinks containing 0.5 oz of alcohol per week    Comment: Socially     Social History     Tobacco Use   Smoking Status Never    Passive exposure: Never   Smokeless Tobacco Never     Social History     Substance and Sexual Activity   Drug Use Never       Current Outpatient Medications:     amLODIPine (NORVASC) 5 MG tablet, Take 1 tablet (5 mg  total) by mouth once daily., Disp: 90 tablet, Rfl: 3    azelastine (ASTELIN) 137 mcg (0.1 %) nasal spray, SPRAY 1 SPRAY (137 MCG TOTAL) BY NASAL ROUTE 2 (TWO) TIMES DAILY., Disp: 30 mL, Rfl: 5    cholecalciferol, vitamin D3, (VITAMIN D3) 50 mcg (2,000 unit) Tab, Take 1 tablet (2,000 Units total) by mouth once daily., Disp: 90 tablet, Rfl: 3    dorzolamide (TRUSOPT) 2 % ophthalmic solution, Place 1 drop into both eyes 2 (two) times daily., Disp: , Rfl:     doxycycline (VIBRAMYCIN) 100 MG Cap, Take 1 capsule (100 mg total) by mouth 2 (two) times daily. for 10 days, Disp: 20 capsule, Rfl: 0    ferrous gluconate (FERGON) 324 MG tablet, Take 1 tablet (324 mg total) by mouth daily with breakfast., Disp: 90 tablet, Rfl: 3    montelukast (SINGULAIR) 10 mg tablet, Take 1 tablet (10 mg total) by mouth every evening., Disp: 90 tablet, Rfl: 3    sertraline (ZOLOFT) 100 MG tablet, Take 1 tablet (100 mg total) by mouth once daily., Disp: 90 tablet, Rfl: 3    spironolactone (ALDACTONE) 50 MG tablet, Take 1 tablet (50 mg total) by mouth once daily., Disp: 90 tablet, Rfl: 3    TWYNEO 0.1-3 % Crea, Apply topically., Disp: , Rfl:     meloxicam (MOBIC) 15 MG tablet, Take 1 tablet (15 mg total) by mouth once daily., Disp: 90 tablet, Rfl: 3    methylPREDNISolone (MEDROL DOSEPACK) 4 mg tablet, use as directed (Patient not taking: Reported on 10/30/2023), Disp: 1 each, Rfl: 0     Objective:         Vitals:    10/30/23 1457   BP: (!) 140/80   Pulse: 73     Physical Exam  Knee crepitus, good ROM, no effusion or warmth     Reviewed old and all outside pertinent medical records available.    All lab results personally reviewed and interpreted by me.  Lab Results   Component Value Date    WBC 8.46 01/30/2023    HGB 11.4 (A) 08/18/2023    HCT 36.6 (L) 01/30/2023    MCV 87 01/30/2023    MCH 26.5 (L) 01/30/2023    MCHC 30.6 (L) 01/30/2023    RDW 15.5 (H) 01/30/2023     01/30/2023    MPV 11.0 01/30/2023       Lab Results   Component Value  "Date     01/30/2023    K 3.6 01/30/2023     01/30/2023    CO2 25 01/30/2023    GLU 99 01/30/2023    BUN 12 01/30/2023    CALCIUM 8.8 01/30/2023    PROT 7.0 01/30/2023    ALBUMIN 3.4 (L) 01/30/2023    BILITOT 0.3 01/30/2023    AST 14 01/30/2023    ALKPHOS 72 01/30/2023    ALT 13 01/30/2023       No results found for: "COLORU", "APPEARANCEUA", "SPECGRAV", "PHUR", "PHUA", "PROTEINUA", "GLUCOSEU", "KETONESU", "BLOODU", "LEUKOCYTESUR", "NITRITE", "UROBILINOGEN"    Lab Results   Component Value Date    CRP 15.2 (H) 11/12/2021       Lab Results   Component Value Date    SEDRATE 15 11/12/2021       Lab Results   Component Value Date    SEDRATE 15 11/12/2021       No components found for: "25OHVITDTOT", "63FXXAHX6", "42YLKAGI0", "METHODNOTE"    Lab Results   Component Value Date    URICACID 5.2 11/12/2021       No components found for: "TSPOTTB"        Imaging:  All imaging reviewed and independently interpreted by me.         ASSESSMENT / PLAN:     Criss Link is a 53 y.o. Black or  female with:      1. Osteoarthritis   - chronic   - continue Meloxicam  - wt loss  - reassurance & exercise     2. Chronic NSAID use  - no history of GI bleed or coronary artery disease.  - previous labs without features of CKD.  - clinical significance side effect of long-term NSAID use discussed in detail.  - recommended for alternative therapies for pain management.    3. Other specified counseling  - over 10 minutes spent regarding below topics:  - Immunization counseling done.  - Weight loss counseling done.  - Nutrition and exercise counseling.  - Limitation of alcohol consumption.  - Regular exercise:  Aerobic and resistance.  - Medication counseling provided.    4. Morbid Obesity  - would benefit from decreasing at least 10% of body weight.  - recommended goal of losing 1 lb per week.  - consider nutritionist evaluation.      Follow up in about 6 months (around 4/30/2024).    Method of contact with patient " concerns: [facility]    Disclaimer:  This note is prepared using voice recognition software and as such is likely to have errors and has not been proof read. Please contact me for questions.     Time spent: 30 minutes in face to face discussion concerning diagnosis, prognosis, review of lab and test results, benefits of treatment as well as management of disease, counseling of patient and coordination of care between various health care providers.  Greater than half the time spent was used for coordination of care and counseling of patient.    Raciel Thayer M.D.  Rheumatology Department   [facility]

## 2024-01-08 ENCOUNTER — LAB VISIT (OUTPATIENT)
Dept: LAB | Facility: OTHER | Age: 54
End: 2024-01-08
Attending: FAMILY MEDICINE
Payer: COMMERCIAL

## 2024-01-08 DIAGNOSIS — I10 ESSENTIAL HYPERTENSION: ICD-10-CM

## 2024-01-08 DIAGNOSIS — R73.03 PREDIABETES: ICD-10-CM

## 2024-01-08 LAB
ALBUMIN SERPL BCP-MCNC: 4.1 G/DL (ref 3.5–5.2)
ALP SERPL-CCNC: 91 U/L (ref 55–135)
ALT SERPL W/O P-5'-P-CCNC: 27 U/L (ref 10–44)
ANION GAP SERPL CALC-SCNC: 11 MMOL/L (ref 8–16)
AST SERPL-CCNC: 17 U/L (ref 10–40)
BILIRUB SERPL-MCNC: 0.4 MG/DL (ref 0.1–1)
BUN SERPL-MCNC: 13 MG/DL (ref 6–20)
CALCIUM SERPL-MCNC: 9.7 MG/DL (ref 8.7–10.5)
CHLORIDE SERPL-SCNC: 102 MMOL/L (ref 95–110)
CHOLEST SERPL-MCNC: 246 MG/DL (ref 120–199)
CHOLEST/HDLC SERPL: 2.7 {RATIO} (ref 2–5)
CO2 SERPL-SCNC: 27 MMOL/L (ref 23–29)
CREAT SERPL-MCNC: 0.8 MG/DL (ref 0.5–1.4)
ERYTHROCYTE [DISTWIDTH] IN BLOOD BY AUTOMATED COUNT: 13.3 % (ref 11.5–14.5)
EST. GFR  (NO RACE VARIABLE): >60 ML/MIN/1.73 M^2
ESTIMATED AVG GLUCOSE: 123 MG/DL (ref 68–131)
GLUCOSE SERPL-MCNC: 110 MG/DL (ref 70–110)
HBA1C MFR BLD: 5.9 % (ref 4–5.6)
HCT VFR BLD AUTO: 43.6 % (ref 37–48.5)
HDLC SERPL-MCNC: 92 MG/DL (ref 40–75)
HDLC SERPL: 37.4 % (ref 20–50)
HGB BLD-MCNC: 14.1 G/DL (ref 12–16)
LDLC SERPL CALC-MCNC: 138 MG/DL (ref 63–159)
MCH RBC QN AUTO: 29.6 PG (ref 27–31)
MCHC RBC AUTO-ENTMCNC: 32.3 G/DL (ref 32–36)
MCV RBC AUTO: 92 FL (ref 82–98)
NONHDLC SERPL-MCNC: 154 MG/DL
PLATELET # BLD AUTO: 399 K/UL (ref 150–450)
PMV BLD AUTO: 9.4 FL (ref 9.2–12.9)
POTASSIUM SERPL-SCNC: 4.1 MMOL/L (ref 3.5–5.1)
PROT SERPL-MCNC: 7.8 G/DL (ref 6–8.4)
RBC # BLD AUTO: 4.76 M/UL (ref 4–5.4)
SODIUM SERPL-SCNC: 140 MMOL/L (ref 136–145)
TRIGL SERPL-MCNC: 80 MG/DL (ref 30–150)
WBC # BLD AUTO: 7.35 K/UL (ref 3.9–12.7)

## 2024-01-08 PROCEDURE — 85027 COMPLETE CBC AUTOMATED: CPT | Performed by: FAMILY MEDICINE

## 2024-01-08 PROCEDURE — 36415 COLL VENOUS BLD VENIPUNCTURE: CPT | Performed by: FAMILY MEDICINE

## 2024-01-08 PROCEDURE — 80061 LIPID PANEL: CPT | Performed by: FAMILY MEDICINE

## 2024-01-08 PROCEDURE — 83036 HEMOGLOBIN GLYCOSYLATED A1C: CPT | Performed by: FAMILY MEDICINE

## 2024-01-08 PROCEDURE — 80053 COMPREHEN METABOLIC PANEL: CPT | Performed by: FAMILY MEDICINE

## 2024-01-09 NOTE — PROGRESS NOTES
FAMILY MEDICINE  [facility]    Reason for visit:   Chief Complaint   Patient presents with    Annual Exam         SUBJECTIVE: Criss Link is a 53 y.o. female  - with obesity, hypertension, prediabetes, hyperlipidemia, anxiety, depression and CATE presents for her annual physical to follow up blood pressure, anxiety depression and she would also like to discuss weight.  She also reports some numbness and tingling in her hands and back that she would like addressed    Gynecology Dr. Buddy Shaffer MD  Rheumatology Dr. Raciel Thayer MD  - for chronic CATE    She has still not completed her Cologuard which was ordered last year.  She would also like STD testing.      1. Hypertension  - Obesity  - BP goal < 140/90    Criss Link reports that she takes her blood pressure medications regularly.  She reports that she tolerates well.  She still has occasional lower extremity edema which she suspects is related with her weight.  She is previously on chlorthalidone however had recurrent hyponatremia.    Current medication treatment:   amLODIPine (NORVASC) 5 MG tablet, Take 1 tablet (5 mg total) by mouth once daily., Disp: 90 tablet, Rfl: 0  spironolactone (ALDACTONE) 50 MG tablet, Take 1 tablet (50 mg total) by mouth once daily., Disp: 90 tablet, Rfl: 0    Medication side effects:  denies    Prior medication:  Chlorthalidone-hyponatremia    Exercise regimen: not active    Home BP cuff: No  How often does patient monitoring BP? no    Lab Results       Component                Value               Date                       K                        4.1                 01/08/2024              2. Depression and Anxiety    Age diagnosed: 50 yo     Today 1/10/24: Criss Link reports that she has been doing well since back on sertraline 100 mg daily.  She reports helps manage her depression.  She continues to feel down about her weight when she has plans to focus on and has an appointment with the weight  loss specialist March 2024.  She feels that her mood is much better controlled on sertraline 100 mg daily and would like to continue it    Prior notes:    3/30/23:  She does have increased stressors with her daughter.  She reports her daughter is graduating high school this year.  She is 18 years old.  There have been some recent behavioral issues.  However she reports that she seems to be doing well with the sertraline 100 mg daily.  Reports that it seems to help her get through the day and less stress.  It helps her get to work.  2/27/23: she is on  Sertraline to 100 mg daily since 1/26.23 She reports that difficult to assess if any improvement since she got covid-19 and now a sinus infection and she is tired. No worsening. Stable  1/26/23:  She reports that she was initially diagnosed with depression anxiety last year.  She reports she has increased stressors.  She lives with her mother and father.  She reports that her mother suffers from alcoholism.  She also has 2 teenage children.  She reports her daughter has been causing her some increased stress recently.  She works at a job that is also high stress and demands concentration.  She is a  at the [employer].  She reports initially when she started sertraline that it was effective however she reports the last several months she has had difficulty focusing.  She has had some increased anxiety and has been more down.  She denies any thoughts of self-harm.    Panic attacks: denies  Hopelessness:  denies  Sleep issues:  denies  Suicidal thoughts:  denies  Thoughts of self harm: denies  Thoughts of harm to others:  denies  History of suicide attempts:  denies  Family history of suicide: denies    Psychiatrist: none  Psychologist: none  Counselor : none    Prior medication: denies    Current medications:   sertraline (ZOLOFT) 100 MG tablet, Take 1 tablet (100 mg total) by mouth once daily., Disp: 90 tablet, Rfl: 0    Side effects of current treatment:  not effective    Support system: family    3. Obesity    Today 1/10/24: Criss Link has struggled with her weight for several years.  She did go to the weight loss clinic last year and was started Ozempic.  She reports she did not continue the medication because she does have a needle phobia giving herself injections.  However she is reconsidering starting medications since she is fearful that she will need gastric bypass surgery.  She reports her mother has had gastric bypass surgery successfully in the past.  She struggles with her weight.  She has pain related with her weight which limits her ability to exercise.    Current weight:   299 lbs    Prior weight history:   Wt Readings from Last 10 Encounters:  01/10/24 : 135.7 kg (299 lb 4.4 oz)  10/30/23 : 131.3 kg (289 lb 8 oz)  10/16/23 : 131.9 kg (290 lb 12.6 oz)  09/14/23 : 131.2 kg (289 lb 3.9 oz)  08/18/23 : 127 kg (280 lb)  03/30/23 : 127.3 kg (280 lb 12.1 oz)  02/27/23 : 127 kg (279 lb 15.8 oz)  02/01/23 : 128.3 kg (282 lb 13.6 oz)  01/26/23 : 126.8 kg (279 lb 6.9 oz)  11/14/22 : 128 kg (282 lb 3 oz)    Lowest weight: 200 lbs   Goal weight: <200 lbs      Current diet:   Breakfast - starbuck: silveira egg bites and starbucks white chocolate mocha  Lunch salad with dressing   Current exercise: none 2/2 back pain and knee pains  Current daily activities: desk job    Prior weight loss program: ozempic    Plan of action: weight loss clinic    Medications for weight loss:  none    Medications that may impede weight loss:   none    Comorbidities:   HTN, carpal tunnel, back pain, joint pain    4. Bilateral hand numbness and tingling    Criss Link reports that she has had bilateral numbness and tingling of her hands.  She reports it is worse at night when she is sleeping.  It also seems to bother her at work.  She types at work.  She reports mainly it is her 1st-4th with the 4th most affected.  She reports that she was shake out her hands for the symptoms to  improve.  She denies any color changes.  She denies any weakness.  She denies any muscle wasting    5. Back pain    Criss Link reports that she fell and a [place] November 2023.  She reports that she was getting out of the tub and slipped.  She fell back landing on her upper mid and lower back.  She reports initially she only had minor discomfort.  However she reports since then she continues to have pain in her upper mid and lower back.  She denies any numbness or tingling, weakness, nausea or vomiting.  She reports the pain is a deep ache to grabbing pain.  She has not tried any over-the-counter medications.  She feels like something needs to pop.  She is concerned that she may have a fracture secondary to a fall.                Review of Systems   All other systems reviewed and are negative.      HEALTH MAINTENANCE:   Health Maintenance   Topic Date Due    Colorectal Cancer Screening  Never done    Shingles Vaccine (1 of 2) Never done    Mammogram  06/16/2024    Lipid Panel  01/08/2029    TETANUS VACCINE  01/30/2030    Hepatitis C Screening  Completed     Health Maintenance Topics with due status: Not Due       Topic Last Completion Date    TETANUS VACCINE 01/30/2020    Cervical Cancer Screening 01/30/2020    Mammogram 06/16/2022    Hemoglobin A1c (Prediabetes) 01/08/2024    Lipid Panel 01/08/2024     Health Maintenance Due   Topic Date Due    Colorectal Cancer Screening  Never done    Shingles Vaccine (1 of 2) Never done    Influenza Vaccine (1) 09/01/2023    COVID-19 Vaccine (3 - 2023-24 season) 09/01/2023       HISTORY:   Past Medical History:   Diagnosis Date    Allergies     Depression     Hypertension     Idiopathic polypoidal choroidal vasculopathy     Prediabetes        Past Surgical History:   Procedure Laterality Date    TUBAL LIGATION  12/30/2004    [facility]       Family History   Problem Relation Age of Onset    Heart disease Mother     Depression Mother     Hypertension Father     Diabetes Father      Kidney cancer Father     Cancer Father     No Known Problems Daughter     No Known Problems Son     Lymphoma Maternal Uncle     Lymphoma Maternal Grandmother     Cancer Maternal Grandmother        Social History     Tobacco Use    Smoking status: Never     Passive exposure: Never    Smokeless tobacco: Never   Substance Use Topics    Alcohol use: Yes     Alcohol/week: 2.0 standard drinks of alcohol     Types: 2 Drinks containing 0.5 oz of alcohol per week     Comment: Socially    Drug use: Never       Social History     Social History Narrative    . 1 son and 1 daughter (2023 daughter 19 yo). Mother and father live with her. Mother suffers from alcoholism. Sexually active    Tobacco: None    EtOH: 2 mixed drinks 3 times per week     Drug: None    Employment: Works at  [employer] (Works alongside [employer])     Education: 11th grade      Lives with her mother and 2 teenage children       ALLERGIES:   Review of patient's allergies indicates:  No Known Allergies    MEDS:   Current Outpatient Medications on File Prior to Visit   Medication Sig Dispense Refill Last Dose    azelastine (ASTELIN) 137 mcg (0.1 %) nasal spray SPRAY 1 SPRAY (137 MCG TOTAL) BY NASAL ROUTE 2 (TWO) TIMES DAILY. 30 mL 5 Taking    cholecalciferol, vitamin D3, (VITAMIN D3) 50 mcg (2,000 unit) Tab Take 1 tablet (2,000 Units total) by mouth once daily. 90 tablet 3 Taking    dorzolamide (TRUSOPT) 2 % ophthalmic solution Place 1 drop into both eyes 2 (two) times daily.   Taking    ferrous gluconate (FERGON) 324 MG tablet Take 1 tablet (324 mg total) by mouth daily with breakfast. 90 tablet 3 Taking    meloxicam (MOBIC) 15 MG tablet Take 1 tablet (15 mg total) by mouth once daily. 90 tablet 3 Taking    montelukast (SINGULAIR) 10 mg tablet Take 1 tablet (10 mg total) by mouth every evening. 90 tablet 3 Taking    [DISCONTINUED] amLODIPine (NORVASC) 5 MG tablet Take 1 tablet (5 mg total) by mouth once daily. 90 tablet 3 Taking     [DISCONTINUED] sertraline (ZOLOFT) 100 MG tablet Take 1 tablet (100 mg total) by mouth once daily. 90 tablet 3 Taking    [DISCONTINUED] spironolactone (ALDACTONE) 50 MG tablet Take 1 tablet (50 mg total) by mouth once daily. 90 tablet 3 Taking    [DISCONTINUED] methylPREDNISolone (MEDROL DOSEPACK) 4 mg tablet use as directed (Patient not taking: Reported on 10/30/2023) 1 each 0 Not Taking    [DISCONTINUED] TWYNEO 0.1-3 % Crea Apply topically.            Vital signs:   Vitals:    01/10/24 1529 01/10/24 1550   BP: 138/60 132/76   Pulse: 79    SpO2: 97%    Weight: 135.7 kg (299 lb 4.4 oz)      Body mass index is 49.8 kg/m².    PHYSICAL EXAM:     Physical Exam  Vitals reviewed.   Constitutional:       General: She is not in acute distress.  HENT:      Head: Normocephalic and atraumatic.      Right Ear: Tympanic membrane and ear canal normal.      Left Ear: Tympanic membrane and ear canal normal.   Eyes:      General: No scleral icterus.     Conjunctiva/sclera: Conjunctivae normal.   Neck:      Thyroid: No thyromegaly.      Vascular: No carotid bruit.   Cardiovascular:      Rate and Rhythm: Normal rate and regular rhythm.      Pulses: Normal pulses.      Heart sounds: Normal heart sounds. No murmur heard.     No friction rub. No gallop.   Pulmonary:      Effort: Pulmonary effort is normal.      Breath sounds: Normal breath sounds. No wheezing or rales.   Abdominal:      General: Bowel sounds are normal. There is no distension.      Palpations: Abdomen is soft.      Tenderness: There is no abdominal tenderness.   Musculoskeletal:      Right wrist: No swelling, tenderness or bony tenderness. Normal range of motion. Normal pulse.      Left wrist: No swelling, tenderness or bony tenderness. Normal range of motion. Normal pulse.      Right hand: No swelling, deformity, tenderness or bony tenderness. Normal range of motion. Normal strength. Normal sensation. Normal pulse.      Left hand: No swelling, deformity, tenderness or  bony tenderness. Normal range of motion. Normal strength. Normal sensation. Normal pulse.      Cervical back: Normal range of motion and neck supple.      Thoracic back: Spasms and tenderness present. No swelling or bony tenderness. Normal range of motion.      Lumbar back: Spasms and tenderness present. No bony tenderness. Normal range of motion. Negative right straight leg raise test and negative left straight leg raise test.      Right lower leg: No edema.      Left lower leg: No edema.   Lymphadenopathy:      Cervical: No cervical adenopathy.   Skin:     General: Skin is warm.      Capillary Refill: Capillary refill takes less than 2 seconds.   Neurological:      Mental Status: She is alert.             PERTINENT RESULTS:   Lab Visit on 01/08/2024   Component Date Value Ref Range Status    Sodium 01/08/2024 140  136 - 145 mmol/L Final    Potassium 01/08/2024 4.1  3.5 - 5.1 mmol/L Final    Chloride 01/08/2024 102  95 - 110 mmol/L Final    CO2 01/08/2024 27  23 - 29 mmol/L Final    Glucose 01/08/2024 110  70 - 110 mg/dL Final    BUN 01/08/2024 13  6 - 20 mg/dL Final    Creatinine 01/08/2024 0.8  0.5 - 1.4 mg/dL Final    Calcium 01/08/2024 9.7  8.7 - 10.5 mg/dL Final    Total Protein 01/08/2024 7.8  6.0 - 8.4 g/dL Final    Albumin 01/08/2024 4.1  3.5 - 5.2 g/dL Final    Total Bilirubin 01/08/2024 0.4  0.1 - 1.0 mg/dL Final    Comment: For infants and newborns, interpretation of results should be based  on gestational age, weight and in agreement with clinical  observations.    Premature Infant recommended reference ranges:  Up to 24 hours.............<8.0 mg/dL  Up to 48 hours............<12.0 mg/dL  3-5 days..................<15.0 mg/dL  6-29 days.................<15.0 mg/dL      Alkaline Phosphatase 01/08/2024 91  55 - 135 U/L Final    AST 01/08/2024 17  10 - 40 U/L Final    ALT 01/08/2024 27  10 - 44 U/L Final    eGFR 01/08/2024 >60  >60 mL/min/1.73 m^2 Final    Anion Gap 01/08/2024 11  8 - 16 mmol/L Final     WBC 01/08/2024 7.35  3.90 - 12.70 K/uL Final    RBC 01/08/2024 4.76  4.00 - 5.40 M/uL Final    Hemoglobin 01/08/2024 14.1  12.0 - 16.0 g/dL Final    Hematocrit 01/08/2024 43.6  37.0 - 48.5 % Final    MCV 01/08/2024 92  82 - 98 fL Final    MCH 01/08/2024 29.6  27.0 - 31.0 pg Final    MCHC 01/08/2024 32.3  32.0 - 36.0 g/dL Final    RDW 01/08/2024 13.3  11.5 - 14.5 % Final    Platelets 01/08/2024 399  150 - 450 K/uL Final    MPV 01/08/2024 9.4  9.2 - 12.9 fL Final    Cholesterol 01/08/2024 246 (H)  120 - 199 mg/dL Final    Comment: The National Cholesterol Education Program (NCEP) has set the  following guidelines (reference ranges) for Cholesterol:  Optimal.....................<200 mg/dL  Borderline High.............200-239 mg/dL  High........................> or = 240 mg/dL      Triglycerides 01/08/2024 80  30 - 150 mg/dL Final    Comment: The National Cholesterol Education Program (NCEP) has set the  following guidelines (reference values) for triglycerides:  Normal......................<150 mg/dL  Borderline High.............150-199 mg/dL  High........................200-499 mg/dL      HDL 01/08/2024 92 (H)  40 - 75 mg/dL Final    Comment: The National Cholesterol Education Program (NCEP) has set the  following guidelines (reference values) for HDL Cholesterol:  Low...............<40 mg/dL  Optimal...........>60 mg/dL      LDL Cholesterol 01/08/2024 138.0  63.0 - 159.0 mg/dL Final    Comment: The National Cholesterol Education Program (NCEP) has set the  following guidelines (reference values) for LDL Cholesterol:  Optimal.......................<130 mg/dL  Borderline High...............130-159 mg/dL  High..........................160-189 mg/dL  Very High.....................>190 mg/dL      HDL/Cholesterol Ratio 01/08/2024 37.4  20.0 - 50.0 % Final    Total Cholesterol/HDL Ratio 01/08/2024 2.7  2.0 - 5.0 Final    Non-HDL Cholesterol 01/08/2024 154  mg/dL Final    Comment: Risk category and Non-HDL cholesterol  goals:  Coronary heart disease (CHD)or equivalent (10-year risk of CHD >20%):  Non-HDL cholesterol goal     <130 mg/dL  Two or more CHD risk factors and 10-year risk of CHD <= 20%:  Non-HDL cholesterol goal     <160 mg/dL  0 to 1 CHD risk factor:  Non-HDL cholesterol goal     <190 mg/dL      Hemoglobin A1C 01/08/2024 5.9 (H)  4.0 - 5.6 % Final    Comment: ADA Screening Guidelines:  5.7-6.4%  Consistent with prediabetes  >or=6.5%  Consistent with diabetes    High levels of fetal hemoglobin interfere with the HbA1C  assay. Heterozygous hemoglobin variants (HbS, HgC, etc)do  not significantly interfere with this assay.   However, presence of multiple variants may affect accuracy.      Estimated Avg Glucose 01/08/2024 123  68 - 131 mg/dL Final         Component 3 yr ago   Final Pathologic Diagnosis Specimen Adequacy   Satisfactory for interpretation. Endocervical component is present.     [place] Category   Negative for intraepithelial lesion or malignancy.    Comment: Interpreted by: KELSY Whitt MT (ASCP), Signed on 02/23/2020 at    Disclaimer The Pap smear is a screening test that aids in the detection of cervical   cancer and cancer precursors. Both false positive and false negative results   can occur. The test should be used at regular intervals, and positive results   should be confirmed before definitive therapy.   This liquid based specimen is processed using the  or  Thin PrepPAP   System. This specimen has been analyzed by the ThinPrep Imaging System   (Instructure), an automated imaging and review system which assists   the laboratory in evaluating cells on ThinPrep PAP tests. Following automated   imaging, selected fields from every slide are reviewed by a cytotechnologist   and/or pathologist.    Resulting Agency [facility]              Specimen Collected: 01/30/20 14:05 CST Last Resulted: 02/23/20 09:48 CST                     Component Ref Range & Units 3 yr ago   HPV other High Risk  types, PCR Negative Negative    Comment: Other HPV genotypes include:   31,33,35,39,45,51,52,56,58,59,66 and 68.    HPV High Risk type 16, PCR Negative Negative    HPV High Risk type 18, PCR Negative Negative    Resulting Agency  [facility]              Specimen Collected: 01/30/20 14:06 CST Last Resulted: 02/06/20 13:48 CST             Details    Reading Physician Reading Date Result Priority   Brandin Mcgregor MD  [phone] 6/17/2022 Routine     Physician Responsible for MQSA Outcome Reason    Brandin Mcgregor MD Signed      Narrative & Impression  Result:  Mammo Digital Screening Bilat w/ Aakash     History:  Patient is 52 y.o. and is seen for a screening mammogram.        Films Compared:  Compared to: 04/23/2021 Mammo Digital Screening Bilat w/ Aakash (No Change)      Findings:   This procedure was performed using tomosynthesis.   Computer-aided detection was utilized in the interpretation of this examination.     The breasts have scattered areas of fibroglandular density. There is no evidence of suspicious masses, microcalcifications or architectural distortion.     Impression:   No mammographic evidence of malignancy.     BI-RADS Category 1: Negative     Recommendation:  Routine screening mammogram in 1 year is recommended.     Your estimated lifetime risk of breast cancer (to age 85) based on Tyrer-Cuzick risk assessment model is 11.28 %.  According to the American Cancer Society, patients with a lifetime breast cancer risk of 20% or higher might benefit from supplemental screening tests. ??                     Exam Ended: 06/16/22 07:56 CDT Last Resulted: 06/17/22 09:38 CDT             ASSESSMENT/PLAN:    1. Chronic bilateral low back pain without sciatica  Overview:  - s/p fall  - no signs of neurological claudication   -recommend x-ray imaging  -recommend physical therapy    Orders:  -     X-Ray Lumbar Spine Ap Lateral w/Flex Ext; Future; Expected date: 01/10/2024  -     Ambulatory referral/consult to  Physical/Occupational Therapy; Future; Expected date: 01/17/2024  -     tiZANidine (ZANAFLEX) 4 MG tablet; Take 1 tablet (4 mg total) by mouth nightly as needed (back pain).  Dispense: 30 tablet; Refill: 0    2. Upper back pain  Overview:  - s/p fall  - no signs of neurological claudication   -recommend x-ray imaging  -recommend physical therapy  - okay for tizanidine 4 mg at bedtime as needed    Orders:  -     Cancel: X-Ray Thoracic Spine 4 or more views; Future; Expected date: 01/10/2024  -     Ambulatory referral/consult to Physical/Occupational Therapy; Future; Expected date: 01/17/2024  -     tiZANidine (ZANAFLEX) 4 MG tablet; Take 1 tablet (4 mg total) by mouth nightly as needed (back pain).  Dispense: 30 tablet; Refill: 0  -     X-Ray Thoracic Spine AP Lateral; Future; Expected date: 01/10/2024    3. Bilateral carpal tunnel syndrome  Overview:  - discussed that her symptoms seem consistent with carpal tunnel syndrome  -recommend start with bilateral carpal tunnel wrist splints.  I recommend wearing at night.  She can also try risk compression while she was working during the day.    - if no improvement of continued worsening recommend evaluation by hand surgery    Orders:  -     Ambulatory referral/consult to Physical/Occupational Therapy; Future; Expected date: 01/17/2024    4. Essential hypertension  Overview:  - well controlled  - continue current management plan   - patient encouraged to notify me with any changes    Orders:  -     Cologuard Screening (Multitarget Stool DNA); Future; Expected date: 01/10/2024  -     amLODIPine (NORVASC) 5 MG tablet; Take 1 tablet (5 mg total) by mouth once daily.  Dispense: 90 tablet; Refill: 3  -     spironolactone (ALDACTONE) 50 MG tablet; Take 1 tablet (50 mg total) by mouth once daily.  Dispense: 90 tablet; Refill: 3    5. Hirsutism  Overview:  - currently on spironolactone    Orders:  -     spironolactone (ALDACTONE) 50 MG tablet; Take 1 tablet (50 mg total) by mouth  once daily.  Dispense: 90 tablet; Refill: 3    6. Recurrent major depressive disorder, in full remission  Overview:  - well controlled  - continue current management plan   - patient encouraged to notify me with any changes    Orders:  -     sertraline (ZOLOFT) 100 MG tablet; Take 1 tablet (100 mg total) by mouth once daily.  Dispense: 90 tablet; Refill: 3    7. Generalized anxiety disorder  Overview:  - well controlled  - continue current management plan   - patient encouraged to notify me with any changes    Orders:  -     sertraline (ZOLOFT) 100 MG tablet; Take 1 tablet (100 mg total) by mouth once daily.  Dispense: 90 tablet; Refill: 3    8. Prediabetes  Overview:  Lab Results   Component Value Date    HGBA1C 5.9 (H) 01/08/2024     - discussed recommendation for diet and cardiovascular exercise  - counseling on lifestyle modifications for risk factor reduction  - counseling on management options      9. Class 3 severe obesity due to excess calories with serious comorbidity and body mass index (BMI) of 45.0 to 49.9 in adult  Overview:  - discussed recommendation for diet and cardiovascular exercise  - counseling on lifestyle modifications for risk factor reduction  - counseling on management options  - opts for medication management and recommend start Tirzepatide 2.5 mg weekly and uptitrate as tolerated  - Criss Link denies history of pancreatitis or heavy alcohol use and denies family and person history of thyroid medullary cancer and MENs  - counseling regarding new medication including expected results, potential side effects, and appropriate use.  - questions elicited and answered  - encouraged to patient to notify me of any questions or concerns  - discussed cost being a limiting issue for weight loss medication and directed to  website  - recommend add psyllium husk  - recommend diversify diet and goal of 3-4 colors of the rainbow in every meal and avoid UPF      Orders:  -     tirzepatide,  weight loss, (ZEPBOUND) 2.5 mg/0.5 mL PnIj; Inject 2.5 mg into the skin every 7 days.  Dispense: 4 Pen; Refill: 0    10. Screening for HIV (human immunodeficiency virus)  -     HIV 1/2 Ag/Ab (4th Gen); Future; Expected date: 01/10/2024    11. Screen for STD (sexually transmitted disease)  -     C. trachomatis/N. gonorrhoeae by AMP DNA [facility]; Urine; Future; Expected date: 01/10/2024  -     RPR; Future; Expected date: 01/10/2024    12. Encounter for screening mammogram for breast cancer  -     Mammo Digital Screening Bilat w/ Aakash; Future; Expected date: 01/10/2024    13. Screen for colon cancer          ORDERS:   Orders Placed This Encounter    Cologuard Screening (Multitarget Stool DNA)    C. trachomatis/N. gonorrhoeae by AMP DNA [facility]; Urine    Mammo Digital Screening Bilat w/ Aakash    X-Ray Lumbar Spine Ap Lateral w/Flex Ext    X-Ray Thoracic Spine AP Lateral    HIV 1/2 Ag/Ab (4th Gen)    RPR    Ambulatory referral/consult to Physical/Occupational Therapy    Ambulatory referral/consult to Physical/Occupational Therapy    tirzepatide, weight loss, (ZEPBOUND) 2.5 mg/0.5 mL PnIj    tiZANidine (ZANAFLEX) 4 MG tablet    amLODIPine (NORVASC) 5 MG tablet    spironolactone (ALDACTONE) 50 MG tablet    sertraline (ZOLOFT) 100 MG tablet       Vaccines recommended:  Flu, COVID-19 and shingles    Follow-up in 1 month or sooner if any concerns.      This note is dictated using the M*Modal Fluency Direct word recognition program. There are word recognition mistakes that are occasionally missed on review.    Dr. Leanna Galeano D.O.   Family Medicine

## 2024-01-10 ENCOUNTER — OFFICE VISIT (OUTPATIENT)
Dept: PRIMARY CARE CLINIC | Facility: CLINIC | Age: 54
End: 2024-01-10
Attending: FAMILY MEDICINE
Payer: COMMERCIAL

## 2024-01-10 ENCOUNTER — APPOINTMENT (OUTPATIENT)
Dept: RADIOLOGY | Facility: OTHER | Age: 54
End: 2024-01-10
Attending: FAMILY MEDICINE
Payer: COMMERCIAL

## 2024-01-10 VITALS
BODY MASS INDEX: 49.8 KG/M2 | HEART RATE: 79 BPM | OXYGEN SATURATION: 97 % | SYSTOLIC BLOOD PRESSURE: 132 MMHG | DIASTOLIC BLOOD PRESSURE: 76 MMHG | WEIGHT: 293 LBS

## 2024-01-10 DIAGNOSIS — G56.03 BILATERAL CARPAL TUNNEL SYNDROME: ICD-10-CM

## 2024-01-10 DIAGNOSIS — M54.50 CHRONIC BILATERAL LOW BACK PAIN WITHOUT SCIATICA: Primary | ICD-10-CM

## 2024-01-10 DIAGNOSIS — Z11.4 SCREENING FOR HIV (HUMAN IMMUNODEFICIENCY VIRUS): ICD-10-CM

## 2024-01-10 DIAGNOSIS — I10 ESSENTIAL HYPERTENSION: ICD-10-CM

## 2024-01-10 DIAGNOSIS — F33.42 RECURRENT MAJOR DEPRESSIVE DISORDER, IN FULL REMISSION: ICD-10-CM

## 2024-01-10 DIAGNOSIS — M54.50 ACUTE BILATERAL LOW BACK PAIN WITHOUT SCIATICA: ICD-10-CM

## 2024-01-10 DIAGNOSIS — F41.1 GENERALIZED ANXIETY DISORDER: ICD-10-CM

## 2024-01-10 DIAGNOSIS — M54.9 UPPER BACK PAIN: ICD-10-CM

## 2024-01-10 DIAGNOSIS — Z12.31 ENCOUNTER FOR SCREENING MAMMOGRAM FOR BREAST CANCER: ICD-10-CM

## 2024-01-10 DIAGNOSIS — L68.0 HIRSUTISM: ICD-10-CM

## 2024-01-10 DIAGNOSIS — Z11.3 SCREEN FOR STD (SEXUALLY TRANSMITTED DISEASE): ICD-10-CM

## 2024-01-10 DIAGNOSIS — Z12.11 SCREEN FOR COLON CANCER: ICD-10-CM

## 2024-01-10 DIAGNOSIS — G89.29 CHRONIC BILATERAL LOW BACK PAIN WITHOUT SCIATICA: Primary | ICD-10-CM

## 2024-01-10 DIAGNOSIS — E66.01 CLASS 3 SEVERE OBESITY DUE TO EXCESS CALORIES WITH SERIOUS COMORBIDITY AND BODY MASS INDEX (BMI) OF 45.0 TO 49.9 IN ADULT: ICD-10-CM

## 2024-01-10 DIAGNOSIS — R73.03 PREDIABETES: ICD-10-CM

## 2024-01-10 PROBLEM — D64.9 NORMOCYTIC ANEMIA: Status: RESOLVED | Noted: 2020-09-16 | Resolved: 2024-01-10

## 2024-01-10 PROCEDURE — 3075F SYST BP GE 130 - 139MM HG: CPT | Mod: CPTII,S$GLB,, | Performed by: FAMILY MEDICINE

## 2024-01-10 PROCEDURE — 99215 OFFICE O/P EST HI 40 MIN: CPT | Mod: S$GLB,,, | Performed by: FAMILY MEDICINE

## 2024-01-10 PROCEDURE — 72110 X-RAY EXAM L-2 SPINE 4/>VWS: CPT | Mod: TC,PN

## 2024-01-10 PROCEDURE — 3078F DIAST BP <80 MM HG: CPT | Mod: CPTII,S$GLB,, | Performed by: FAMILY MEDICINE

## 2024-01-10 PROCEDURE — 3008F BODY MASS INDEX DOCD: CPT | Mod: CPTII,S$GLB,, | Performed by: FAMILY MEDICINE

## 2024-01-10 PROCEDURE — 3044F HG A1C LEVEL LT 7.0%: CPT | Mod: CPTII,S$GLB,, | Performed by: FAMILY MEDICINE

## 2024-01-10 PROCEDURE — 72110 X-RAY EXAM L-2 SPINE 4/>VWS: CPT | Mod: 26,,, | Performed by: RADIOLOGY

## 2024-01-10 PROCEDURE — 99999 PR PBB SHADOW E&M-EST. PATIENT-LVL V: CPT | Mod: PBBFAC,,, | Performed by: FAMILY MEDICINE

## 2024-01-10 PROCEDURE — 1159F MED LIST DOCD IN RCRD: CPT | Mod: CPTII,S$GLB,, | Performed by: FAMILY MEDICINE

## 2024-01-10 RX ORDER — AMLODIPINE BESYLATE 5 MG/1
5 TABLET ORAL DAILY
Qty: 90 TABLET | Refills: 3 | Status: SHIPPED | OUTPATIENT
Start: 2024-01-10 | End: 2025-01-09

## 2024-01-10 RX ORDER — SERTRALINE HYDROCHLORIDE 100 MG/1
100 TABLET, FILM COATED ORAL DAILY
Qty: 90 TABLET | Refills: 3 | Status: SHIPPED | OUTPATIENT
Start: 2024-01-10 | End: 2025-01-09

## 2024-01-10 RX ORDER — TIRZEPATIDE 2.5 MG/.5ML
2.5 INJECTION, SOLUTION SUBCUTANEOUS
Qty: 4 PEN | Refills: 0 | Status: SHIPPED | OUTPATIENT
Start: 2024-01-10

## 2024-01-10 RX ORDER — SPIRONOLACTONE 50 MG/1
50 TABLET, FILM COATED ORAL DAILY
Qty: 90 TABLET | Refills: 3 | Status: SHIPPED | OUTPATIENT
Start: 2024-01-10 | End: 2025-01-09

## 2024-01-10 RX ORDER — TIZANIDINE 4 MG/1
4 TABLET ORAL NIGHTLY PRN
Qty: 30 TABLET | Refills: 0 | Status: SHIPPED | OUTPATIENT
Start: 2024-01-10 | End: 2024-02-02

## 2024-01-11 ENCOUNTER — PATIENT MESSAGE (OUTPATIENT)
Dept: PRIMARY CARE CLINIC | Facility: CLINIC | Age: 54
End: 2024-01-11
Payer: COMMERCIAL

## 2024-01-24 ENCOUNTER — HOSPITAL ENCOUNTER (OUTPATIENT)
Dept: RADIOLOGY | Facility: OTHER | Age: 54
Discharge: HOME | End: 2024-01-24
Attending: FAMILY MEDICINE
Payer: COMMERCIAL

## 2024-01-24 DIAGNOSIS — Z12.31 ENCOUNTER FOR SCREENING MAMMOGRAM FOR BREAST CANCER: ICD-10-CM

## 2024-01-24 PROCEDURE — 77067 SCR MAMMO BI INCL CAD: CPT | Mod: 26,,, | Performed by: RADIOLOGY

## 2024-01-24 PROCEDURE — 77067 SCR MAMMO BI INCL CAD: CPT | Mod: TC

## 2024-01-24 PROCEDURE — 77063 BREAST TOMOSYNTHESIS BI: CPT | Mod: 26,,, | Performed by: RADIOLOGY

## 2024-01-26 ENCOUNTER — PATIENT MESSAGE (OUTPATIENT)
Dept: PRIMARY CARE CLINIC | Facility: CLINIC | Age: 54
End: 2024-01-26
Payer: COMMERCIAL

## 2024-01-30 ENCOUNTER — PATIENT MESSAGE (OUTPATIENT)
Dept: PRIMARY CARE CLINIC | Facility: CLINIC | Age: 54
End: 2024-01-30
Payer: COMMERCIAL

## 2024-01-30 DIAGNOSIS — Z11.3 SCREEN FOR STD (SEXUALLY TRANSMITTED DISEASE): Primary | ICD-10-CM

## 2024-02-02 DIAGNOSIS — G89.29 CHRONIC BILATERAL LOW BACK PAIN WITHOUT SCIATICA: ICD-10-CM

## 2024-02-02 DIAGNOSIS — M54.50 CHRONIC BILATERAL LOW BACK PAIN WITHOUT SCIATICA: ICD-10-CM

## 2024-02-02 DIAGNOSIS — M54.9 UPPER BACK PAIN: ICD-10-CM

## 2024-02-02 RX ORDER — TIZANIDINE 4 MG/1
4 TABLET ORAL NIGHTLY PRN
Qty: 90 TABLET | Refills: 1 | Status: SHIPPED | OUTPATIENT
Start: 2024-02-02

## 2024-02-02 NOTE — TELEPHONE ENCOUNTER
No care due was identified.  [facility] Embedded Care Due Messages. Reference number: [id number].   2/02/2024 11:30:51 AM CST

## 2024-02-02 NOTE — TELEPHONE ENCOUNTER
Refill Encounter    PCP Visits: Recent Visits  Date Type Provider Dept   01/10/24 Office Visit Leanna Galeano, DO [facility]   03/30/23 Office Visit Leanna Galeano, DO [facility]   02/27/23 Office Visit Leanna Galeano, DO [facility]   Showing recent visits within past 360 days and meeting all other requirements  Future Appointments  No visits were found meeting these conditions.  Showing future appointments within next 720 days and meeting all other requirements     Last 3 Blood Pressure:   BP Readings from Last 3 Encounters:   01/10/24 132/76   10/30/23 (!) 140/80   10/16/23 136/78     Preferred Pharmacy:   [facility]/pharmacy #[id number] - [place] - [address] [place] [address]  Phone: [phone] Fax: [phone]    [facility]/pharmacy #[id number] - [place] - [address] [facility] [address] [facility]  [address] [facility] [address]  Phone: [phone] Fax: [phone]    Requested RX:  Requested Prescriptions     Pending Prescriptions Disp Refills    tiZANidine (ZANAFLEX) 4 MG tablet [Pharmacy Med Name: TIZANIDINE HCL 4 MG TABLET] 90 tablet 1     Sig: TAKE 1 TABLET (4 MG TOTAL) BY MOUTH NIGHTLY AS NEEDED (BACK PAIN).      RX Route: Normal

## 2024-02-05 ENCOUNTER — PATIENT MESSAGE (OUTPATIENT)
Dept: REHABILITATION | Facility: OTHER | Age: 54
End: 2024-02-05
Payer: COMMERCIAL

## 2024-02-09 ENCOUNTER — PATIENT MESSAGE (OUTPATIENT)
Dept: PRIMARY CARE CLINIC | Facility: CLINIC | Age: 54
End: 2024-02-09
Payer: COMMERCIAL

## 2024-02-12 ENCOUNTER — CLINICAL SUPPORT (OUTPATIENT)
Dept: REHABILITATION | Facility: OTHER | Age: 54
End: 2024-02-12
Attending: FAMILY MEDICINE
Payer: COMMERCIAL

## 2024-02-12 DIAGNOSIS — G89.29 CHRONIC BILATERAL LOW BACK PAIN WITHOUT SCIATICA: ICD-10-CM

## 2024-02-12 DIAGNOSIS — M62.81 MUSCLE WEAKNESS OF LOWER EXTREMITY: Primary | ICD-10-CM

## 2024-02-12 DIAGNOSIS — M54.9 UPPER BACK PAIN: ICD-10-CM

## 2024-02-12 DIAGNOSIS — M54.50 CHRONIC BILATERAL LOW BACK PAIN WITHOUT SCIATICA: ICD-10-CM

## 2024-02-12 PROCEDURE — 97161 PT EVAL LOW COMPLEX 20 MIN: CPT | Mod: PN

## 2024-02-12 PROCEDURE — 97110 THERAPEUTIC EXERCISES: CPT | Mod: PN

## 2024-02-12 NOTE — PATIENT INSTRUCTIONS
"PIRIFORMIS STRETCH - MODIFIED 2          While lying on your back, hold your knee with one hand and your ankle with the other. Pull your leg up and over towards the opposite shoulder as shown.     Hold for 30 seconds. Perform 3 repetitions .    Copyright © 2024 [employer]    SINGLE KNEE TO CHEST STRETCH - SKTC          While lying on your back, use your hands and gently draw up a knee towards your chest.     Keep your other knee straight and lying on the ground.    Hold for 30 seconds. Perform 3 repetitions .    Copyright © 2024 [employer]    BRIDGE - BRIDGING        While lying on your back with knees bent, tighten your lower abdominal muscles, squeeze your buttocks and then raise your buttocks off the floor/bed as creating a "Bridge" with your body. Hold and then lower yourself and repeat.    Hold for 3 seconds. Perform 2 sets of 10 repetitions .    Copyright © 2024 [employer]    "

## 2024-02-12 NOTE — PLAN OF CARE
[facility]  Physical Therapy Initial Evaluation    Name: Criss Link  Clinic Number: [id number]    Therapy Diagnosis:   Encounter Diagnoses   Name Primary?    Chronic bilateral low back pain without sciatica     Upper back pain     Muscle weakness of lower extremity Yes     Physician: Leanna Galeano DO    Physician Orders: PT Eval and Treat   Medical Diagnosis:   M54.50 (ICD-10-CM) - Acute bilateral low back pain without sciatica   M54.9 (ICD-10-CM) - Upper back pain   Evaluation Date: 2/12/2024  Authorization Period Expiration: 1/9/2025  Plan of Care Certification Period: 4/8/2024  Visit # / Visits authorized: 1/ 1    Time In: 1215  Time Out: 1255  Total Billable Time separate from evaluation: 12 minutes    Precautions: Standard      Subjective   Date of onset: November  History of current condition - Criss reports: increased low back pain after falling out of the tub in November.       Past Medical History:   Diagnosis Date    Allergies     Depression     Hypertension     Idiopathic polypoidal choroidal vasculopathy     Prediabetes      Criss Link  has a past surgical history that includes Tubal ligation (12/30/2004).    Criss has a current medication list which includes the following prescription(s): amlodipine, azelastine, dorzolamide, ferrous gluconate, meloxicam, montelukast, sertraline, spironolactone, zepbound, and tizanidine.    Review of patient's allergies indicates:  No Known Allergies     Falls: November 2023, slipped and fell getting out of the tub. Also fel today at work. Fell out of her chair onto her right side.     Imaging:  X-ray  1/10/2024  FINDINGS:  Thoracolumbar alignment is maintained.  There is no evidence of an acute fracture.  No instability.  Degenerative changes are present throughout the spine.  Findings are worse at L5-S1 where there is for intervertebral disc height loss with facet arthropathy.      Prior Therapy: yes, for left knee pain  Social  "History: lives with their family. Hs stairs at her home.  Occupation:  on the [employer]  Prior Level of Function: Independent with ambulation and activities of daily living   Current Level of Function: Independent with ambulation, a for dressing, pain with house hold chores    Pain:  Current 5/10, worst 9/10, best 0/10   Location: bilateral back   Description: Aching, Throbbing, and tense  Aggravating Factors: standing, cleaning her house, negotiating stairs, and walking  Easing Factors: rest and muscle relaxant    Radicular symptoms: none  Bowel and Bladder incontinence: none    Sleep is not disturbed.     Patients goals: "et to the point that I can walk farther than I can before the fall."    Objective       Postural examination in standing:  - increased lumbar lordosis  - forward head  - forward shoulders    Postural examination in sitting:   - decreased lumbar lordosis  - forward head  - forward shoulders  - elevated left shoulder      Functional assessment:   - walking: Independent   - sit to stand: Independent   - sit to supine:  Independent       - supine to sit: Independent   - supine to prone: Independent     Lumbar active range of motion in standing is:  Flexion 70 degrees    Extension 30 degrees    Left side bending 20 degrees    Right side bending 25 degrees    Left rotation Decreased 50 %   Right rotation Decreased 50 %     Lumbar Special tests    Straight Leg Raise negative   Cross Straight Leg Raise  negative   RON negative    Prone Instability Test negative       MMT R L   Hip flexion 5/5 5/5   Hip abduction 3+/5 3+/5   Hip extension 3+/5 3+/5   Hip external rotation  5/5 4/5   Hip internal rotation  3+/5 5/5   Knee extension 5/5 5/5   Knee flexion 5/5 5/5   Ankle dorsiflexion 5/5 5/5   Ankle plantar flexion 5/5 5/5   Ankle inversion 5/5 5/5   Ankle eversion 5/5 5/5     Flexibility testing:  - hamstrings:            bilateral: within normal limits   - gastrocnemius:      " "bilateral: within normal limits       - piriformis:             bilateral: tight, decreased 50%            - quadriceps:         bilateral: tight,  decreased 50%            - hip flexors:          bilateral: tight, decreased 25%  - hip adductors:    bilateral: within normal limits   - iliotibial band :    bilateral: tight, decreased 50%    Palpation: tenderness in bilateral PSIS     Sensation:  - Light Touch: intact  - Saddle: intact    Reflexes:   - Knee Jerk: 2+     Intake Outcome Measure for FOTO Lumbar Spine Survey    Therapist reviewed FOTO scores for Criss Link on 2/12/2024.   FOTO report - see Media section or FOTO account episode details.    Intake Score: 49%         TREATMENT   Treatment Time In: 12:38  Treatment Time Out: 1250  Total Treatment time separate from Evaluation time: 12 minutes    Criss received therapeutic exercises to develop strength, endurance, ROM, posture, and core stabilization for 12 minutes including:  [x] Piriformis stretch 3 x 30"  [x] SKTC 3 x 30"  [x] Bridging x 1 repetitions       Home Exercises Provided and Patient Education Provided     Education provided:   - Discussed the role of the PTA on the Rehab Team. Discussed patient will be seen by a physical therapist minimally every 6th visit or every 30 days prior to being seen by PTA. Also discussed the use of the [facility] for communication.    Piriformis stretch  SKTC  bridging    Written Home Exercises Provided: yes.  Exercises were reviewed and Criss was able to demonstrate them prior to the end of the session.  Criss demonstrated good  understanding of the education provided.     See Electronic Medical Record under Patient Instructions for exercises provided 2/12/2024.    Assessment   Criss is a 53 y.o. female referred to outpatient Physical Therapy with a medical diagnosis of M54.50 (ICD-10-CM) - Acute bilateral low back pain without sciatica, M54.9 (ICD-10-CM) - Upper back pain . Pt presents with postural " imbalance, decreased bilateral lower extremities strength and flexibility, pain with activities of daily living, weak core, and decreased low back strength contributing to low back pain. Patient has hx of upper back pain, but low back pain is more problematic. Patient works as a clr and at a desk. Will incorporate postural reeducation exercises in work positions.      Patient prognosis is Good.   Patient will benefit from skilled outpatient Physical Therapy to address the deficits stated above and in the chart below, provide pt/family education, and to maximize pt's level of independence.     Plan of care discussed with patient: Yes  Patient's spiritual, cultural and educational needs considered and patient is agreeable to the plan of care and goals as stated below:     Anticipated Barriers for therapy: work schedule    Medical Necessity is demonstrated by the following:      Medical Necessity is demonstrated by the following  History  Co-morbidities and personal factors that may impact the plan of care [] LOW: no personal factors / co-morbidities  [] MODERATE: 1-2 personal factors / co-morbidities  [x] HIGH: 3+ personal factors / co-morbidities    Moderate / High Support Documentation:   Co-morbidities affecting plan of care: anxiety, depression, high BMI, HTN and anemia, sleep apnea     Personal Factors:   no deficits     Examination  Body Structures and Functions, activity limitations and participation restrictions that may impact the plan of care [] LOW: addressing 1-2 elements  [x] MODERATE: 3+ elements  [] HIGH: 4+ elements (please support below)    Moderate / High Support Documentation: decreased strength and flexibility, pain with activities of daily living, limited walking and standing distances.     Clinical Presentation [x] LOW: stable  [] MODERATE: Evolving  [] HIGH: Unstable     Decision Making/ Complexity Score: low         Goals:  Short Term Goals: 4 weeks   Independent with home exercise program  .  Report decreased lumbar pain < or =  6/10 with activities of daily living such as standing, cleaning her house, negotiating stairs, and walking  Increased manual muscle test for bilateral lower extremities by 1/2 muscle grade to promote proper pelvic stability to decrease lumbar pain < or =  6/10 with activities of daily living such as standing, cleaning her house, negotiating stairs, and walking       Long Term Goals: 8 weeks   Report decreased lumbar pain< or =  4/10 with activities of daily living such as standing, cleaning her house, negotiating stairs, and walking.   Increased manual muscle test for bilateral lower extremities by 1 muscle grade to promote proper pelvic stability to decrease lumbar pain< or =  4/10 with activities of daily living such as standing, cleaning her house, negotiating stairs, and walking.    Increased flexibility in bilateral piriformis, bilateral hip flexors, bilateral iliotibial bands, and bilateral quadriceps to promote proper pelvic stability to decrease lumbar pain< or =  4/10 with activities of daily living such as standing, cleaning her house, negotiating stairs, and walking.       Plan   Certification Period/Plan of care expiration: 2/12/2024 to 4/8/2024.    Outpatient Physical Therapy 2 times weekly for 8 weeks to include the following interventions: Manual Therapy, Moist Heat/ Ice, Neuromuscular Re-ed, Patient Education, Therapeutic Activities, and Therapeutic Exercise.     Raciel Dumont, PT

## 2024-02-16 ENCOUNTER — LAB VISIT (OUTPATIENT)
Dept: LAB | Facility: OTHER | Age: 54
End: 2024-02-16
Attending: FAMILY MEDICINE
Payer: COMMERCIAL

## 2024-02-16 DIAGNOSIS — Z11.3 SCREEN FOR STD (SEXUALLY TRANSMITTED DISEASE): ICD-10-CM

## 2024-02-16 LAB
C TRACH DNA SPEC QL NAA+PROBE: NOT DETECTED
N GONORRHOEA DNA SPEC QL NAA+PROBE: NOT DETECTED

## 2024-02-16 PROCEDURE — 87491 CHLMYD TRACH DNA AMP PROBE: CPT | Performed by: FAMILY MEDICINE

## 2024-02-19 NOTE — PROGRESS NOTES
[facility]   Physical Therapy Treatment Note      Name: Criss Link  Clinic Number: [id number]    Therapy Diagnosis:   Encounter Diagnosis   Name Primary?    Chronic bilateral low back pain without sciatica Yes     Physician: Leanna Galeano DO    Visit Date: 2/20/2024    Physician Orders: PT Eval and Treat   Medical Diagnosis:   M54.50 (ICD-10-CM) - Acute bilateral low back pain without sciatica   M54.9 (ICD-10-CM) - Upper back pain   Evaluation Date: 2/12/2024  Authorization Period Expiration: 1/9/2025  Plan of Care Certification Period: 4/8/2024  Visit # / Visits authorized: 2/21      Time In: 1200   Time Out: 1250   Total Billable Time separate from evaluation: 50 minutes     Precautions: Standard    PTA Visit #: 1/5       Subjective     Patient reports: today is a good day and her back does not really hurt. Has not been compliant with HEP. Got a standing desk at work, and uses it first thing in the morning for 15 minutes today. States she has gained weight since she got her new job where she sits all day.     She was not compliant with home exercise program.  Response to previous treatment: good, has   Functional change: been trying to do more by moving around and do some stretching, got up to 15 minutes with standing desk     Pain: 4/10  Location: B low back     Objective      Objective Measures updated at progress report unless specified.     Treatment     Criss received the treatments listed below:      therapeutic exercises to develop strength, endurance, ROM, flexibility, posture, and core stabilization for 00 minutes including:       manual therapy techniques: Joint mobilizations, Myofacial release, Soft tissue Mobilization, and Friction Massage were applied to the:  for 00 minutes, including:      neuromuscular re-education activities to improve: Balance, Coordination, Kinesthetic, Sense, Proprioception, and Posture for 25 minutes. The following activities were included:  [x]   "TrA contraction 2 x 10, hold 5"   [x] TrA contraction + BKFO x 10   [x]  PPT + BKFO x 10   [x] supine hip abduction vs pilates ring 3 x 10   [x] seated hip IR vs YTB (yoga block between knees) 2 x 10     therapeutic activities to improve functional performance for 25  minutes, including:  [x] Reviewed and educated pt on HEP   [x] Piriformis stretch 3 x 30" B   [x] SKTC 3 x 30" B (with towel)   [x] PPT x 10, hold 5"   [x] Bridging + PPT x 2 x 10   [x] standing hip abduction/extension    [x] Educated on TrA contraction for postural stability and to use functionally and habitually while at work while sitting and standing       Patient Education and Home Exercises       Education provided:   - TrA anatomy and physiology, ergonomics at work     Written Home Exercises Provided: Patient instructed to cont prior HEP. Exercises were reviewed and Criss was able to demonstrate them prior to the end of the session.  Criss demonstrated good  understanding of the education provided. See Electronic Medical Record under Patient Instructions for exercises provided during therapy sessions    Assessment     Criss had good tolerance to initial treatment today with no adverse effects. Had good motor control of TrA with juddering during BKFO; therefore switched to PPT for increased core recruitment to complete second set. Required moderate, intermittent verbal cues for breathing coordination with exercise. Also required heavy verbal and tactile cues to increase glut med recruitment and prevent substitutions during standing hip abd/ext. Continue to monitor and progress pt as tolerated. Consider printing work ergonomics page for pt nvChristine Kahn Is progressing well towards her goals.   Patient prognosis is Good.     Patient will continue to benefit from skilled outpatient physical therapy to address the deficits listed in the problem list box on initial evaluation, provide pt/family education and to maximize pt's level of independence " in the home and community environment.     Patient's spiritual, cultural and educational needs considered and pt agreeable to plan of care and goals.     Anticipated barriers to physical therapy: work schedule     Goals: updated 02/20/2024  Short Term Goals: 4 weeks   Independent with home exercise program . (Progressing, not met)  Report decreased lumbar pain < or =  6/10 with activities of daily living such as standing, cleaning her house, negotiating stairs, and walking(Progressing, not met)  Increased manual muscle test for bilateral lower extremities by 1/2 muscle grade to promote proper pelvic stability to decrease lumbar pain < or =  6/10 with activities of daily living such as standing, cleaning her house, negotiating stairs, and walking (Progressing, not met)        Long Term Goals: 8 weeks   Report decreased lumbar pain< or =  4/10 with activities of daily living such as standing, cleaning her house, negotiating stairs, and walking. (Progressing, not met)  Increased manual muscle test for bilateral lower extremities by 1 muscle grade to promote proper pelvic stability to decrease lumbar pain< or =  4/10 with activities of daily living such as standing, cleaning her house, negotiating stairs, and walking.  (Progressing, not met)  Increased flexibility in bilateral piriformis, bilateral hip flexors, bilateral iliotibial bands, and bilateral quadriceps to promote proper pelvic stability to decrease lumbar pain< or =  4/10 with activities of daily living such as standing, cleaning her house, negotiating stairs, and walking. (Progressing, not met)       Plan   Certification Period/Plan of care expiration: 2/12/2024 to 4/8/2024.     Continue pt's current POC to reduce pain and improve ROM, strength, and functional performance of core and B LE to improve participation with ADLs.     Outpatient Physical Therapy 2 times weekly for 8 weeks to include the following interventions: Manual Therapy, Moist Heat/ Ice,  Neuromuscular Re-ed, Patient Education, Therapeutic Activities, and Therapeutic Exercise.     Quynh Ferreira, PTA

## 2024-02-20 ENCOUNTER — CLINICAL SUPPORT (OUTPATIENT)
Dept: REHABILITATION | Facility: OTHER | Age: 54
End: 2024-02-20
Payer: COMMERCIAL

## 2024-02-20 ENCOUNTER — DOCUMENTATION ONLY (OUTPATIENT)
Dept: REHABILITATION | Facility: OTHER | Age: 54
End: 2024-02-20

## 2024-02-20 DIAGNOSIS — G89.29 CHRONIC BILATERAL LOW BACK PAIN WITHOUT SCIATICA: Primary | ICD-10-CM

## 2024-02-20 DIAGNOSIS — M54.50 CHRONIC BILATERAL LOW BACK PAIN WITHOUT SCIATICA: Primary | ICD-10-CM

## 2024-02-20 PROCEDURE — 97530 THERAPEUTIC ACTIVITIES: CPT | Mod: PN,CQ

## 2024-02-20 PROCEDURE — 97112 NEUROMUSCULAR REEDUCATION: CPT | Mod: PN,CQ

## 2024-02-20 NOTE — PROGRESS NOTES
Physical Therapist and Physical Therapist Assistant met face to face to discuss patient's treatment plan and progress towards established goals. Pt will be seen by a physical therapist minimally every 6th visit or every 30 days.    Quynh Ferreira PTA  02/20/2024     Meeting as noted above.     Raciel Dumont, PT, CWS, Cert DN

## 2024-02-26 ENCOUNTER — DOCUMENTATION ONLY (OUTPATIENT)
Dept: REHABILITATION | Facility: OTHER | Age: 54
End: 2024-02-26
Payer: COMMERCIAL

## 2024-02-29 NOTE — PROGRESS NOTES
[facility]   Physical Therapy Treatment Note      Name: Criss Link  Clinic Number: [id number]    Therapy Diagnosis:   Encounter Diagnoses   Name Primary?    Chronic bilateral low back pain without sciatica Yes    Upper back pain     Muscle weakness of lower extremity        Physician: Leanna Galeano DO    Visit Date: 3/1/2024    Physician Orders: PT Eval and Treat   Medical Diagnosis:   M54.50 (ICD-10-CM) - Acute bilateral low back pain without sciatica   M54.9 (ICD-10-CM) - Upper back pain   Evaluation Date: 2/12/2024  Authorization Period Expiration: 1/9/2025  Plan of Care Certification Period: 4/8/2024  Visit # / Visits authorized: 2/20 (+ evaluation)   Re-assessment: due 3/12/2024  FOTO: 2/12/2024 (3/5; 1)       Time In: 7:05 am  Time Out: 7:41 am   Total Billable Time separate from evaluation: 35 minutes     Precautions: Standard    PTA Visit #: 0/5       Subjective     Patient reports: she has been feeling pretty good lately.  She went to the chiropractor the other day and that seemed to help.  She denies any pain or discomfort after last session. She would like to leave about 5 minutes early to get to work.     She was not compliant with home exercise program.  Response to previous treatment: good, has   Functional change: able to stand up longer (now up to 30 minutes)    Pain: 2/10  Location: B low back     Objective      Objective Measures updated at progress report unless specified.     Treatment     Criss received the treatments listed below:      therapeutic exercises to develop strength, endurance, ROM, flexibility, posture, and core stabilization for 00 minutes including:       manual therapy techniques: Joint mobilizations, Myofacial release, Soft tissue Mobilization, and Friction Massage were applied to the:  for 00 minutes, including:      neuromuscular re-education activities to improve: Balance, Coordination, Kinesthetic, Sense, Proprioception, and Posture for 20  "minutes. The following activities were included:  [x]  Transverse Abdominus contraction 2 x 10, hold 5"   [x] Transverse Abdominus contraction + Bent knee fall out with red band x 10   [x]  posterior pelvic tilt + bent knee fall out and red band 2x 10   [x] supine hip abduction vs pilates ring 2x 10   [] seated hip internal rotation vs Yellow band (yoga block between knees) 2 x 10     therapeutic activities to improve functional performance for 15 minutes, including:  [] Piriformis stretch 3 x 30" B   [] single knee to chest 3 x 30" bilateral (with towel)   [x] posterior pelvic tilt  2x 10, hold 5"   [x] Bridging + posterior pelvic tilt 2 x 10   [x] standing hip abduction/extension with red band 2x 10 each bilateral        Patient Education and Home Exercises       Education provided:   - exercise form and progressions  - work ergonomic print out  - red band for progression for home exercises    Written Home Exercises Provided: Patient instructed to cont prior HEP. Exercises were reviewed and Criss was able to demonstrate them prior to the end of the session.  Criss demonstrated good  understanding of the education provided. See Electronic Medical Record under Patient Instructions for exercises provided during therapy sessions    Assessment     Patient tolerated progressions with exercises well today including addition of red band for fall outs and standing hip extension/abduction. Continue with core stability and strengthening and consider adding more standing and bending exercises at next visit.     Criss Is progressing well towards her goals.   Patient prognosis is Good.     Patient will continue to benefit from skilled outpatient physical therapy to address the deficits listed in the problem list box on initial evaluation, provide pt/family education and to maximize pt's level of independence in the home and community environment.     Patient's spiritual, cultural and educational needs considered and pt " agreeable to plan of care and goals.     Anticipated barriers to physical therapy: work schedule     Goals:   Short Term Goals: 4 weeks   Independent with home exercise program . (Progressing, not met)  Report decreased lumbar pain < or =  6/10 with activities of daily living such as standing, cleaning her house, negotiating stairs, and walking(Progressing, not met)  Increased manual muscle test for bilateral lower extremities by 1/2 muscle grade to promote proper pelvic stability to decrease lumbar pain < or =  6/10 with activities of daily living such as standing, cleaning her house, negotiating stairs, and walking (Progressing, not met)        Long Term Goals: 8 weeks   Report decreased lumbar pain< or =  4/10 with activities of daily living such as standing, cleaning her house, negotiating stairs, and walking. (Progressing, not met)  Increased manual muscle test for bilateral lower extremities by 1 muscle grade to promote proper pelvic stability to decrease lumbar pain< or =  4/10 with activities of daily living such as standing, cleaning her house, negotiating stairs, and walking.  (Progressing, not met)  Increased flexibility in bilateral piriformis, bilateral hip flexors, bilateral iliotibial bands, and bilateral quadriceps to promote proper pelvic stability to decrease lumbar pain< or =  4/10 with activities of daily living such as standing, cleaning her house, negotiating stairs, and walking. (Progressing, not met)       Plan   Certification Period/Plan of care expiration: 2/12/2024 to 4/8/2024.     Continue pt's current POC to reduce pain and improve ROM, strength, and functional performance of core and B LE to improve participation with ADLs.     Outpatient Physical Therapy 2 times weekly for 8 weeks to include the following interventions: Manual Therapy, Moist Heat/ Ice, Neuromuscular Re-ed, Patient Education, Therapeutic Activities, and Therapeutic Exercise.     Bibi Sheth, PT

## 2024-03-01 ENCOUNTER — CLINICAL SUPPORT (OUTPATIENT)
Dept: REHABILITATION | Facility: OTHER | Age: 54
End: 2024-03-01
Payer: COMMERCIAL

## 2024-03-01 DIAGNOSIS — M54.9 UPPER BACK PAIN: ICD-10-CM

## 2024-03-01 DIAGNOSIS — G89.29 CHRONIC BILATERAL LOW BACK PAIN WITHOUT SCIATICA: Primary | ICD-10-CM

## 2024-03-01 DIAGNOSIS — M54.50 CHRONIC BILATERAL LOW BACK PAIN WITHOUT SCIATICA: Primary | ICD-10-CM

## 2024-03-01 DIAGNOSIS — M62.81 MUSCLE WEAKNESS OF LOWER EXTREMITY: ICD-10-CM

## 2024-03-01 PROCEDURE — 97112 NEUROMUSCULAR REEDUCATION: CPT | Mod: PN

## 2024-03-01 PROCEDURE — 97530 THERAPEUTIC ACTIVITIES: CPT | Mod: PN

## 2024-03-01 NOTE — PATIENT INSTRUCTIONS
Access Code: [email or web address]  URL: [email or web address]  Date: 03/01/2024  Prepared by: Bibi Sheth    Patient Education  - Ergonomics for Back Discomfort  - Introduction to Ergonomics

## 2024-03-06 NOTE — PROGRESS NOTES
"[facility]   Physical Therapy Treatment Note      Name: Criss Link  Clinic Number: [id number]    Therapy Diagnosis:   Encounter Diagnoses   Name Primary?    Muscle weakness of lower extremity Yes    Chronic bilateral low back pain without sciatica        Physician: Leanna Galeano DO    Visit Date: 3/7/2024    Physician Orders: PT Eval and Treat   Medical Diagnosis:   M54.50 (ICD-10-CM) - Acute bilateral low back pain without sciatica   M54.9 (ICD-10-CM) - Upper back pain   Evaluation Date: 2/12/2024  Authorization Period Expiration: 1/9/2025  Plan of Care Certification Period: 4/8/2024  Visit # / Visits authorized: 3/20 (+ evaluation)   Re-assessment: due 3/12/2024  FOTO: 2/12/2024 (4/5; 1)       Time In: 0655  Time Out: 0738  Total Billable Time: 43 minutes     Precautions: Standard    PTA Visit #: 0/5       Subjective     Patient reports: she is not in pain this morning. States she saw the chiropractor. Has not been to the chiropractor. Chiropractor working on her neck and back. Using electrical stimulation, heat, and followed by manipulations. Following up once a week     She was not compliant with home exercise program.  Response to previous treatment: "She challenged me."   Functional change: not in as much pain with every day activities. Takes longer time before the pain comes.     Pain: 0/10  Location: B low back     Objective      Objective Measures updated at progress report unless specified.     Treatment     Criss received the treatments listed below:      therapeutic exercises to develop strength, endurance, ROM, flexibility, posture, and core stabilization for 00 minutes including:       manual therapy techniques: Joint mobilizations, Myofacial release, Soft tissue Mobilization, and Friction Massage were applied to the:  for 00 minutes, including:      neuromuscular re-education activities to improve: Balance, Coordination, Kinesthetic, Sense, Proprioception, and Posture " "for 23 minutes. The following activities were included:  [x] Transverse Abdominus contraction 20 x 5"   [x] posterior pelvic tilt + bent knee fall out and red band 2 x 10   [x] posterior pelvic tilt  2x 10, hold 5"   [x] Bridging + posterior pelvic tilt 2 x 10   [] supine hip abduction vs pilates ring 2x 10   [] seated hip internal rotation vs Yellow band (yoga block between knees) 2 x 10   [x] Piriformis stretch 3 x 30" B   [x] single knee to chest 3 x 30" bilateral (with towel)   [x] standing hip abduction/extension with red band 2x 10 each bilateral      therapeutic activities to improve functional performance for 20 minutes, including:  [x]+sit to stand 2 x 10 repetitions  [x]+TRX suspension strap squats 2 x 10 repetitions   [x]+lateral walks 2 x 30 ft  [x]+hesitation walks 2 x 30 ft      Patient Education and Home Exercises       Education provided:   - exercise form and progressions  - work ergonomic print out  - red band for progression for home exercises    Written Home Exercises Provided: Patient instructed to cont prior HEP. Exercises were reviewed and Criss was able to demonstrate them prior to the end of the session.  Criss demonstrated good  understanding of the education provided. See Electronic Medical Record under Patient Instructions for exercises provided during therapy sessions    Assessment     Continued with core strengthening and postural reeducation today. Added functional movement training Will add carried and kettle bell exercises next visit.    Criss Is progressing well towards her goals.   Patient prognosis is Good.     Patient will continue to benefit from skilled outpatient physical therapy to address the deficits listed in the problem list box on initial evaluation, provide pt/family education and to maximize pt's level of independence in the home and community environment.     Patient's spiritual, cultural and educational needs considered and pt agreeable to plan of care and " goals.     Anticipated barriers to physical therapy: work schedule     Goals:   Short Term Goals: 4 weeks   Independent with home exercise program . (Progressing, not met)  Report decreased lumbar pain < or =  6/10 with activities of daily living such as standing, cleaning her house, negotiating stairs, and walking(Progressing, not met)  Increased manual muscle test for bilateral lower extremities by 1/2 muscle grade to promote proper pelvic stability to decrease lumbar pain < or =  6/10 with activities of daily living such as standing, cleaning her house, negotiating stairs, and walking (Progressing, not met)        Long Term Goals: 8 weeks   Report decreased lumbar pain< or =  4/10 with activities of daily living such as standing, cleaning her house, negotiating stairs, and walking. (Progressing, not met)  Increased manual muscle test for bilateral lower extremities by 1 muscle grade to promote proper pelvic stability to decrease lumbar pain< or =  4/10 with activities of daily living such as standing, cleaning her house, negotiating stairs, and walking.  (Progressing, not met)  Increased flexibility in bilateral piriformis, bilateral hip flexors, bilateral iliotibial bands, and bilateral quadriceps to promote proper pelvic stability to decrease lumbar pain< or =  4/10 with activities of daily living such as standing, cleaning her house, negotiating stairs, and walking. (Progressing, not met)       Plan   Certification Period/Plan of care expiration: 2/12/2024 to 4/8/2024.     Continue pt's current POC to reduce pain and improve ROM, strength, and functional performance of core and B LE to improve participation with ADLs.     Outpatient Physical Therapy 2 times weekly for 8 weeks to include the following interventions: Manual Therapy, Moist Heat/ Ice, Neuromuscular Re-ed, Patient Education, Therapeutic Activities, and Therapeutic Exercise.     Raciel Dumont, PT

## 2024-03-07 ENCOUNTER — CLINICAL SUPPORT (OUTPATIENT)
Dept: REHABILITATION | Facility: OTHER | Age: 54
End: 2024-03-07
Payer: COMMERCIAL

## 2024-03-07 DIAGNOSIS — G89.29 CHRONIC BILATERAL LOW BACK PAIN WITHOUT SCIATICA: ICD-10-CM

## 2024-03-07 DIAGNOSIS — M62.81 MUSCLE WEAKNESS OF LOWER EXTREMITY: Primary | ICD-10-CM

## 2024-03-07 DIAGNOSIS — M54.50 CHRONIC BILATERAL LOW BACK PAIN WITHOUT SCIATICA: ICD-10-CM

## 2024-03-07 PROCEDURE — 97530 THERAPEUTIC ACTIVITIES: CPT | Mod: PN

## 2024-03-07 PROCEDURE — 97112 NEUROMUSCULAR REEDUCATION: CPT | Mod: PN

## 2024-03-13 ENCOUNTER — OFFICE VISIT (OUTPATIENT)
Dept: BARIATRICS | Facility: CLINIC | Age: 54
End: 2024-03-13
Payer: COMMERCIAL

## 2024-03-13 VITALS
HEART RATE: 72 BPM | SYSTOLIC BLOOD PRESSURE: 138 MMHG | WEIGHT: 293 LBS | BODY MASS INDEX: 48.84 KG/M2 | DIASTOLIC BLOOD PRESSURE: 74 MMHG | OXYGEN SATURATION: 97 %

## 2024-03-13 DIAGNOSIS — E66.01 CLASS 3 SEVERE OBESITY DUE TO EXCESS CALORIES WITH SERIOUS COMORBIDITY AND BODY MASS INDEX (BMI) OF 45.0 TO 49.9 IN ADULT: Primary | ICD-10-CM

## 2024-03-13 DIAGNOSIS — G47.33 OSA (OBSTRUCTIVE SLEEP APNEA): ICD-10-CM

## 2024-03-13 DIAGNOSIS — Z71.3 ENCOUNTER FOR WEIGHT LOSS COUNSELING: ICD-10-CM

## 2024-03-13 DIAGNOSIS — R73.03 PREDIABETES: ICD-10-CM

## 2024-03-13 DIAGNOSIS — I10 ESSENTIAL HYPERTENSION: ICD-10-CM

## 2024-03-13 PROCEDURE — 3044F HG A1C LEVEL LT 7.0%: CPT | Mod: CPTII,S$GLB,, | Performed by: STUDENT IN AN ORGANIZED HEALTH CARE EDUCATION/TRAINING PROGRAM

## 2024-03-13 PROCEDURE — 3008F BODY MASS INDEX DOCD: CPT | Mod: CPTII,S$GLB,, | Performed by: STUDENT IN AN ORGANIZED HEALTH CARE EDUCATION/TRAINING PROGRAM

## 2024-03-13 PROCEDURE — 1160F RVW MEDS BY RX/DR IN RCRD: CPT | Mod: CPTII,S$GLB,, | Performed by: STUDENT IN AN ORGANIZED HEALTH CARE EDUCATION/TRAINING PROGRAM

## 2024-03-13 PROCEDURE — 99213 OFFICE O/P EST LOW 20 MIN: CPT | Mod: S$GLB,,, | Performed by: STUDENT IN AN ORGANIZED HEALTH CARE EDUCATION/TRAINING PROGRAM

## 2024-03-13 PROCEDURE — 3078F DIAST BP <80 MM HG: CPT | Mod: CPTII,S$GLB,, | Performed by: STUDENT IN AN ORGANIZED HEALTH CARE EDUCATION/TRAINING PROGRAM

## 2024-03-13 PROCEDURE — 1159F MED LIST DOCD IN RCRD: CPT | Mod: CPTII,S$GLB,, | Performed by: STUDENT IN AN ORGANIZED HEALTH CARE EDUCATION/TRAINING PROGRAM

## 2024-03-13 PROCEDURE — 3075F SYST BP GE 130 - 139MM HG: CPT | Mod: CPTII,S$GLB,, | Performed by: STUDENT IN AN ORGANIZED HEALTH CARE EDUCATION/TRAINING PROGRAM

## 2024-03-13 PROCEDURE — 99999 PR PBB SHADOW E&M-EST. PATIENT-LVL III: CPT | Mod: PBBFAC,,, | Performed by: STUDENT IN AN ORGANIZED HEALTH CARE EDUCATION/TRAINING PROGRAM

## 2024-03-13 RX ORDER — METFORMIN HYDROCHLORIDE 500 MG/1
500 TABLET, EXTENDED RELEASE ORAL 2 TIMES DAILY WITH MEALS
Qty: 180 TABLET | Refills: 0 | Status: SHIPPED | OUTPATIENT
Start: 2024-03-13 | End: 2024-06-11

## 2024-03-13 NOTE — PROGRESS NOTES
Subjective:       Patient ID: Criss Link is a 53 y.o. female.    Chief Complaint: Follow-up, Obesity, and Weight Check    Patient presents for treatment of obesity.     Co-morbidities:   HTN  Prediabetes  CATE  Anxiety and Depression    Negative for thyroid cancer  Negative for pancreatitis    Weight History  Lowest adult weight: around 175 lbs  Highest adult weight: 271 lbs (current weight)     History of Weight Loss Efforts  No prior use of prescription medication for weight loss    Current Physical Activity  No current exercise routine  Sedentary job    Current Eating Habits  Breakfast - oatmeal, eggs, [employer], tea or coffee  Lunch - Healthy Choice meals, [employer] salad bar, [employer] chicken sandwich, nuggets  Dinner - sometimes skips, sometimes mom's cooking, taco bell  Snacks - chocolates, fruit  Beverages - [place] green tea, occasional soft drink, water  Alcohol -  2 Tumblers of Freedom every 1-2 days, more on weekends - has stopped Freedom    Last seen 4/2022. Was up to about 300 lbs.  Was prescribed Ozempic when previously seen    Medical Weight Loss  2/24/2022: 271.7 lbs, BMI 45.2, BFP 49.8%, .2 lbs, SMM 76.9 lbs, BMR 1707 kcal; Ozempic  4/7/2022: 267.4 lbs, BMI 44.5, BFP 49.3%, .8 lbs, SMM 77.2 lbs, BMR 1698 kcal; Ozempic  3/13/2024: 293 lbs, BMI 48.8, BFP 51.8%,  lbs, SMM 80.9 lbs, BMR 1757 kcal      Review of Systems   Constitutional:  Negative for chills and fever.   Respiratory:  Negative for shortness of breath.    Cardiovascular:  Negative for chest pain and palpitations.   Gastrointestinal:  Negative for abdominal pain, nausea and vomiting.   Musculoskeletal:  Positive for arthralgias.   Neurological:  Negative for dizziness and light-headedness.   Psychiatric/Behavioral:  The patient is not nervous/anxious.          Objective:        Latest Reference Range & Units 10/06/21 13:51 11/12/21 09:45   WBC 3.90 - 12.70 K/uL 10.09 8.50   RBC 4.00 - 5.40 M/uL 4.13 4.04   Hemoglobin 12.0  - 16.0 g/dL 10.1 (L) 9.9 (L)   Hematocrit 37.0 - 48.5 % 33.3 (L) 32.6 (L)   MCV 82 - 98 fL 81 (L) 81 (L)   MCH 27.0 - 31.0 pg 24.5 (L) 24.5 (L)   MCHC 32.0 - 36.0 g/dL 30.3 (L) 30.4 (L)   RDW 11.5 - 14.5 % 17.0 (H) 16.3 (H)   Platelets 150 - 450 K/uL 480 (H) 459 (H)   MPV 9.2 - 12.9 fL 10.8 9.4   Gran % 38.0 - 73.0 % 68.7 77.5 (H)   Lymph % 18.0 - 48.0 % 23.9 17.1 (L)   Mono % 4.0 - 15.0 % 6.4 4.2   Eosinophil % 0.0 - 8.0 % 0.3 0.6   Basophil % 0.0 - 1.9 % 0.4 0.2   Immature Granulocytes 0.0 - 0.5 % 0.3 0.4   Gran # (ANC) 1.8 - 7.7 K/uL 6.9 6.6   Lymph # 1.0 - 4.8 K/uL 2.4 1.5   Mono # 0.3 - 1.0 K/uL 0.7 0.4   Eos # 0.0 - 0.5 K/uL 0.0 0.1   Baso # 0.00 - 0.20 K/uL 0.04 0.02   Immature Grans (Abs) 0.00 - 0.04 K/uL 0.03 [1] 0.03 [2]   NRBC 0 /100 WBC 0 0   Differential Method  Automated Automated   Iron 30 - 160 ug/dL 32    TIBC 250 - 450 ug/dL 465 (H)    Saturated Iron 20 - 50 % 7 (L)    Transferrin 200 - 375 mg/dL 314    Ferritin 20.0 - 300.0 ng/mL 24    Sed Rate 0 - 20 mm/Hr  15   Sodium 136 - 145 mmol/L 138 140   Potassium 3.5 - 5.1 mmol/L 3.6 4.0   Chloride 95 - 110 mmol/L 103 103   CO2 23 - 29 mmol/L 20 (L) 30 (H)   Anion Gap 8 - 16 mmol/L 15 7 (L)   BUN 6 - 20 mg/dL 9 11   Creatinine 0.5 - 1.4 mg/dL 0.8 0.8   EGFR if non African American >60 mL/min/1.73 m^2 >60.0 [3] >60 [4]   EGFR if African American >60 mL/min/1.73 m^2 >60.0 >60   Glucose 70 - 110 mg/dL 83 104   Calcium 8.7 - 10.5 mg/dL 9.7 10.1   Alkaline Phosphatase 55 - 135 U/L 70 67   PROTEIN TOTAL 6.0 - 8.4 g/dL 7.7 7.0   Albumin 3.5 - 5.2 g/dL 3.9 3.7   Uric Acid 2.4 - 5.7 mg/dL  5.2   BILIRUBIN TOTAL 0.1 - 1.0 mg/dL 0.6 [5] 0.3 [6]   AST 10 - 40 U/L 23 18   ALT 10 - 44 U/L 15 18   CRP 0.0 - 8.2 mg/L  15.2 (H)   Triglycerides 30 - 150 mg/dL 55 [7]    Cholesterol 120 - 199 mg/dL 200 (H) [8]    HDL 40 - 75 mg/dL 88 (H) [9]    HDL/Cholesterol Ratio 20.0 - 50.0 % 44.0    LDL Cholesterol External 63.0 - 159.0 mg/dL 101.0 [10]    Non-HDL Cholesterol mg/dL 112  [11]    Total Cholesterol/HDL Ratio 2.0 - 5.0  2.3    Vit D, 25-Hydroxy 30 - 96 ng/mL 17 (L) [12]    Hemoglobin A1C External 4.0 - 5.6 % 5.9 (H) [13]    Estimated Avg Glucose 68 - 131 mg/dL 123          Vitals:    03/13/24 1511   BP: 138/74   Pulse: 72         Physical Exam  Vitals reviewed.   Constitutional:       General: She is not in acute distress.     Appearance: Normal appearance. She is obese. She is not ill-appearing, toxic-appearing or diaphoretic.   HENT:      Head: Normocephalic and atraumatic.   Cardiovascular:      Rate and Rhythm: Normal rate.   Pulmonary:      Effort: Pulmonary effort is normal. No respiratory distress.   Skin:     General: Skin is warm and dry.   Neurological:      Mental Status: She is alert and oriented to person, place, and time.         Assessment:       Problem List Items Addressed This Visit       Class 3 severe obesity due to excess calories with serious comorbidity and body mass index (BMI) of 45.0 to 49.9 in adult - Primary    Essential hypertension    Prediabetes    CATE (obstructive sleep apnea)     Other Visit Diagnoses       Encounter for weight loss counseling                  Plan:   - Metformin  mg twice daily    - Log all food and beverage intake with a daily calorie goal of 9212-1919 calories per day    - Moderate intensity aerobic exercise for 15-30 minutes 3-5x/week

## 2024-03-18 ENCOUNTER — TELEPHONE (OUTPATIENT)
Dept: BARIATRICS | Facility: CLINIC | Age: 54
End: 2024-03-18
Payer: COMMERCIAL

## 2024-03-20 ENCOUNTER — TELEPHONE (OUTPATIENT)
Dept: BARIATRICS | Facility: CLINIC | Age: 54
End: 2024-03-20
Payer: COMMERCIAL

## 2024-03-25 ENCOUNTER — TELEPHONE (OUTPATIENT)
Dept: BARIATRICS | Facility: CLINIC | Age: 54
End: 2024-03-25
Payer: COMMERCIAL

## 2024-03-25 ENCOUNTER — PATIENT OUTREACH (OUTPATIENT)
Dept: ADMINISTRATIVE | Facility: HOSPITAL | Age: 54
End: 2024-03-25
Payer: COMMERCIAL

## 2024-03-25 NOTE — PROGRESS NOTES
Health Maintenance Due   Topic Date Due    Colorectal Cancer Screening  Never done    Shingles Vaccine (1 of 2) Never done    Influenza Vaccine (1) 09/01/2023    COVID-19 Vaccine (3 - 2023-24 season) 09/01/2023    Patient new to Dr. Onofre no orders placed. Health Maintenance reviewed updated and Links triggered.   Colorectal due. ([place]) 3/25/24

## 2024-04-04 DIAGNOSIS — J31.0 CHRONIC RHINITIS: ICD-10-CM

## 2024-04-04 NOTE — TELEPHONE ENCOUNTER
No care due was identified.  Health [facility] Embedded Care Due Messages. Reference number: [id number].   4/04/2024 9:42:19 AM CDT

## 2024-04-05 RX ORDER — MONTELUKAST SODIUM 10 MG/1
10 TABLET ORAL NIGHTLY
Qty: 90 TABLET | Refills: 3 | Status: SHIPPED | OUTPATIENT
Start: 2024-04-05

## 2024-04-05 NOTE — TELEPHONE ENCOUNTER
Refill Decision Note   Criss Link  is requesting a refill authorization.  Brief Assessment and Rationale for Refill:  Approve     Medication Therapy Plan:         Comments:     Note composed:10:58 AM 04/05/2024

## 2024-04-08 ENCOUNTER — OFFICE VISIT (OUTPATIENT)
Dept: OBSTETRICS AND GYNECOLOGY | Facility: CLINIC | Age: 54
End: 2024-04-08
Payer: COMMERCIAL

## 2024-04-08 VITALS
SYSTOLIC BLOOD PRESSURE: 122 MMHG | BODY MASS INDEX: 48.82 KG/M2 | HEIGHT: 65 IN | WEIGHT: 293 LBS | DIASTOLIC BLOOD PRESSURE: 62 MMHG

## 2024-04-08 DIAGNOSIS — Z01.419 WELL WOMAN EXAM WITH ROUTINE GYNECOLOGICAL EXAM: Primary | ICD-10-CM

## 2024-04-08 DIAGNOSIS — Z12.11 COLON CANCER SCREENING: ICD-10-CM

## 2024-04-08 PROCEDURE — 99459 PELVIC EXAMINATION: CPT | Mod: S$GLB,,, | Performed by: OBSTETRICS & GYNECOLOGY

## 2024-04-08 PROCEDURE — 99396 PREV VISIT EST AGE 40-64: CPT | Mod: S$GLB,,, | Performed by: OBSTETRICS & GYNECOLOGY

## 2024-04-08 PROCEDURE — 3008F BODY MASS INDEX DOCD: CPT | Mod: CPTII,S$GLB,, | Performed by: OBSTETRICS & GYNECOLOGY

## 2024-04-08 PROCEDURE — 87624 HPV HI-RISK TYP POOLED RSLT: CPT | Performed by: OBSTETRICS & GYNECOLOGY

## 2024-04-08 PROCEDURE — 3074F SYST BP LT 130 MM HG: CPT | Mod: CPTII,S$GLB,, | Performed by: OBSTETRICS & GYNECOLOGY

## 2024-04-08 PROCEDURE — 3044F HG A1C LEVEL LT 7.0%: CPT | Mod: CPTII,S$GLB,, | Performed by: OBSTETRICS & GYNECOLOGY

## 2024-04-08 PROCEDURE — 1159F MED LIST DOCD IN RCRD: CPT | Mod: CPTII,S$GLB,, | Performed by: OBSTETRICS & GYNECOLOGY

## 2024-04-08 PROCEDURE — 88142 CYTOPATH C/V THIN LAYER: CPT | Performed by: OBSTETRICS & GYNECOLOGY

## 2024-04-08 PROCEDURE — 99999 PR PBB SHADOW E&M-EST. PATIENT-LVL IV: CPT | Mod: PBBFAC,,, | Performed by: OBSTETRICS & GYNECOLOGY

## 2024-04-08 PROCEDURE — 1160F RVW MEDS BY RX/DR IN RCRD: CPT | Mod: CPTII,S$GLB,, | Performed by: OBSTETRICS & GYNECOLOGY

## 2024-04-08 PROCEDURE — 3078F DIAST BP <80 MM HG: CPT | Mod: CPTII,S$GLB,, | Performed by: OBSTETRICS & GYNECOLOGY

## 2024-04-08 NOTE — PROGRESS NOTES
Subjective:       Patient ID: Criss Link is a 53 y.o. female.    Chief Complaint:  Well Woman (Last normal pap with Negative HPV was 2020 and last normal mammogram was 2024.)      History of Present Illness  HPI  Annual Exam-Premenopausal  Patient presents for annual exam.  The patient is sexually active. GYN screening history: last pap: approximate date 2020 and was normal and last mammogram: approximate date 2024 and was normal.  The patient wears seatbelts: yes. The patient participates in regular exercise: no. Has the patient ever been transfused or tattooed?: no/yes. The patient reports that there is not domestic violence in her life.     Status post laparoscopic tubal cauterization in     Chronic hypertension  Pre-diabetic    GYN & OB History  Patient's last menstrual period was 2024 (exact date).   Date of Last Pap: 2020    OB History    Para Term  AB Living   2 2 2     2   SAB IAB Ectopic Multiple Live Births           2      # Outcome Date GA Lbr Toney/2nd Weight Sex Delivery Anes PTL Lv   2 Term 04 39w0d  4.077 kg (8 lb 15.8 oz) F Vag-Spont EPI N ROBERT   1 Term 03 39w5d  3.799 kg (8 lb 6 oz) M Vag-Spont EPI N ROBERT      Complications: PIH (pregnancy induced hypertension), third trimester     Past Medical History:   Diagnosis Date    Allergies     Depression     Hypertension     Idiopathic polypoidal choroidal vasculopathy     Prediabetes        Past Surgical History:   Procedure Laterality Date    TUBAL LIGATION  2004    [facility]       Family History   Problem Relation Age of Onset    Heart disease Mother     Depression Mother     Hypertension Father     Diabetes Father     Kidney cancer Father     Cancer Father     No Known Problems Daughter     No Known Problems Son     Lymphoma Maternal Uncle     Lymphoma Maternal Grandmother     Cancer Maternal Grandmother        Social History     Socioeconomic History    Marital status:     Number  of children: 2   Tobacco Use    Smoking status: Never     Passive exposure: Never    Smokeless tobacco: Never   Substance and Sexual Activity    Alcohol use: Yes     Alcohol/week: 2.0 standard drinks of alcohol     Types: 2 Drinks containing 0.5 oz of alcohol per week     Comment: Socially    Drug use: Never    Sexual activity: Yes     Partners: Male     Birth control/protection: Condom   Social History Narrative    . 1 son and 1 daughter (2023 daughter 19 yo). Mother and father live with her. Mother suffers from alcoholism. Sexually active    Tobacco: None    EtOH: 2 mixed drinks 3 times per week     Drug: None    Employment: Works at  [employer] (Works alongside [employer])     Education: 11th grade      Lives with her mother and 2 teenage children     Social Determinants of Health     Financial Resource Strain: Low Risk  (1/13/2024)    Overall Financial Resource Strain (CARDIA)     Difficulty of Paying Living Expenses: Not hard at all   Food Insecurity: No Food Insecurity (1/13/2024)    Hunger Vital Sign     Worried About Running Out of Food in the Last Year: Never true     Ran Out of Food in the Last Year: Never true   Transportation Needs: No Transportation Needs (1/13/2024)    PRAPARE - Transportation     Lack of Transportation (Medical): No     Lack of Transportation (Non-Medical): No   Physical Activity: Inactive (1/13/2024)    Exercise Vital Sign     Days of Exercise per Week: 0 days     Minutes of Exercise per Session: 0 min   Stress: No Stress Concern Present (1/13/2024)    [place] [place] [employer] - Occupational Stress Questionnaire     Feeling of Stress : Only a little   Social Connections: Unknown (1/13/2024)    Social Connection and Isolation Panel [NHANES]     Frequency of Communication with Friends and Family: Once a week     Frequency of Social Gatherings with Friends and Family: Once a week     Active Member of Clubs or Organizations: No     Attends Club or  Organization Meetings: Never     Marital Status:    Housing Stability: Low Risk  (1/13/2024)    Housing Stability Vital Sign     Unable to Pay for Housing in the Last Year: No     Number of Places Lived in the Last Year: 1     Unstable Housing in the Last Year: No       Current Outpatient Medications   Medication Sig Dispense Refill    amLODIPine (NORVASC) 5 MG tablet Take 1 tablet (5 mg total) by mouth once daily. 90 tablet 3    azelastine (ASTELIN) 137 mcg (0.1 %) nasal spray SPRAY 1 SPRAY (137 MCG TOTAL) BY NASAL ROUTE 2 (TWO) TIMES DAILY. 30 mL 5    dorzolamide (TRUSOPT) 2 % ophthalmic solution Place 1 drop into both eyes 2 (two) times daily.      ferrous gluconate (FERGON) 324 MG tablet Take 1 tablet (324 mg total) by mouth daily with breakfast. 90 tablet 3    meloxicam (MOBIC) 15 MG tablet Take 1 tablet (15 mg total) by mouth once daily. 90 tablet 3    metFORMIN (GLUCOPHAGE-XR) 500 MG ER 24hr tablet Take 1 tablet (500 mg total) by mouth 2 (two) times daily with meals. 180 tablet 0    montelukast (SINGULAIR) 10 mg tablet TAKE 1 TABLET BY MOUTH EVERY DAY IN THE EVENING 90 tablet 3    sertraline (ZOLOFT) 100 MG tablet Take 1 tablet (100 mg total) by mouth once daily. 90 tablet 3    spironolactone (ALDACTONE) 50 MG tablet Take 1 tablet (50 mg total) by mouth once daily. 90 tablet 3    tiZANidine (ZANAFLEX) 4 MG tablet TAKE 1 TABLET (4 MG TOTAL) BY MOUTH NIGHTLY AS NEEDED (BACK PAIN). 90 tablet 1     No current facility-administered medications for this visit.       Review of patient's allergies indicates:  No Known Allergies      Review of Systems  Review of Systems   Constitutional:  Negative for activity change, appetite change, chills, fatigue, fever and unexpected weight change.   HENT:  Negative for mouth sores.    Respiratory:  Negative for cough, shortness of breath and wheezing.    Cardiovascular:  Negative for chest pain and palpitations.   Gastrointestinal:  Negative for abdominal pain, bloating,  blood in stool, constipation, nausea and vomiting.   Endocrine: Negative for diabetes and hot flashes.   Genitourinary:  Negative for dysmenorrhea, dyspareunia, dysuria, frequency, hematuria, menorrhagia, menstrual problem, pelvic pain, urgency, vaginal bleeding, vaginal discharge, vaginal pain, urinary incontinence, postcoital bleeding and vaginal odor.   Musculoskeletal:  Negative for back pain and myalgias.   Integumentary:  Negative for rash, breast mass and nipple discharge.   Neurological:  Negative for seizures and headaches.   Psychiatric/Behavioral:  Negative for depression and sleep disturbance. The patient is not nervous/anxious.    Breast: Negative for mass, mastodynia and nipple discharge          Objective:    Physical Exam:   Constitutional: She appears well-developed and well-nourished. No distress.   BMI of 48.79    HENT:   Head: Normocephalic and atraumatic.    Eyes: EOM are normal.      Pulmonary/Chest: Effort normal. No respiratory distress.   Breasts: Non-tender, no engorgement, no masses, no retraction, no discharge. Negative for lymphadenopathy.         Abdominal: Soft. She exhibits no distension. There is no abdominal tenderness. There is no rebound and no guarding.   Acanthosis nigricans in abdomen and groins.     Genitourinary:    Vagina and uterus normal.   No vaginal discharge in the vagina.    Genitourinary Comments: Vulva without any obvious lesions.  Urethral meatus normal size and location without any lesion.  Urethra is non-tender without stricture or discharge.  Bladder is non-tender.  Vaginal vault with good support.  Minimal white discharge noted.  No obvious lesion.  Normal rugation.  Cervix is without any cervical motion tenderness.  No obvious lesion.  Uterus is small, non-tender, normal contour.  Adnexa is without any masses or tenderness.  Perineum without obvious lesion.               Musculoskeletal: Normal range of motion.       Neurological: She is alert.    Skin: Skin is  warm and dry.    Psychiatric: She has a normal mood and affect.          Assessment:        1. Well woman exam with routine gynecological exam    2. Colon cancer screening              Plan:          I have discussed with the patient her condition.  Monthly breast examination was instructed, discussed, and encouraged.  Patient was encouraged to consume a low-calorie, low fat diet, and to increase of physical activity.  Healthy habits encouraged.  A Pap smear was not performed according to the USPSTF recommendations.  Mammogram was ordered because of the combination of her age and risk factors, according to ACOG guidelines.  Gonorrhea and Chlamydia testing not performed;  HIV test not offered, again according to guidelines.  Colonoscopy discussed according to ACS guideline.   Care Gaps addressed.  She should get her shingle vaccine soon  She will come back to see me in one year for her annual visit.  She can come back to see me sooner as necessary.  All of her questions were answered appropriately to her satisfaction.       ** A female chaperone, Krista Martinez, was present for the pelvic exam

## 2024-04-09 ENCOUNTER — PATIENT OUTREACH (OUTPATIENT)
Dept: ADMINISTRATIVE | Facility: HOSPITAL | Age: 54
End: 2024-04-09
Payer: COMMERCIAL

## 2024-04-09 NOTE — PROGRESS NOTES
Population Health Chart Review & Patient Outreach Details      Additional [facility] Health Notes:    Pt declined flu vaccine 04/08/2024.      Updates Requested / Reviewed:      Updated Care Coordination Note, Care Everywhere, and Immunizations Reconciliation Completed or Queried: [place]         Health Maintenance Topics Overdue:      [facility] Score: 0     Patient is not due for any topics at this time.                       Health Maintenance Topic(s) Outreach Outcomes & Actions Taken:

## 2024-04-15 ENCOUNTER — CLINICAL SUPPORT (OUTPATIENT)
Dept: BARIATRICS | Facility: CLINIC | Age: 54
End: 2024-04-15
Payer: COMMERCIAL

## 2024-04-15 ENCOUNTER — OFFICE VISIT (OUTPATIENT)
Dept: BARIATRICS | Facility: CLINIC | Age: 54
End: 2024-04-15
Payer: COMMERCIAL

## 2024-04-15 VITALS
HEART RATE: 76 BPM | HEIGHT: 64 IN | DIASTOLIC BLOOD PRESSURE: 84 MMHG | OXYGEN SATURATION: 98 % | WEIGHT: 288.56 LBS | TEMPERATURE: 98 F | SYSTOLIC BLOOD PRESSURE: 132 MMHG | BODY MASS INDEX: 49.26 KG/M2

## 2024-04-15 DIAGNOSIS — I10 ESSENTIAL HYPERTENSION: ICD-10-CM

## 2024-04-15 DIAGNOSIS — M25.569 CHRONIC KNEE PAIN, UNSPECIFIED LATERALITY: Primary | ICD-10-CM

## 2024-04-15 DIAGNOSIS — G47.33 OSA (OBSTRUCTIVE SLEEP APNEA): ICD-10-CM

## 2024-04-15 DIAGNOSIS — Z71.3 DIETARY COUNSELING AND SURVEILLANCE: ICD-10-CM

## 2024-04-15 DIAGNOSIS — G89.29 CHRONIC KNEE PAIN, UNSPECIFIED LATERALITY: Primary | ICD-10-CM

## 2024-04-15 DIAGNOSIS — E66.01 CLASS 3 SEVERE OBESITY DUE TO EXCESS CALORIES WITH SERIOUS COMORBIDITY AND BODY MASS INDEX (BMI) OF 45.0 TO 49.9 IN ADULT: ICD-10-CM

## 2024-04-15 DIAGNOSIS — I10 ESSENTIAL HYPERTENSION: Primary | ICD-10-CM

## 2024-04-15 PROCEDURE — 3008F BODY MASS INDEX DOCD: CPT | Mod: CPTII,S$GLB,, | Performed by: NURSE PRACTITIONER

## 2024-04-15 PROCEDURE — 3075F SYST BP GE 130 - 139MM HG: CPT | Mod: CPTII,S$GLB,, | Performed by: NURSE PRACTITIONER

## 2024-04-15 PROCEDURE — 1159F MED LIST DOCD IN RCRD: CPT | Mod: CPTII,S$GLB,, | Performed by: NURSE PRACTITIONER

## 2024-04-15 PROCEDURE — 99999 PR PBB SHADOW E&M-EST. PATIENT-LVL I: CPT | Mod: PBBFAC,,, | Performed by: DIETITIAN, REGISTERED

## 2024-04-15 PROCEDURE — 3079F DIAST BP 80-89 MM HG: CPT | Mod: CPTII,S$GLB,, | Performed by: NURSE PRACTITIONER

## 2024-04-15 PROCEDURE — 3044F HG A1C LEVEL LT 7.0%: CPT | Mod: CPTII,S$GLB,, | Performed by: NURSE PRACTITIONER

## 2024-04-15 PROCEDURE — 99205 OFFICE O/P NEW HI 60 MIN: CPT | Mod: S$GLB,,, | Performed by: NURSE PRACTITIONER

## 2024-04-15 PROCEDURE — 99499 UNLISTED E&M SERVICE: CPT | Mod: S$GLB,,, | Performed by: DIETITIAN, REGISTERED

## 2024-04-15 PROCEDURE — 1160F RVW MEDS BY RX/DR IN RCRD: CPT | Mod: CPTII,S$GLB,, | Performed by: NURSE PRACTITIONER

## 2024-04-15 PROCEDURE — 99999 PR PBB SHADOW E&M-EST. PATIENT-LVL V: CPT | Mod: PBBFAC,,, | Performed by: NURSE PRACTITIONER

## 2024-04-15 NOTE — PROGRESS NOTES
BARIATRIC NEW PATIENT EVALUATION    CHIEF COMPLAINT:   Morbid obesity, body mass index is 49.53 kg/m². and inability to lose weight.    HPI:  Criss Link is a 53 y.o. morbidly obese female. Her current body mass index is 49.53 kg/m². She has multiple associated comorbidities including essential hypertension, CATE on CPAP, and depression.  She has struggled with excess weight since 2005.  Her highest adult weight was 288 lbs at age 53, and her lowest adult weight was 150 lbs at age 20.  The patient has tried calorie counting and ozempic, metformin  .  The patient was most successful with exercise and calorie counting with a weight loss of 45lbs.  Her current exercise includes none 0 times a week. She denies any history of eating disorder such as anorexia, bulimia, or taking laxatives for weight loss, and denies any addiction including illicit substances, alcohol, or gambling.  Patient states she has a good  support system.  She lives with family.  She is currently employed   .  She  denies a history of GERD.  The patient's goal is 200  lbs.    ESS: Score of 6, reviewed 04/15/2024.  Does not need Sleep Study.    Pre op weight-288  IBW-141    PAST MEDICAL HISTORY:  Past Medical History:   Diagnosis Date    Allergies     Depression     Hypertension     Idiopathic polypoidal choroidal vasculopathy     Prediabetes        PAST SURGICAL HISTORY:  Past Surgical History:   Procedure Laterality Date    TUBAL LIGATION  12/30/2004    [facility]       FAMILY HISTORY:  Family History   Problem Relation Name Age of Onset    Heart disease Mother Adrianne Link     Depression Mother Adrianne Link     Hypertension Father Manuel Link     Diabetes Father Manuel Link     Kidney cancer Father Manuel Link     Cancer Father Manuel Link     No Known Problems Daughter      No Known Problems Son      Lymphoma Maternal Uncle      Lymphoma Maternal Grandmother      Cancer Maternal Grandmother          SOCIAL HISTORY:  Social History      Socioeconomic History    Marital status:     Number of children: 2   Tobacco Use    Smoking status: Never     Passive exposure: Never    Smokeless tobacco: Never   Substance and Sexual Activity    Alcohol use: Yes     Alcohol/week: 2.0 standard drinks of alcohol     Types: 2 Drinks containing 0.5 oz of alcohol per week     Comment: Socially    Drug use: Never    Sexual activity: Yes     Partners: Male     Birth control/protection: Condom   Social History Narrative    . 1 son and 1 daughter (2023 daughter 17 yo). Mother and father live with her. Mother suffers from alcoholism. Sexually active    Tobacco: None    EtOH: 2 mixed drinks 3 times per week     Drug: None    Employment: Works at  [employer] (Works alongside [employer]     Education: 11th grade      Lives with her mother and 2 teenage children     Social Determinants of Health     Financial Resource Strain: Low Risk  (1/13/2024)    Overall Financial Resource Strain (CARDIA)     Difficulty of Paying Living Expenses: Not hard at all   Food Insecurity: No Food Insecurity (1/13/2024)    Hunger Vital Sign     Worried About Running Out of Food in the Last Year: Never true     Ran Out of Food in the Last Year: Never true   Transportation Needs: No Transportation Needs (1/13/2024)    PRAPARE - Transportation     Lack of Transportation (Medical): No     Lack of Transportation (Non-Medical): No   Physical Activity: Inactive (1/13/2024)    Exercise Vital Sign     Days of Exercise per Week: 0 days     Minutes of Exercise per Session: 0 min   Stress: No Stress Concern Present (1/13/2024)    [place] [place] [employer] - Occupational Stress Questionnaire     Feeling of Stress : Only a little   Social Connections: Unknown (1/13/2024)    Social Connection and Isolation Panel [NHANES]     Frequency of Communication with Friends and Family: Once a week     Frequency of Social Gatherings with Friends and Family: Once a week     Active  Member of Clubs or Organizations: No     Attends Club or Organization Meetings: Never     Marital Status:    Housing Stability: Low Risk  (1/13/2024)    Housing Stability Vital Sign     Unable to Pay for Housing in the Last Year: No     Number of Places Lived in the Last Year: 1     Unstable Housing in the Last Year: No       MEDICATIONS:    Current Outpatient Medications:     amLODIPine (NORVASC) 5 MG tablet, Take 1 tablet (5 mg total) by mouth once daily., Disp: 90 tablet, Rfl: 3    azelastine (ASTELIN) 137 mcg (0.1 %) nasal spray, SPRAY 1 SPRAY (137 MCG TOTAL) BY NASAL ROUTE 2 (TWO) TIMES DAILY., Disp: 30 mL, Rfl: 5    dorzolamide (TRUSOPT) 2 % ophthalmic solution, Place 1 drop into both eyes 2 (two) times daily., Disp: , Rfl:     ferrous gluconate (FERGON) 324 MG tablet, Take 1 tablet (324 mg total) by mouth daily with breakfast., Disp: 90 tablet, Rfl: 3    meloxicam (MOBIC) 15 MG tablet, Take 1 tablet (15 mg total) by mouth once daily., Disp: 90 tablet, Rfl: 3    metFORMIN (GLUCOPHAGE-XR) 500 MG ER 24hr tablet, Take 1 tablet (500 mg total) by mouth 2 (two) times daily with meals., Disp: 180 tablet, Rfl: 0    montelukast (SINGULAIR) 10 mg tablet, TAKE 1 TABLET BY MOUTH EVERY DAY IN THE EVENING, Disp: 90 tablet, Rfl: 3    sertraline (ZOLOFT) 100 MG tablet, Take 1 tablet (100 mg total) by mouth once daily., Disp: 90 tablet, Rfl: 3    spironolactone (ALDACTONE) 50 MG tablet, Take 1 tablet (50 mg total) by mouth once daily., Disp: 90 tablet, Rfl: 3    tiZANidine (ZANAFLEX) 4 MG tablet, TAKE 1 TABLET (4 MG TOTAL) BY MOUTH NIGHTLY AS NEEDED (BACK PAIN)., Disp: 90 tablet, Rfl: 1    ALLERGIES:  Review of patient's allergies indicates:  No Known Allergies    Review of Systems   Constitutional:  Negative for chills and fever.   HENT:  Negative for ear pain, nosebleeds and sore throat.    Eyes:  Negative for blurred vision and double vision.   Respiratory:  Negative for cough and shortness of breath.   "  Cardiovascular:  Negative for chest pain, palpitations, orthopnea, claudication and leg swelling.   Gastrointestinal:  Negative for abdominal pain, constipation, diarrhea, heartburn, nausea and vomiting.   Genitourinary:  Negative for dysuria and urgency.   Musculoskeletal:  Positive for joint pain. Negative for back pain.   Skin:  Negative for rash.   Neurological:  Negative for dizziness, tingling, focal weakness and headaches.   Endo/Heme/Allergies:  Does not bruise/bleed easily.   Psychiatric/Behavioral:  Negative for depression and suicidal ideas.        Vitals:    04/15/24 0930   BP: 132/84   Pulse: 76   Temp: 98.4 °F (36.9 °C)   TempSrc: Oral   SpO2: 98%   Weight: 130.9 kg (288 lb 9.3 oz)   Height: 5' 4" (1.626 m)   PainSc:   5   PainLoc: Knee       Physical Exam  Vitals and nursing note reviewed.   Constitutional:       Appearance: She is well-developed. She is morbidly obese.   HENT:      Head: Normocephalic.      Nose: Nose normal.      Mouth/Throat:      Mouth: Mucous membranes are moist.   Eyes:      Extraocular Movements: Extraocular movements intact.   Cardiovascular:      Rate and Rhythm: Normal rate and regular rhythm.      Heart sounds: Normal heart sounds.   Pulmonary:      Effort: Pulmonary effort is normal.      Breath sounds: Normal breath sounds.   Abdominal:      General: Bowel sounds are normal.      Palpations: Abdomen is soft.   Musculoskeletal:         General: Normal range of motion.      Cervical back: Normal range of motion.   Skin:     General: Skin is warm and dry.      Capillary Refill: Capillary refill takes less than 2 seconds.   Neurological:      Mental Status: She is alert and oriented to person, place, and time.   Psychiatric:         Mood and Affect: Mood normal.          DIAGNOSIS:  1. Morbid obesity, body mass index is 49.53 kg/m². and inability to lose weight.  2. Co-morbidities: essential hypertension, ACTE on CPAP, and depression    PLAN:  The patient is a good candidate " for Bariatric Surgery. The patient is interested in laparoscopic sleeve gastrectomy with Dr. Rosas. The surgery and post-op care was discussed in detail with the patient. All questions were answered.    The patient understands that bariatric surgery is a tool to aid in weight loss and that they need to be committed to the diet and exercise post-operatively for successful weight loss. Discussed with patient that bariatric surgery is not the easy way out and that it will take plenty of dedication on the patient's part to be successful. Also discussed the possibility of weight regain if the patient strays from the diet guidelines or exercise requirements. Patient verbalized understanding and wishes to proceed with the work-up.    Estimated Goal weight is 210-215 lbs.    Discussed hyper fertility and no NSAIDS post op     ORDERS:  1. Chest X-Ray, EKG, and Stress test  2. Psychological Consult and Bariatric Dietician Consult  3. Bariatric Labs: Per orders.  4. Ortho referral for knee pain and no NSAIDS post op    PCP: Leanna Galeano DO  RTC: As scheduled.      This includes 60 mins face to face time and non-face to face time preparing to see the patient (eg, review of tests), obtaining and/or reviewing separately obtained history, documenting clinical information in the electronic or other health record, independently interpreting results and communicating results to the patient/family/caregiver, or care coordinator.

## 2024-04-15 NOTE — PATIENT INSTRUCTIONS
Prior to surgery you will need to complete:  - Dietitian consult and follow up appointments as needed  - Labs  - Chest X-ray  - EKG  - Psychological evaluation, Please call psychiatry [phone] to schedule  - Stress test    In preparation for bariatric surgery, please complete the following:   Discuss your current medications with your primary care provider, remember your medications will need to be crushed, chewable, or in liquid form for the first 2-4 weeks after a gastric bypass or sleeve.  For a gastric band, your medications will need to be crushed indefinitely.    If you take a blood thinner such as: Coumadin (warfarin), Pradaxa (dabigatran), or Plavix (clopidogrel), you will need to speak with your prescribing provider on how or if this medication can be stopped before surgery.   If you take a medication for depression or anxiety, remember to discuss this with the psychologist or psychiatrist that you see.   If you take medication for arthritis on a daily basis that is considered a non-steroidal anti-inflammatory (NSAID), please discuss with your prescribing physician an alternative medication.  After having gastric bypass or gastric sleeve, this group of medications is not appropriate to take due to increased risk of bleeding stomach ulcers.      DEFINITIONS  Appointments: Pre-scheduled meetings or consultations with any physician, advanced practice provider, dietitian, or psychologist, and labs, imaging studies, sleep studies, etc.   Late cancellation: Cancelling an appointment 24-48 hours prior to scheduled time.  No-Show appointment:  is when   You do NOT arrive to your appointment at the time its scheduled.  You call to cancel or cancel via [facility] less than 24 hours in advance of your scheduled appointment  You show up 15 minutes AFTER your scheduled appointment time without any notification of being late.     POLICY  You are allowed up to 3 cancellations for appointments.   After 3 cancellations  your case will be placed on hold for 2 months and after that time you can resume the program.   You are allowed only 1 no-show for an appointment.   You will be re-scheduled one time and if there is a 2nd no-show at any point, your case will be placed on hold for 3 months.  After 3 months you can resume the program.     Upon resuming the program after being placed on hold for either above mentioned reasons, if you have a late cancel or no show for any appointment, the bariatric team will review if youre an appropriate candidate for surgery at the monthly meeting.

## 2024-04-15 NOTE — PROGRESS NOTES
"NUTRITIONAL CONSULT    Referring Physician: Edyta Rosas M.D.   Reason for MNT Referral: Initial assessment for sleeve gastrectomy work-up    PAST MEDICAL HISTORY:   53 y.o. female  .  Weight history includes Her highest adult weight was 300 lbs at age 53, and her lowest adult weight was 150 lbs at age 20  Dieting attempts include: Dietary changes and exercise, Ozempic, Metformin. The patient was most successful with exercise and dietary changes with a weight loss of 30 lbs (from 250 to 220).    Past Medical History:   Diagnosis Date    Allergies     Depression     Hypertension     Idiopathic polypoidal choroidal vasculopathy     Prediabetes      CLINICAL DATA:  53 y.o.-year-old Black or  female.  Height: 5' 4"  Weight: 288 lbs  IBW: 138 lbs  BMI: 49.53  The patient's goal weight (50% EBW): 213 lbs  Personal goal weight: 200 lbs    Goal for Bariatric Surgery: to improve health, to improve quality of life, to lose weight, relieve knee pain, and prolong life    DAILY NUTRITIONAL NEEDS: pre-op nutritional bariatric guidelines to promote weight loss  4625-5688 Calories    Grams Protein    NUTRITION & HEALTH HISTORY:  Greatest challenge: starchy CHO, fast food, high fat/calorie foods    Current diet recall:     B: Grits, eggs, sometimes fried fish, or 3 sausage links from [employer] corner store  Coffee with cream and Equal  S: Orange, Medley snack pack with apples, pretzels, and caramel chocolates  L: Rouses -  pasta, with crawfish etouffee or flaquita or salad with lettuce, mushrooms, tomatoes, sometimes with eggs crab salad, or chicken salad, with a basil dressing or grilled or baked chicken in a gravy with broccoli/cauliflower/carrot medley and pasta  D: sometimes skips maybe 2 x week; gets fast food/take out burger, tacos, salad    Current Diet:  Meal pattern: 2-3 meals/day  Protein supplements: 0. Has used Lean Body, [employer], and other protein shakes and powders  Snackin / " day  Vegetables: Likes a variety. Eats daily.   Fruits: Likes a variety. Eats daily.Orange, pears, mangoes, grapes, apples, blackberries, bananas  Beverages: water and coffee without sugar  Dining out: Daily. Mostly fast food and take-out. Corner store, [employer], [employer] salad  Cooking at home: Never. Mostly baked and stovetop meat, fish, and starchy CHO. Pasta/lasagna, salmon    Food Allergies:   None reported    Vitamins / Minerals / Herbs:   Green tea tablet    Labs:   No recent    Exercise:  Past exercise: Adequate  Gym 7 days/week    Current exercise: None  Completed PT for back pain    Restrictions to exercise: knee pain, back pain, shortness of breath    Social:  Works regular daytime shifts. Mon-Fri 8 am-5 pm  Lives with two adult children, mother, father.  Grocery shopping and food prep mother and PT.  Patient believes the household will be supportive after surgery.  Alcohol: Cutting back. Once/week, 2 drinks  Smoking: None.    ASSESSMENT:  Patient reports attempts at weight loss, only to regain lost weight.  Patient demonstrated knowledge of healthy eating behaviors and exercise patterns; admits to not eating healthy and not exercising at this point.  Patient states willingness to change lifestyle and make behavior modifications     Barriers to Education: none    Stage of change: determination    NUTRITION DIAGNOSIS:    Morbid Obesity related to Excessive carbohydrate intake, Excessive calorie intake, and Physical inactivity as evidence by BMI.    BARIATRIC DIET DISCUSSION/PLAN:  Discussed diet after surgery and related to patient's food record.  Reviewed nutrition guidelines for before and after surgery.  Answered all questions.  Discussed strength training  Discussed portion sizing/control  Discussed high protein snacks  Work on gradually cutting back on starchy CHO in the diet.  Start including protein supplements in the diet plan daily.  Reduce frequency of dining out.  Increase  exercise.    PT Plan  Include more vegetables  More physical activity - walk instead of elevator  Protein shake in the evening/instead of dinner out    RECOMMENDATIONS:  Pt is a potential candidate for bariatric surgery.    Follow up in one month.  Needs additional visits with RD.    Patient verbalized understanding.    Communicated nutrition plan with bariatric team.    SESSION TIME:  60 minutes

## 2024-04-16 LAB
FINAL PATHOLOGIC DIAGNOSIS: NORMAL
Lab: NORMAL

## 2024-04-17 DIAGNOSIS — M25.561 RIGHT KNEE PAIN, UNSPECIFIED CHRONICITY: Primary | ICD-10-CM

## 2024-04-20 ENCOUNTER — HOSPITAL ENCOUNTER (OUTPATIENT)
Dept: RADIOLOGY | Facility: HOSPITAL | Age: 54
Discharge: HOME | End: 2024-04-20
Attending: NURSE PRACTITIONER
Payer: COMMERCIAL

## 2024-04-20 DIAGNOSIS — E66.01 CLASS 3 SEVERE OBESITY DUE TO EXCESS CALORIES WITH SERIOUS COMORBIDITY AND BODY MASS INDEX (BMI) OF 45.0 TO 49.9 IN ADULT: ICD-10-CM

## 2024-04-20 DIAGNOSIS — I10 ESSENTIAL HYPERTENSION: ICD-10-CM

## 2024-04-20 DIAGNOSIS — G47.33 OSA (OBSTRUCTIVE SLEEP APNEA): ICD-10-CM

## 2024-04-20 PROCEDURE — 71046 X-RAY EXAM CHEST 2 VIEWS: CPT | Mod: 26,,, | Performed by: RADIOLOGY

## 2024-04-20 PROCEDURE — 71046 X-RAY EXAM CHEST 2 VIEWS: CPT | Mod: TC,FY

## 2024-04-22 ENCOUNTER — OFFICE VISIT (OUTPATIENT)
Dept: SPORTS MEDICINE | Facility: CLINIC | Age: 54
End: 2024-04-22
Payer: COMMERCIAL

## 2024-04-22 ENCOUNTER — APPOINTMENT (OUTPATIENT)
Dept: RADIOLOGY | Facility: OTHER | Age: 54
End: 2024-04-22
Attending: PHYSICIAN ASSISTANT
Payer: COMMERCIAL

## 2024-04-22 VITALS
HEART RATE: 69 BPM | SYSTOLIC BLOOD PRESSURE: 128 MMHG | DIASTOLIC BLOOD PRESSURE: 60 MMHG | BODY MASS INDEX: 50.02 KG/M2 | WEIGHT: 293 LBS | HEIGHT: 64 IN

## 2024-04-22 DIAGNOSIS — G89.29 CHRONIC PAIN OF RIGHT KNEE: Primary | ICD-10-CM

## 2024-04-22 DIAGNOSIS — M25.561 CHRONIC PAIN OF RIGHT KNEE: Primary | ICD-10-CM

## 2024-04-22 DIAGNOSIS — M17.11 PRIMARY OSTEOARTHRITIS OF RIGHT KNEE: ICD-10-CM

## 2024-04-22 DIAGNOSIS — E66.01 CLASS 3 SEVERE OBESITY DUE TO EXCESS CALORIES WITH SERIOUS COMORBIDITY AND BODY MASS INDEX (BMI) OF 45.0 TO 49.9 IN ADULT: ICD-10-CM

## 2024-04-22 DIAGNOSIS — M25.561 RIGHT KNEE PAIN, UNSPECIFIED CHRONICITY: ICD-10-CM

## 2024-04-22 PROCEDURE — 1159F MED LIST DOCD IN RCRD: CPT | Mod: CPTII,S$GLB,, | Performed by: PHYSICIAN ASSISTANT

## 2024-04-22 PROCEDURE — 1160F RVW MEDS BY RX/DR IN RCRD: CPT | Mod: CPTII,S$GLB,, | Performed by: PHYSICIAN ASSISTANT

## 2024-04-22 PROCEDURE — 3078F DIAST BP <80 MM HG: CPT | Mod: CPTII,S$GLB,, | Performed by: PHYSICIAN ASSISTANT

## 2024-04-22 PROCEDURE — 73562 X-RAY EXAM OF KNEE 3: CPT | Mod: 26,59,LT, | Performed by: RADIOLOGY

## 2024-04-22 PROCEDURE — 99204 OFFICE O/P NEW MOD 45 MIN: CPT | Mod: S$GLB,,, | Performed by: PHYSICIAN ASSISTANT

## 2024-04-22 PROCEDURE — 73564 X-RAY EXAM KNEE 4 OR MORE: CPT | Mod: 26,RT,, | Performed by: RADIOLOGY

## 2024-04-22 PROCEDURE — 3074F SYST BP LT 130 MM HG: CPT | Mod: CPTII,S$GLB,, | Performed by: PHYSICIAN ASSISTANT

## 2024-04-22 PROCEDURE — 99999 PR PBB SHADOW E&M-EST. PATIENT-LVL IV: CPT | Mod: PBBFAC,,, | Performed by: PHYSICIAN ASSISTANT

## 2024-04-22 PROCEDURE — 73564 X-RAY EXAM KNEE 4 OR MORE: CPT | Mod: TC,PN,RT

## 2024-04-22 PROCEDURE — 3044F HG A1C LEVEL LT 7.0%: CPT | Mod: CPTII,S$GLB,, | Performed by: PHYSICIAN ASSISTANT

## 2024-04-22 PROCEDURE — 3008F BODY MASS INDEX DOCD: CPT | Mod: CPTII,S$GLB,, | Performed by: PHYSICIAN ASSISTANT

## 2024-04-22 NOTE — PROGRESS NOTES
NEW PATIENT    HISTORY OF PRESENT ILLNESS   53 y.o. Female with a history of right knee pain who works as a  at the [employer].  She complains today of having intermittent discomfort over the past 2 years.  She has stiffness with inactivity and increased pain with physical activity, particularly when walking up or down stairs.  She has done physical therapy and has had injections in the past, but it has been several years.  She denies having any precipitating injury or trauma.    She would like to become more active but feels as though the knee is inhibiting her.        - mechanical symptoms, - instability    Is affecting ADLs.  Pain is 8/10 at it's worst.        PAST MEDICAL HISTORY    Past Medical History:   Diagnosis Date    Allergies     Depression     Hypertension     Idiopathic polypoidal choroidal vasculopathy     Prediabetes        PAST SURGICAL HISTORY     Past Surgical History:   Procedure Laterality Date    TUBAL LIGATION  12/30/2004    [facility]       FAMILY HISTORY    Family History   Problem Relation Name Age of Onset    Heart disease Mother Adrianne Link     Depression Mother Adrianne Link     Hypertension Father Manuel Link     Diabetes Father Manuel Link     Kidney cancer Father Manuel Link     Cancer Father Manuel Link     No Known Problems Daughter      No Known Problems Son      Lymphoma Maternal Uncle      Lymphoma Maternal Grandmother      Cancer Maternal Grandmother         SOCIAL HISTORY    Social History     Socioeconomic History    Marital status:     Number of children: 2   Tobacco Use    Smoking status: Never     Passive exposure: Never    Smokeless tobacco: Never   Substance and Sexual Activity    Alcohol use: Yes     Alcohol/week: 2.0 standard drinks of alcohol     Types: 2 Drinks containing 0.5 oz of alcohol per week     Comment: Socially    Drug use: Never    Sexual activity: Yes     Partners: Male     Birth control/protection: Condom   Social History  Narrative    . 1 son and 1 daughter (2023 daughter 17 yo). Mother and father live with her. Mother suffers from alcoholism. Sexually active    Tobacco: None    EtOH: 2 mixed drinks 3 times per week     Drug: None    Employment: Works at  [employer]Works alongside [employer])     Education: 11th grade      Lives with her mother and 2 teenage children     Social Determinants of Health     Financial Resource Strain: Low Risk  (1/13/2024)    Overall Financial Resource Strain (CARDIA)     Difficulty of Paying Living Expenses: Not hard at all   Food Insecurity: No Food Insecurity (1/13/2024)    Hunger Vital Sign     Worried About Running Out of Food in the Last Year: Never true     Ran Out of Food in the Last Year: Never true   Transportation Needs: No Transportation Needs (1/13/2024)    PRAPARE - Transportation     Lack of Transportation (Medical): No     Lack of Transportation (Non-Medical): No   Physical Activity: Inactive (1/13/2024)    Exercise Vital Sign     Days of Exercise per Week: 0 days     Minutes of Exercise per Session: 0 min   Stress: No Stress Concern Present (1/13/2024)    [place] [place] [employer] - Occupational Stress Questionnaire     Feeling of Stress : Only a little   Social Connections: Unknown (1/13/2024)    Social Connection and Isolation Panel [NHANES]     Frequency of Communication with Friends and Family: Once a week     Frequency of Social Gatherings with Friends and Family: Once a week     Active Member of Clubs or Organizations: No     Attends Club or Organization Meetings: Never     Marital Status:    Housing Stability: Low Risk  (1/13/2024)    Housing Stability Vital Sign     Unable to Pay for Housing in the Last Year: No     Number of Places Lived in the Last Year: 1     Unstable Housing in the Last Year: No       MEDICATIONS      Current Outpatient Medications:     amLODIPine (NORVASC) 5 MG tablet, Take 1 tablet (5 mg total) by mouth once daily.,  Disp: 90 tablet, Rfl: 3    azelastine (ASTELIN) 137 mcg (0.1 %) nasal spray, SPRAY 1 SPRAY (137 MCG TOTAL) BY NASAL ROUTE 2 (TWO) TIMES DAILY., Disp: 30 mL, Rfl: 5    dorzolamide (TRUSOPT) 2 % ophthalmic solution, Place 1 drop into both eyes 2 (two) times daily., Disp: , Rfl:     ferrous gluconate (FERGON) 324 MG tablet, Take 1 tablet (324 mg total) by mouth daily with breakfast., Disp: 90 tablet, Rfl: 3    meloxicam (MOBIC) 15 MG tablet, Take 1 tablet (15 mg total) by mouth once daily., Disp: 90 tablet, Rfl: 3    metFORMIN (GLUCOPHAGE-XR) 500 MG ER 24hr tablet, Take 1 tablet (500 mg total) by mouth 2 (two) times daily with meals., Disp: 180 tablet, Rfl: 0    montelukast (SINGULAIR) 10 mg tablet, TAKE 1 TABLET BY MOUTH EVERY DAY IN THE EVENING, Disp: 90 tablet, Rfl: 3    sertraline (ZOLOFT) 100 MG tablet, Take 1 tablet (100 mg total) by mouth once daily., Disp: 90 tablet, Rfl: 3    spironolactone (ALDACTONE) 50 MG tablet, Take 1 tablet (50 mg total) by mouth once daily., Disp: 90 tablet, Rfl: 3    tiZANidine (ZANAFLEX) 4 MG tablet, TAKE 1 TABLET (4 MG TOTAL) BY MOUTH NIGHTLY AS NEEDED (BACK PAIN)., Disp: 90 tablet, Rfl: 1    ALLERGIES     Review of patient's allergies indicates:  No Known Allergies      REVIEW OF SYSTEMS   Constitution: Negative. Negative for chills, fever and night sweats.   HENT: Negative for congestion and headaches.    Eyes: Negative for blurred vision, left vision loss and right vision loss.   Cardiovascular: Negative for chest pain and syncope.   Respiratory: Negative for cough and shortness of breath.    Endocrine: Negative for polydipsia, polyphagia and polyuria.   Hematologic/Lymphatic: Negative for bleeding problem. Does not bruise/bleed easily.   Skin: Negative for dry skin, itching and rash.   Musculoskeletal: Negative for falls. Positive for right knee pain and popping.  Gastrointestinal: Negative for abdominal pain and bowel incontinence.   Genitourinary: Negative for bladder  "incontinence and nocturia.   Neurological: Negative for disturbances in coordination, loss of balance and seizures.   Psychiatric/Behavioral: Negative for depression. The patient does not have insomnia.    Allergic/Immunologic: Negative for hives and persistent infections.     PHYSICAL EXAMINATION    Vitals: /60   Pulse 69   Ht 5' 4" (1.626 m)   Wt 132.9 kg (292 lb 15.9 oz)   LMP 03/23/2024 (Exact Date)   BMI 50.29 kg/m²     General: The patient appears active and healthy with no apparent physical problems.  No disturbance of mood or affect is demonstrated. Alert and Oriented.    HEENT: Eyes normal, pupils equally round, nose normal.    Resp: Equal and symmetrical chest rises. No wheezing    CV: Regular rate    Neck: Supple; nonpainful range of motion.    Extremities: no cyanosis, clubbing, edema, or diffuse swelling.  Palpable pulses, good capillary refill of the digits.  No coolness, discoloration, edema or obvious varicosities.    Skin: no lesions noted.    Lymphatic: no detected adenopathy in the upper or lower extremities.    Neurologic: normal mental status, normal reflexes, normal gait and balance.  Patient is alert and oriented to person, place and time.  No flaccidity or spasticity is noted.  No motor or sensory deficits are noted.  Light touch is intact    Orthopaedic: KNEE EXAM - RIGHT    Inspection:   Normal skin color and appearance with no scars, no ecchymosis +mild effusion.      ROM:   0° - 125°.      Palpation:   There is no tenderness along medial plica, medial collateral ligament, pes bursa, lateral joint line, iliotibial band, lateral collateral ligament, popliteal fossa, patellar tendon, or quadriceps tendon.  There is tenderness along the medial joint line.    Stability: - anterior drawer, - Lachman, - pivot shift and - posterior drawer.      No instability with varus or valgus stress at 0° or 30°. Negative dial  test at 30° and 90°.    Tests:   - Anabels test.  - patellar " compression - grind test, + patellofemoral crepitus.  There is no patellar apprehension.     - J Sign. - Pb's. - patellar tilt. - Gavin. Lateral patella translation 1 quadrant. Medial patella translation 1 quadrant    Motor:   Quadriceps strength is 5/5 and hamstrings strength is 5/5. Hamstrings show no tightness.      Neuro:   Distal neurovascular status is normal    Vascular: Negative Homans and no palpable popliteal cords. 2+ pedal pulse with brisk cap refill    Gait Normal    IMAGING      X-ray Knee Ortho Right with Flexion  Narrative: EXAMINATION:  XR KNEE ORTHO RIGHT WITH FLEXION    CLINICAL HISTORY:  Pain in right knee    TECHNIQUE:  AP standing as well as PA flexion standing and Merchant views of both knees were performed.  A lateral view of the right knee is also performed.    COMPARISON:  None.    FINDINGS:  No acute fracture or dislocation seen.  Soft tissues are unremarkable.  Small suprapatellar joint effusion.    Osteophyte formation with narrowing medial tibiofemoral compartment both knees.  Patellar spurring on the right.  Impression: No acute osseous abnormality seen.  Osteoarthritis with narrowing medial tibiofemoral compartment.    Electronically signed by: Latrice Cruz  Date:    04/22/2024  Time:    11:37        Xrays including standing AP, 45 degree PA notch view, lateral and sunrise radiographs of the right knee are ordered / images reviewed by me:  There is normal alignment and normal bone quality.  No fracture or dislocations noted.  Moderate degenerative changes with osteophyte formation seen in the medial and patellofemoral compartments.  Loss of joint space medially.  Worse on the left than the right.      IMPRESSION       ICD-10-CM ICD-9-CM   1. Chronic pain of right knee  M25.561 719.46    G89.29 338.29   2. Primary osteoarthritis of right knee  M17.11 715.16   3. Class 3 severe obesity due to excess calories with serious comorbidity and body mass index (BMI) of 45.0 to 49.9 in adult   E66.01 278.01    Z68.42 V85.42       MEDICATIONS PRESCRIBED      None    RECOMMENDATIONS     Surgical and conservative treatment options for medial and patellofemoral compartment osteoarthritis were discussed in depth today  She is currently in the process of scheduling bariatric surgery, so she can not take oral NSAIDs  Activity modifications were given and physical therapy has been ordered  Encouraged frequent icing with Tylenol 1000 mg by mouth every 8 hours as needed for pain  Discussed diet and exercise for weight loss  We also discussed intra-articular injections including corticosteroids and HA injections; I recommend we limit CSIs if possible due to her history of prediabetes  Return to clinic in 1 month for repeat evaluation      All questions were answered, pt will contact us for questions or concerns in the interim.

## 2024-04-23 ENCOUNTER — TELEPHONE (OUTPATIENT)
Dept: BARIATRICS | Facility: CLINIC | Age: 54
End: 2024-04-23
Payer: COMMERCIAL

## 2024-04-23 ENCOUNTER — PATIENT MESSAGE (OUTPATIENT)
Dept: BARIATRICS | Facility: CLINIC | Age: 54
End: 2024-04-23
Payer: COMMERCIAL

## 2024-04-23 ENCOUNTER — PATIENT MESSAGE (OUTPATIENT)
Dept: PSYCHIATRY | Facility: CLINIC | Age: 54
End: 2024-04-23
Payer: COMMERCIAL

## 2024-04-23 NOTE — TELEPHONE ENCOUNTER
Rec'd incoming call fro pt. Assisted pt w/scheduling Micheal sx workup appts. Time and dates approved per pt. All questions and concerns addressed.

## 2024-05-09 ENCOUNTER — HOSPITAL ENCOUNTER (OUTPATIENT)
Dept: CARDIOLOGY | Facility: HOSPITAL | Age: 54
Discharge: HOME | End: 2024-05-09
Attending: NURSE PRACTITIONER
Payer: COMMERCIAL

## 2024-05-09 VITALS — BODY MASS INDEX: 50.33 KG/M2 | WEIGHT: 293 LBS

## 2024-05-09 DIAGNOSIS — I10 ESSENTIAL HYPERTENSION: ICD-10-CM

## 2024-05-09 DIAGNOSIS — G47.33 OSA (OBSTRUCTIVE SLEEP APNEA): ICD-10-CM

## 2024-05-09 DIAGNOSIS — E66.01 CLASS 3 SEVERE OBESITY DUE TO EXCESS CALORIES WITH SERIOUS COMORBIDITY AND BODY MASS INDEX (BMI) OF 45.0 TO 49.9 IN ADULT: ICD-10-CM

## 2024-05-09 LAB
ASCENDING AORTA: 2.71 CM
AV INDEX (PROSTH): 0.9
AV MEAN GRADIENT: 6 MMHG
AV PEAK GRADIENT: 12 MMHG
AV VALVE AREA BY VELOCITY RATIO: 2.43 CM²
AV VALVE AREA: 2.81 CM²
AV VELOCITY RATIO: 0.77
CV ECHO LV RWT: 0.59 CM
CV STRESS BASE HR: 61 BPM
DIASTOLIC BLOOD PRESSURE: 53 MMHG
DOP CALC AO PEAK VEL: 1.72 M/S
DOP CALC AO VTI: 34.6 CM
DOP CALC LVOT AREA: 3.1 CM2
DOP CALC LVOT DIAMETER: 2 CM
DOP CALC LVOT PEAK VEL: 1.33 M/S
DOP CALC LVOT STROKE VOLUME: 97.34 CM3
DOP CALC MV VTI: 28.9 CM
DOP CALCLVOT PEAK VEL VTI: 31 CM
E WAVE DECELERATION TIME: 214.18 MSEC
E/A RATIO: 1.79
E/E' RATIO: 9.05 M/S
ECHO LV POSTERIOR WALL: 1.24 CM (ref 0.6–1.1)
FRACTIONAL SHORTENING: 32 % (ref 28–44)
INTERVENTRICULAR SEPTUM: 1.16 CM (ref 0.6–1.1)
IVC DIAMETER: 1.63 CM
IVRT: 87.54 MSEC
LA MAJOR: 6.6 CM
LA MINOR: 4.15 CM
LA WIDTH: 4.1 CM
LEFT ATRIUM SIZE: 4.03 CM
LEFT ATRIUM VOLUME: 71.57 CM3
LEFT INTERNAL DIMENSION IN SYSTOLE: 2.85 CM (ref 2.1–4)
LEFT VENTRICLE DIASTOLIC VOLUME: 77.81 ML
LEFT VENTRICLE SYSTOLIC VOLUME: 30.85 ML
LEFT VENTRICULAR INTERNAL DIMENSION IN DIASTOLE: 4.18 CM (ref 3.5–6)
LEFT VENTRICULAR MASS: 176.86 G
LV LATERAL E/E' RATIO: 7.31 M/S
LV SEPTAL E/E' RATIO: 11.88 M/S
LVOT MG: 3.58 MMHG
LVOT MV: 0.87 CM/S
MV MEAN GRADIENT: 1 MMHG
MV PEAK A VEL: 0.53 M/S
MV PEAK E VEL: 0.95 M/S
MV PEAK GRADIENT: 3 MMHG
MV STENOSIS PRESSURE HALF TIME: 62.11 MS
MV VALVE AREA BY CONTINUITY EQUATION: 3.37 CM2
MV VALVE AREA P 1/2 METHOD: 3.54 CM2
OHS CV CPX 85 PERCENT MAX PREDICTED HEART RATE MALE: 143
OHS CV CPX MAX PREDICTED HEART RATE: 168
OHS CV CPX PATIENT IS FEMALE: 1
OHS CV CPX PATIENT IS MALE: 0
OHS CV CPX PEAK DIASTOLIC BLOOD PRESSURE: 70 MMHG
OHS CV CPX PEAK HEAR RATE: 146 BPM
OHS CV CPX PEAK RATE PRESSURE PRODUCT: ABNORMAL
OHS CV CPX PEAK SYSTOLIC BLOOD PRESSURE: 205 MMHG
OHS CV CPX PERCENT MAX PREDICTED HEART RATE ACHIEVED: 91
OHS CV CPX RATE PRESSURE PRODUCT PRESENTING: 8601
OHS CV RV/LV RATIO: 0.89 CM
PISA TR MAX VEL: 1.52 M/S
PV PEAK GRADIENT: 3 MMHG
PV PEAK VELOCITY: 0.81 M/S
RA MAJOR: 5.61 CM
RA PRESSURE ESTIMATED: 8 MMHG
RA WIDTH: 3.95 CM
RIGHT VENTRICULAR END-DIASTOLIC DIMENSION: 3.72 CM
RV TB RVSP: 10 MMHG
SINUS: 2.93 CM
STJ: 1.93 CM
STRESS ST DEPRESSION: 1 MM
SYSTOLIC BLOOD PRESSURE: 141 MMHG
TDI LATERAL: 0.13 M/S
TDI SEPTAL: 0.08 M/S
TDI: 0.11 M/S
TR MAX PG: 9 MMHG
TRICUSPID ANNULAR PLANE SYSTOLIC EXCURSION: 2.64 CM
TV REST PULMONARY ARTERY PRESSURE: 17 MMHG

## 2024-05-09 PROCEDURE — 63600175 PHARM REV CODE 636 W HCPCS: Performed by: INTERNAL MEDICINE

## 2024-05-09 PROCEDURE — 93320 DOPPLER ECHO COMPLETE: CPT

## 2024-05-09 PROCEDURE — 93005 ELECTROCARDIOGRAM TRACING: CPT

## 2024-05-09 PROCEDURE — 93351 STRESS TTE COMPLETE: CPT | Mod: 26,,, | Performed by: INTERNAL MEDICINE

## 2024-05-09 PROCEDURE — 93010 ELECTROCARDIOGRAM REPORT: CPT | Mod: ,,, | Performed by: INTERNAL MEDICINE

## 2024-05-09 RX ORDER — ATROPINE SULFATE 1 MG/ML
5 INJECTION, SOLUTION INTRAMUSCULAR; INTRAVENOUS; SUBCUTANEOUS ONCE
Status: DISCONTINUED | OUTPATIENT
Start: 2024-05-09 | End: 2024-05-09 | Stop reason: CLARIF

## 2024-05-09 RX ORDER — DOBUTAMINE HYDROCHLORIDE 400 MG/100ML
40 INJECTION INTRAVENOUS ONCE
Status: COMPLETED | OUTPATIENT
Start: 2024-05-09 | End: 2024-05-09

## 2024-05-09 RX ADMIN — DOBUTAMINE HYDROCHLORIDE 40 MCG/KG/MIN: 400 INJECTION INTRAVENOUS at 10:05

## 2024-05-09 RX ADMIN — ATROPINE SULFATE 0.5 MG: 1 INJECTION, SOLUTION INTRAMUSCULAR; INTRAVENOUS; SUBCUTANEOUS at 10:05

## 2024-05-10 ENCOUNTER — TELEPHONE (OUTPATIENT)
Dept: CARDIOLOGY | Facility: CLINIC | Age: 54
End: 2024-05-10
Payer: COMMERCIAL

## 2024-05-10 ENCOUNTER — CLINICAL SUPPORT (OUTPATIENT)
Dept: PSYCHIATRY | Facility: CLINIC | Age: 54
End: 2024-05-10
Payer: COMMERCIAL

## 2024-05-10 DIAGNOSIS — E66.01 CLASS 3 SEVERE OBESITY DUE TO EXCESS CALORIES WITH SERIOUS COMORBIDITY AND BODY MASS INDEX (BMI) OF 45.0 TO 49.9 IN ADULT: ICD-10-CM

## 2024-05-10 DIAGNOSIS — Z00.8 ENCOUNTER FOR PRE-SURGICAL PSYCHOLOGICAL ASSESSMENT: Primary | ICD-10-CM

## 2024-05-10 DIAGNOSIS — R94.39 POSITIVE CARDIAC STRESS TEST: Primary | ICD-10-CM

## 2024-05-10 LAB
OHS QRS DURATION: 80 MS
OHS QTC CALCULATION: 428 MS

## 2024-05-13 ENCOUNTER — OFFICE VISIT (OUTPATIENT)
Dept: PSYCHIATRY | Facility: CLINIC | Age: 54
End: 2024-05-13
Payer: COMMERCIAL

## 2024-05-13 DIAGNOSIS — Z01.818 PREOP EXAMINATION: Primary | ICD-10-CM

## 2024-05-13 DIAGNOSIS — E66.01 MORBID OBESITY DUE TO EXCESS CALORIES: ICD-10-CM

## 2024-05-13 DIAGNOSIS — I10 ESSENTIAL HYPERTENSION: ICD-10-CM

## 2024-05-13 DIAGNOSIS — Z86.59 HISTORY OF DEPRESSION: ICD-10-CM

## 2024-05-13 DIAGNOSIS — G47.33 OSA (OBSTRUCTIVE SLEEP APNEA): ICD-10-CM

## 2024-05-13 PROCEDURE — 3044F HG A1C LEVEL LT 7.0%: CPT | Mod: CPTII,95,, | Performed by: PSYCHOLOGIST

## 2024-05-13 PROCEDURE — 96130 PSYCL TST EVAL PHYS/QHP 1ST: CPT | Mod: 95,,, | Performed by: PSYCHOLOGIST

## 2024-05-13 PROCEDURE — 90791 PSYCH DIAGNOSTIC EVALUATION: CPT | Mod: 95,,, | Performed by: PSYCHOLOGIST

## 2024-05-13 PROCEDURE — 96146 PSYCL/NRPSYC TST AUTO RESULT: CPT | Mod: 95,59,, | Performed by: PSYCHOLOGIST
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
## 2024-05-13 NOTE — Clinical Note
There are no overt psychological contraindications for proceeding with bariatric surgery. Overall, Ms. Link is at LOW risk for adverse postsurgical outcomes

## 2024-05-13 NOTE — PROGRESS NOTES
Established Patient - Audio Only Telehealth Visit     The patient location is: Home  The chief complaint leading to consultation is: Psychological Test Administration  Visit type: Virtual visit with audio only (telephone)  Total time spent with patient: 1 Hour       The reason for the audio only service rather than synchronous audio and video virtual visit was related to technical difficulties or patient preference/necessity.     The patient arrived on time for their scheduled testing appointment. Testing codes and duration will be included in the psychologist's progress note.                        This service was not originating from a related E/M service provided within the previous 7 days nor will  to an E/M service or procedure within the next 24 hours or my soonest available appointment.  Prevailing standard of care was able to be met in this audio-only visit.

## 2024-05-14 ENCOUNTER — PATIENT MESSAGE (OUTPATIENT)
Dept: BARIATRICS | Facility: CLINIC | Age: 54
End: 2024-05-14
Payer: COMMERCIAL

## 2024-05-15 ENCOUNTER — OFFICE VISIT (OUTPATIENT)
Dept: CARDIOLOGY | Facility: CLINIC | Age: 54
End: 2024-05-15
Payer: COMMERCIAL

## 2024-05-15 VITALS
DIASTOLIC BLOOD PRESSURE: 78 MMHG | BODY MASS INDEX: 50.02 KG/M2 | WEIGHT: 293 LBS | OXYGEN SATURATION: 96 % | SYSTOLIC BLOOD PRESSURE: 140 MMHG | HEIGHT: 64 IN | HEART RATE: 76 BPM

## 2024-05-15 DIAGNOSIS — G47.33 OSA (OBSTRUCTIVE SLEEP APNEA): ICD-10-CM

## 2024-05-15 DIAGNOSIS — E66.01 CLASS 3 SEVERE OBESITY DUE TO EXCESS CALORIES WITH SERIOUS COMORBIDITY AND BODY MASS INDEX (BMI) OF 45.0 TO 49.9 IN ADULT: ICD-10-CM

## 2024-05-15 DIAGNOSIS — Z01.810 PREOPERATIVE CARDIOVASCULAR EXAMINATION: Primary | ICD-10-CM

## 2024-05-15 DIAGNOSIS — I10 ESSENTIAL HYPERTENSION: ICD-10-CM

## 2024-05-15 DIAGNOSIS — R94.39 POSITIVE CARDIAC STRESS TEST: ICD-10-CM

## 2024-05-15 DIAGNOSIS — R73.03 PREDIABETES: ICD-10-CM

## 2024-05-15 PROCEDURE — 99999 PR PBB SHADOW E&M-EST. PATIENT-LVL V: CPT | Mod: PBBFAC,,, | Performed by: INTERNAL MEDICINE

## 2024-05-15 PROCEDURE — 3078F DIAST BP <80 MM HG: CPT | Mod: CPTII,S$GLB,, | Performed by: INTERNAL MEDICINE

## 2024-05-15 PROCEDURE — 99204 OFFICE O/P NEW MOD 45 MIN: CPT | Mod: S$GLB,,, | Performed by: INTERNAL MEDICINE

## 2024-05-15 PROCEDURE — 1159F MED LIST DOCD IN RCRD: CPT | Mod: CPTII,S$GLB,, | Performed by: INTERNAL MEDICINE

## 2024-05-15 PROCEDURE — 3008F BODY MASS INDEX DOCD: CPT | Mod: CPTII,S$GLB,, | Performed by: INTERNAL MEDICINE

## 2024-05-15 PROCEDURE — 1160F RVW MEDS BY RX/DR IN RCRD: CPT | Mod: CPTII,S$GLB,, | Performed by: INTERNAL MEDICINE

## 2024-05-15 PROCEDURE — 3077F SYST BP >= 140 MM HG: CPT | Mod: CPTII,S$GLB,, | Performed by: INTERNAL MEDICINE

## 2024-05-15 PROCEDURE — 3044F HG A1C LEVEL LT 7.0%: CPT | Mod: CPTII,S$GLB,, | Performed by: INTERNAL MEDICINE

## 2024-05-15 NOTE — PROGRESS NOTES
"Subjective:   Patient ID:  Criss Link is a 54 y.o. female who presents for follow up of Coronary Artery Disease      HPI: Very pleasant woman here prior to gastric sleeve bariatric surgery planned in the early summer.  She had a dobutamine stress echo and the ECG portion was read as "positive", though the images were normal.  (I have reviewed the ECG tracings and find the response to be within normal limits to my eye.)    She denies chest discomfort, worsening WAGNER, palpitations, PND/orthopnea, lightheadedness and syncope.          Patient Active Problem List   Diagnosis    Class 3 severe obesity due to excess calories with serious comorbidity and body mass index (BMI) of 45.0 to 49.9 in adult    Essential hypertension    Prediabetes    Seasonal allergies    Neovascular age-related macular degeneration    Idiopathic polypoidal choroidal vasculopathy    CATE (obstructive sleep apnea)    Vitamin D insufficiency    Decreased range of motion (ROM) of left knee    Weakness of left lower extremity    Generalized anxiety disorder    Recurrent major depressive disorder, in full remission    Hirsutism    Chronic rhinitis    Acute bilateral low back pain without sciatica    Upper back pain    Bilateral carpal tunnel syndrome    Chronic bilateral low back pain without sciatica    Muscle weakness of lower extremity       Current Outpatient Medications   Medication Sig    amLODIPine (NORVASC) 5 MG tablet Take 1 tablet (5 mg total) by mouth once daily.    azelastine (ASTELIN) 137 mcg (0.1 %) nasal spray SPRAY 1 SPRAY (137 MCG TOTAL) BY NASAL ROUTE 2 (TWO) TIMES DAILY.    dorzolamide (TRUSOPT) 2 % ophthalmic solution Place 1 drop into both eyes 2 (two) times daily.    ferrous gluconate (FERGON) 324 MG tablet Take 1 tablet (324 mg total) by mouth daily with breakfast.    meloxicam (MOBIC) 15 MG tablet Take 1 tablet (15 mg total) by mouth once daily.    metFORMIN (GLUCOPHAGE-XR) 500 MG ER 24hr tablet Take 1 tablet (500 mg total) by " mouth 2 (two) times daily with meals.    montelukast (SINGULAIR) 10 mg tablet TAKE 1 TABLET BY MOUTH EVERY DAY IN THE EVENING    sertraline (ZOLOFT) 100 MG tablet Take 1 tablet (100 mg total) by mouth once daily.    spironolactone (ALDACTONE) 50 MG tablet Take 1 tablet (50 mg total) by mouth once daily.    tiZANidine (ZANAFLEX) 4 MG tablet TAKE 1 TABLET (4 MG TOTAL) BY MOUTH NIGHTLY AS NEEDED (BACK PAIN).     No current facility-administered medications for this visit.       ROS  The review of systems is negative except as above.     Objective:   Physical Exam  Vitals reviewed.   Constitutional:       Appearance: She is well-developed.   HENT:      Head: Normocephalic and atraumatic.   Eyes:      General: No scleral icterus.     Conjunctiva/sclera: Conjunctivae normal.   Neck:      Vascular: No JVD.   Cardiovascular:      Rate and Rhythm: Normal rate and regular rhythm.      Pulses: Intact distal pulses.      Heart sounds: Normal heart sounds. No murmur heard.     No friction rub. No gallop.   Pulmonary:      Effort: Pulmonary effort is normal.      Breath sounds: Normal breath sounds. No wheezing or rales.   Abdominal:      General: Bowel sounds are normal. There is no distension.      Palpations: Abdomen is soft.      Tenderness: There is no abdominal tenderness.   Musculoskeletal:         General: Normal range of motion.      Cervical back: Normal range of motion and neck supple.   Skin:     General: Skin is warm and dry.      Findings: No erythema or rash.   Neurological:      Mental Status: She is alert and oriented to person, place, and time.   Psychiatric:         Behavior: Behavior normal.         Thought Content: Thought content normal.         Judgment: Judgment normal.         Lab Results   Component Value Date    WBC 7.73 05/09/2024    HGB 12.4 05/09/2024    HCT 37.8 05/09/2024    MCV 88 05/09/2024     05/09/2024         Chemistry        Component Value Date/Time     05/09/2024 1001    K 4.1  05/09/2024 1001     05/09/2024 1001    CO2 26 05/09/2024 1001    BUN 10 05/09/2024 1001    CREATININE 0.8 05/09/2024 1001    GLU 95 05/09/2024 1001        Component Value Date/Time    CALCIUM 9.6 05/09/2024 1001    ALKPHOS 87 05/09/2024 1001    AST 21 05/09/2024 1001    ALT 28 05/09/2024 1001    BILITOT 0.6 05/09/2024 1001    ESTGFRAFRICA >60 11/12/2021 0945    EGFRNONAA >60 11/12/2021 0945            Lab Results   Component Value Date    CHOL 223 (H) 05/09/2024    CHOL 246 (H) 01/08/2024    CHOL 233 (H) 01/30/2023     Lab Results   Component Value Date    HDL 77 (H) 05/09/2024    HDL 92 (H) 01/08/2024    HDL 98 (H) 01/30/2023     Lab Results   Component Value Date    LDLCALC 131.4 05/09/2024    LDLCALC 138.0 01/08/2024    LDLCALC 115.8 01/30/2023     Lab Results   Component Value Date    TRIG 73 05/09/2024    TRIG 80 01/08/2024    TRIG 96 01/30/2023     Lab Results   Component Value Date    CHOLHDL 34.5 05/09/2024    CHOLHDL 37.4 01/08/2024    CHOLHDL 42.1 01/30/2023       Lab Results   Component Value Date    TSH 0.823 05/09/2024       Lab Results   Component Value Date    HGBA1C 6.0 (H) 05/09/2024       Assessment:     1. Preoperative cardiovascular examination    2. Positive cardiac stress test    3. Class 3 severe obesity due to excess calories with serious comorbidity and body mass index (BMI) of 45.0 to 49.9 in adult    4. Essential hypertension    5. Prediabetes    6. CATE (obstructive sleep apnea)        Plan:     She has no angina, heart failure, or unstable arrhythmia.  No further cardiovascular testing is required prior to proceeding to the operating room.  Her stress ECG response is normal to my eye, and the normal echocardiographic response in particular means that this study is normal.  She does NOT need coronary angiography.    Continue current medicines.    Diet/exercise goals reinforced.    F/U 12 months

## 2024-05-16 ENCOUNTER — CLINICAL SUPPORT (OUTPATIENT)
Dept: BARIATRICS | Facility: CLINIC | Age: 54
End: 2024-05-16
Payer: COMMERCIAL

## 2024-05-16 DIAGNOSIS — G47.33 OSA (OBSTRUCTIVE SLEEP APNEA): ICD-10-CM

## 2024-05-16 DIAGNOSIS — Z71.3 DIETARY COUNSELING AND SURVEILLANCE: ICD-10-CM

## 2024-05-16 DIAGNOSIS — I10 ESSENTIAL HYPERTENSION: Primary | ICD-10-CM

## 2024-05-16 DIAGNOSIS — E66.01 MORBID OBESITY WITH BMI OF 50.0-59.9, ADULT: ICD-10-CM

## 2024-05-16 PROCEDURE — 99499 UNLISTED E&M SERVICE: CPT | Mod: 95,,, | Performed by: DIETITIAN, REGISTERED

## 2024-05-16 PROCEDURE — 97803 MED NUTRITION INDIV SUBSEQ: CPT | Mod: 95,,, | Performed by: DIETITIAN, REGISTERED

## 2024-05-16 NOTE — PROGRESS NOTES
The patient location is: [place] ([place])  The chief complaint leading to consultation is: Medically Supervised Diet pending Gastric Sleeve  Visit type: audiovisual     Face to Face time with patient: 20 min    30 minutes of total time spent on the encounter, which includes face to face time and non-face to face time preparing to see the patient (eg, review of tests), Obtaining and/or reviewing separately obtained history, Documenting clinical information in the electronic or other health record, Independently interpreting results (not separately reported) and communicating results to the patient/family/caregiver, or Care coordination (not separately reported).       Each patient to whom he or she provides medical services by telemedicine is:  (1) informed of the relationship between the physician and patient and the respective role of any other health care provider with respect to management of the patient; and (2) notified that he or she may decline to receive medical services by telemedicine and may withdraw from such care at any time.    NUTRITION NOTE    Referring Physician: Edyta Rosas M.D.   Reason for MNT Referral: Medically Supervised Diet pending Gastric Sleeve    Patient presents for follow-up visit for MSD with weight gain over the past month    CLINICAL DATA:  54 y.o. female.    Initial weight: 288 lbs  Current Weight: 294 lbs (5/15/24)  Weight Change Since Initial Visit: +6 lbs  Ideal Body Weight: 138 lbs  BMI: 50.63    DAILY NUTRITIONAL NEEDS: pre-op nutritional bariatric guidelines to promote weight loss  6750-8067 Calories    Grams Protein    CURRENT DIET:  Regular diet.  Diet Recall: Food records are not present.  Greatest challenge: starchy CHO, fast food, high fat//calorie foods  Progress: Drinking 64 oz fluids/day, drinking protein shake, cut out starchy CHO    Current diet recall:      B: Starbucks spinach and mushroom egg bites and Chatman's coffee with cream and Equal  S: Fresh  fruit - pear, plum  L: Rouses - salad (salad with mushrooms, broccoli, cauliflower, spinach and dark leafy greens, sesame seeds, nuts) with tuna or grilled chicken or chicken salad with grapes and cheese  D: Protein shake      Diet Includes: 2 meals, 1 snack, 1 protein shake  Meal Pattern: Improved.  Protein Supplements: 1 per day. Lean Body  Snacking: Adequate. Snacks include healthy choices.  Vegetables: Likes a variety. Eats daily.   Fruits: Likes a variety. Eats daily. Orange, pears, mangoes, grapes, apples, blackberries, bananas, plums  Beverages: water and coffee without sugar  Dining out: Daily. Mostly take-out. Rouses, Starbucks  Cooking at home: Never. Mostly baked and stovetop meat, fish, and starchy CHO. Pasta/lasagna, salmon    Food Allergies:   None reported     Vitamins / Minerals / Herbs:   Green tea tablet     Labs:   Reviewed     Exercise:  Squats and stretches for back  Plans to go to PT for knee at the end of the month     Restrictions to exercise: knee pain, back pain, shortness of breath     Social:  Works regular daytime shifts. Mon-Fri 8 am-5 pm  Lives with two adult children, mother, father.  Grocery shopping and food prep mother and PT.  Patient believes the household will be supportive after surgery.  Alcohol: Cutting back. Once/week, 2 drinks  Smoking: None.    ASSESSMENT:  Patient demonstrates some willingness to change lifestyle habits as evidenced by dietary changes, including protein drinks, better choices when dining out, and healthier snacking at work.    Doing well with working on greatest challenges (starchy CHO, fast food, high fat/calorie foods).    Barriers to Education:  none  Stage of Change:  action    NUTRITION DIAGNOSIS:    Morbid Obesity related to Excessive carbohydrate intake, Excessive calorie intake, and Physical inactivity as evidence by BMI.  Obesity Status:  Worse    BARIATRIC DIET DISCUSSION/PLAN:  Discussed diet after surgery and related to patient's food  record.  Reviewed nutrition guidelines for before and after surgery.  Answered all questions.  Discussed strength training - weights, resistance bands, pilates  Discussed meal prepping  Discussed pre- and post- op liquid diet  More grocery shopping and meal preparation at home.  Increase exercise.    PT Plan  Practicing sipping fluids  Increase exercise as tolerated  Meal prep    RECOMMENDATIONS:  Pt is potential candidate for surgery.    Diet: Maintain diet plan.  More grocery shopping and meal preparation at home.  Increase exercise.    Exercise: Increase.    Behavior Modification: Begin to document food & activity logs daily.    Return to clinic in one month.    Needs additional visits with RD.    Communicated nutrition plan with bariatric team.    SESSION TIME:  30 minutes

## 2024-05-20 ENCOUNTER — TELEPHONE (OUTPATIENT)
Dept: BARIATRICS | Facility: CLINIC | Age: 54
End: 2024-05-20
Payer: COMMERCIAL

## 2024-05-20 ENCOUNTER — INFUSION (OUTPATIENT)
Dept: INFECTIOUS DISEASES | Facility: HOSPITAL | Age: 54
End: 2024-05-20
Payer: COMMERCIAL

## 2024-05-20 VITALS
SYSTOLIC BLOOD PRESSURE: 135 MMHG | HEART RATE: 58 BPM | WEIGHT: 290.69 LBS | DIASTOLIC BLOOD PRESSURE: 63 MMHG | BODY MASS INDEX: 49.63 KG/M2 | RESPIRATION RATE: 18 BRPM | OXYGEN SATURATION: 97 % | TEMPERATURE: 98 F | HEIGHT: 64 IN

## 2024-05-20 DIAGNOSIS — E51.9 THIAMINE DEFICIENCY: Primary | ICD-10-CM

## 2024-05-20 PROCEDURE — 25000003 PHARM REV CODE 250: Performed by: NURSE PRACTITIONER

## 2024-05-20 PROCEDURE — 96365 THER/PROPH/DIAG IV INF INIT: CPT

## 2024-05-20 PROCEDURE — 96366 THER/PROPH/DIAG IV INF ADDON: CPT

## 2024-05-20 RX ORDER — THIAMINE HYDROCHLORIDE 100 MG/ML
100 INJECTION, SOLUTION INTRAMUSCULAR; INTRAVENOUS DAILY
Status: SHIPPED | OUTPATIENT
Start: 2024-05-20 | End: 2024-05-23

## 2024-05-20 RX ORDER — SODIUM CHLORIDE 0.9 % (FLUSH) 0.9 %
10 SYRINGE (ML) INJECTION
OUTPATIENT
Start: 2024-05-20

## 2024-05-20 RX ORDER — SODIUM CHLORIDE 0.9 % (FLUSH) 0.9 %
10 SYRINGE (ML) INJECTION
Status: DISCONTINUED | OUTPATIENT
Start: 2024-05-20 | End: 2024-05-20 | Stop reason: HOSPADM

## 2024-05-20 RX ORDER — HEPARIN 100 UNIT/ML
500 SYRINGE INTRAVENOUS
OUTPATIENT
Start: 2024-05-20

## 2024-05-20 RX ORDER — SODIUM CHLORIDE 0.9 % (FLUSH) 0.9 %
10 SYRINGE (ML) INJECTION
Status: CANCELLED | OUTPATIENT
Start: 2024-05-20

## 2024-05-20 RX ORDER — HEPARIN 100 UNIT/ML
500 SYRINGE INTRAVENOUS
Status: CANCELLED | OUTPATIENT
Start: 2024-05-20

## 2024-05-20 RX ADMIN — ASCORBIC ACID, VITAMIN A PALMITATE, CHOLECALCIFEROL, THIAMINE HYDROCHLORIDE, RIBOFLAVIN-5 PHOSPHATE SODIUM, PYRIDOXINE HYDROCHLORIDE, NIACINAMIDE, DEXPANTHENOL, ALPHA-TOCOPHEROL ACETATE, VITAMIN K1, FOLIC ACID, BIOTIN, CYANOCOBALAMIN: 200; 3300; 200; 6; 3.6; 6; 40; 15; 10; 150; 600; 60; 5 INJECTION, SOLUTION INTRAVENOUS at 11:05

## 2024-05-20 NOTE — TELEPHONE ENCOUNTER
Rec'd call from pt stating she is done w/infusion and [facility] main pharmacy doesn't have an order for B injection. DESTINI Neal NP notified via TEAMS.  DESTINI Neal NP stated the order was placed and is unsure what transpired. She will investigate in the morning. Pt notified. Understanding stated. All questions and concerns addressed.

## 2024-05-20 NOTE — TELEPHONE ENCOUNTER
Rec'd call from ABDULAZIZ Mcpherson r/t pt's low Thiamine level and the order was placed. Laura stated she was unable to reach Ms. Link via phone and expressed needing assistance in reaching her.  Laura stated d/t pt's low Thiamine level it is urgent that the pt be reached.   Called pt and informed her of the above said information. Understanding stated. Pt given Vida's number for further explanation.   [facility] main infusion called, 1030am appt time obtained. Pt notified. All questions and concerns addressed. ABDULAZIZ Mcpherson notified.

## 2024-05-20 NOTE — PROGRESS NOTES
Pt arrived to infusion suite for sodium chloride 0.9% 1,000 mL with mvi, adult no.1, vit K, 2 of 2 600 mcg-60 mcg- 5 mcg/5 mL     Infusion given at 200ml/hr over 5 hours. Pt tolerating well.    Limited head-to-toe assessment due to privacy issues and visit reason though the opportunity was given for patient to express any concerns

## 2024-05-21 ENCOUNTER — PATIENT MESSAGE (OUTPATIENT)
Dept: BARIATRICS | Facility: CLINIC | Age: 54
End: 2024-05-21
Payer: COMMERCIAL

## 2024-05-21 RX ORDER — THIAMINE HYDROCHLORIDE 100 MG/ML
100 INJECTION, SOLUTION INTRAMUSCULAR; INTRAVENOUS DAILY
Qty: 4 ML | Refills: 0 | Status: SHIPPED | OUTPATIENT
Start: 2024-05-21 | End: 2024-05-25

## 2024-05-30 ENCOUNTER — PATIENT MESSAGE (OUTPATIENT)
Dept: ADMINISTRATIVE | Facility: HOSPITAL | Age: 54
End: 2024-05-30
Payer: COMMERCIAL

## 2024-06-11 ENCOUNTER — PATIENT MESSAGE (OUTPATIENT)
Dept: BARIATRICS | Facility: CLINIC | Age: 54
End: 2024-06-11
Payer: COMMERCIAL

## 2024-06-17 ENCOUNTER — TELEPHONE (OUTPATIENT)
Dept: BARIATRICS | Facility: CLINIC | Age: 54
End: 2024-06-17
Payer: COMMERCIAL

## 2024-06-17 DIAGNOSIS — E51.9 THIAMINE DEFICIENCY: Primary | ICD-10-CM

## 2024-06-18 ENCOUNTER — CLINICAL SUPPORT (OUTPATIENT)
Dept: ENDOSCOPY | Facility: HOSPITAL | Age: 54
End: 2024-06-18
Attending: OBSTETRICS & GYNECOLOGY
Payer: COMMERCIAL

## 2024-06-18 ENCOUNTER — TELEPHONE (OUTPATIENT)
Dept: ENDOSCOPY | Facility: HOSPITAL | Age: 54
End: 2024-06-18

## 2024-06-18 DIAGNOSIS — Z12.11 COLON CANCER SCREENING: ICD-10-CM

## 2024-06-18 NOTE — TELEPHONE ENCOUNTER
Attempted to contact patient to schedule colonoscopy. The patient did not answer the call.  Left voice message  requesting a call back at [phone] to get procedure scheduled.

## 2024-06-19 ENCOUNTER — TELEPHONE (OUTPATIENT)
Dept: ENDOSCOPY | Facility: HOSPITAL | Age: 54
End: 2024-06-19
Payer: COMMERCIAL

## 2024-06-19 VITALS — HEIGHT: 64 IN | WEIGHT: 289.88 LBS | BODY MASS INDEX: 49.49 KG/M2

## 2024-06-19 NOTE — PROGRESS NOTES
NUTRITION NOTE    Referring Physician: Edyta Rosas M.D.   Reason for MNT Referral: 3 months Medically Supervised Diet pending Gastric Sleeve    Patient presents for follow-up visit for MSD with weight loss over the past month; 2 lbs total weight loss     PT complains that protein shakes cause diarrhea.    CLINICAL DATA:  54 y.o. female.    Initial weight: 288 lbs  Current Weight: 268 lbs  Weight Change Since Initial Visit: -2 lbs  Ideal Body Weight: 138 lbs  BMI: 49.13    DAILY NUTRITIONAL NEEDS: pre-op nutritional bariatric guidelines to promote weight loss  0965-6973 Calories    Grams Protein    CURRENT DIET:  Regular diet.  Diet Recall: Food records are not present.  Greatest challenge: starchy CHO, fast food, high fat/calorie foods  Progress: Making better choices when eating out, trying different protein shakes, bars, and chips     Current diet recall:      B: Boiled egg, vanilla Greek yogurt with blackberries, sometimes with a Chatman's coffee with cream and Equal  S: Fresh fruit - blackberries, oranges, strawberries  L: Poke [place] - spring mix, tofu, shrimp, edamame, crab meat, cilantro, sesame seed oil  S: Protein bar/chips  D: Rest of poke bowl  S: Protein shake     Diet Includes: 2 meals, 1 snack, 1 protein shake  Meal Pattern: Improved.  Protein Supplements: 1 per day. Premier Protein, Lean Body, One protein bar, Quest chips  Snacking: Adequate. Snacks include healthy choices.  Vegetables: Likes a variety. Eats daily.   Fruits: Likes a variety. Eats daily. Orange, pears, mangoes, grapes, apples, blackberries, bananas, plums  Beverages: water and coffee without sugar  Dining out: Daily. Mostly take-out. Poke bowls  Cooking at home: Never.If cooks, baked fish or chicken     Food Allergies:   None reported     Vitamins / Minerals / Herbs:   Green tea tablet  B-1 250 mg weekly     Labs:   Reviewed  Low B-1     Exercise:  Squats and stretches for back     Restrictions to exercise: knee pain,  back pain, shortness of breath     Social:  Works regular daytime shifts. Mon-Fri 8 am-5 pm  Lives with two adult children, mother, father.  Grocery shopping and food prep mother and PT.  Patient believes the household will be supportive after surgery.  Alcohol: Cutting back. Once/week, 2 drinks  Smoking: None.    ASSESSMENT:  Patient demonstrates some willingness/progress to change lifestyle habits as evidenced by weight loss, dietary changes, including protein drinks, better choices when dining out, and healthier snacking .    Doing well with working on greatest challenges (starchy CHO, fast food, high fat/calorie foods).    Barriers to Education:  none  Stage of Change:  action    NUTRITION DIAGNOSIS:  Morbid Obesity related to Excessive carbohydrate intake, Excessive calorie intake, and Physical inactivity as evidence by BMI.  Obesity Status: Same    BARIATRIC DIET DISCUSSION/PLAN:  Discussed diet after surgery and related to patient's food record.  Reviewed nutrition guidelines for before and after surgery.  Answered all questions.  Discussed switching to light Greek yogurt  Suggested PT try Fairlife Core Power or take a lactase pill before drinking protein shake  Continue to review Bariatric Nutrition Guidebook at home and call with any questions.    RECOMMENDATIONS:  Pt is potential candidate for surgery.    Diet: Maintain diet plan.  Continue to review Bariatric Nutrition Guidebook at home and call with any questions.    Exercise: Increase.    Behavior Modification: Begin to document food & activity logs daily.    Return to clinic in one month.    Needs additional visits with RD.    Communicated nutrition plan with bariatric team.    SESSION TIME:  30 minutes

## 2024-06-19 NOTE — TELEPHONE ENCOUNTER
Called pt. To schedule Colonoscopy. Pt. Wants to wait to schedule at this time. She may be having a Surgery for Weight loss this summer and will call our Dept. Back when she has more information.Scheduling phone number provided and put in my [facility].

## 2024-06-20 ENCOUNTER — OFFICE VISIT (OUTPATIENT)
Dept: BARIATRICS | Facility: CLINIC | Age: 54
End: 2024-06-20
Payer: COMMERCIAL

## 2024-06-20 ENCOUNTER — LAB VISIT (OUTPATIENT)
Dept: LAB | Facility: HOSPITAL | Age: 54
End: 2024-06-20
Payer: COMMERCIAL

## 2024-06-20 ENCOUNTER — CLINICAL SUPPORT (OUTPATIENT)
Dept: BARIATRICS | Facility: CLINIC | Age: 54
End: 2024-06-20
Payer: COMMERCIAL

## 2024-06-20 VITALS
OXYGEN SATURATION: 98 % | SYSTOLIC BLOOD PRESSURE: 126 MMHG | DIASTOLIC BLOOD PRESSURE: 80 MMHG | WEIGHT: 286.19 LBS | HEART RATE: 70 BPM | BODY MASS INDEX: 49.13 KG/M2

## 2024-06-20 DIAGNOSIS — I10 ESSENTIAL HYPERTENSION: Primary | ICD-10-CM

## 2024-06-20 DIAGNOSIS — E51.9 THIAMINE DEFICIENCY: ICD-10-CM

## 2024-06-20 DIAGNOSIS — I10 ESSENTIAL HYPERTENSION: ICD-10-CM

## 2024-06-20 DIAGNOSIS — G47.33 OSA (OBSTRUCTIVE SLEEP APNEA): ICD-10-CM

## 2024-06-20 DIAGNOSIS — E66.01 CLASS 3 SEVERE OBESITY DUE TO EXCESS CALORIES WITH SERIOUS COMORBIDITY AND BODY MASS INDEX (BMI) OF 45.0 TO 49.9 IN ADULT: Primary | ICD-10-CM

## 2024-06-20 DIAGNOSIS — Z71.3 ENCOUNTER FOR WEIGHT LOSS COUNSELING: ICD-10-CM

## 2024-06-20 DIAGNOSIS — R73.03 PREDIABETES: ICD-10-CM

## 2024-06-20 DIAGNOSIS — Z71.3 DIETARY COUNSELING AND SURVEILLANCE: ICD-10-CM

## 2024-06-20 DIAGNOSIS — E66.01 MORBID OBESITY WITH BMI OF 40.0-44.9, ADULT: ICD-10-CM

## 2024-06-20 PROCEDURE — 99999 PR PBB SHADOW E&M-EST. PATIENT-LVL II: CPT | Mod: PBBFAC,,, | Performed by: DIETITIAN, REGISTERED

## 2024-06-20 PROCEDURE — 3008F BODY MASS INDEX DOCD: CPT | Mod: CPTII,S$GLB,, | Performed by: STUDENT IN AN ORGANIZED HEALTH CARE EDUCATION/TRAINING PROGRAM

## 2024-06-20 PROCEDURE — 1160F RVW MEDS BY RX/DR IN RCRD: CPT | Mod: CPTII,S$GLB,, | Performed by: STUDENT IN AN ORGANIZED HEALTH CARE EDUCATION/TRAINING PROGRAM

## 2024-06-20 PROCEDURE — 3074F SYST BP LT 130 MM HG: CPT | Mod: CPTII,S$GLB,, | Performed by: STUDENT IN AN ORGANIZED HEALTH CARE EDUCATION/TRAINING PROGRAM

## 2024-06-20 PROCEDURE — 99999 PR PBB SHADOW E&M-EST. PATIENT-LVL III: CPT | Mod: PBBFAC,,, | Performed by: STUDENT IN AN ORGANIZED HEALTH CARE EDUCATION/TRAINING PROGRAM

## 2024-06-20 PROCEDURE — 1159F MED LIST DOCD IN RCRD: CPT | Mod: CPTII,S$GLB,, | Performed by: STUDENT IN AN ORGANIZED HEALTH CARE EDUCATION/TRAINING PROGRAM

## 2024-06-20 PROCEDURE — 3044F HG A1C LEVEL LT 7.0%: CPT | Mod: CPTII,S$GLB,, | Performed by: STUDENT IN AN ORGANIZED HEALTH CARE EDUCATION/TRAINING PROGRAM

## 2024-06-20 PROCEDURE — 99213 OFFICE O/P EST LOW 20 MIN: CPT | Mod: S$GLB,,, | Performed by: STUDENT IN AN ORGANIZED HEALTH CARE EDUCATION/TRAINING PROGRAM

## 2024-06-20 PROCEDURE — 84425 ASSAY OF VITAMIN B-1: CPT | Performed by: NURSE PRACTITIONER

## 2024-06-20 PROCEDURE — 36415 COLL VENOUS BLD VENIPUNCTURE: CPT | Performed by: NURSE PRACTITIONER

## 2024-06-20 PROCEDURE — 3079F DIAST BP 80-89 MM HG: CPT | Mod: CPTII,S$GLB,, | Performed by: STUDENT IN AN ORGANIZED HEALTH CARE EDUCATION/TRAINING PROGRAM

## 2024-06-20 RX ORDER — METFORMIN HYDROCHLORIDE 500 MG/1
500 TABLET, EXTENDED RELEASE ORAL 2 TIMES DAILY WITH MEALS
Qty: 180 TABLET | Refills: 0 | Status: SHIPPED | OUTPATIENT
Start: 2024-06-20 | End: 2024-09-18

## 2024-06-20 NOTE — PROGRESS NOTES
Subjective:       Patient ID: Criss Link is a 54 y.o. female.    Chief Complaint: Follow-up, Obesity, and Weight Check    Patient presents for treatment of obesity.     Co-morbidities:   HTN  Prediabetes  CATE  Anxiety and Depression    Negative for thyroid cancer  Negative for pancreatitis    Weight History  Lowest adult weight: around 175 lbs  Highest adult weight: 271 lbs (current weight)     History of Weight Loss Efforts  No prior use of prescription medication for weight loss    Current Physical Activity  No current exercise routine  Sedentary job    Current Eating Habits  Poke Bowls  Protein Shakes and bars  Starbucks kale and mushroom egg bites  Boiled eggs  Greek yogurt    See dietician notes for additional details    Last seen 4/2022. Was up to about 300 lbs. Was prescribed Ozempic when previously seen      Medical Weight Loss  2/24/2022: 271.7 lbs, BMI 45.2, BFP 49.8%, .2 lbs, SMM 76.9 lbs, BMR 1707 kcal; Ozempic  4/7/2022: 267.4 lbs, BMI 44.5, BFP 49.3%, .8 lbs, SMM 77.2 lbs, BMR 1698 kcal; Ozempic  3/13/2024: 293 lbs, BMI 48.8, BFP 51.8%,  lbs, SMM 80.9 lbs, BMR 1757 kcal  6/20/2024: 286.2 lbs, BMI 49.1, BFP 52.4%,  lbs, SMM 76.7 lbs, BMR 1704 kcal      Review of Systems   Constitutional:  Negative for chills and fever.   Respiratory:  Negative for shortness of breath.    Cardiovascular:  Negative for chest pain and palpitations.   Gastrointestinal:  Negative for abdominal pain, nausea and vomiting.   Musculoskeletal:  Positive for arthralgias.   Neurological:  Negative for dizziness and light-headedness.   Psychiatric/Behavioral:  The patient is not nervous/anxious.          Objective:        Latest Reference Range & Units 05/09/24 10:01   WBC 3.90 - 12.70 K/uL 7.73   RBC 4.00 - 5.40 M/uL 4.29   Hemoglobin 12.0 - 16.0 g/dL 12.4   Hematocrit 37.0 - 48.5 % 37.8   MCV 82 - 98 fL 88   MCH 27.0 - 31.0 pg 28.9   MCHC 32.0 - 36.0 g/dL 32.8   RDW 11.5 - 14.5 % 13.1   Platelet  Count 150 - 450 K/uL 369   MPV 9.2 - 12.9 fL 9.7   Gran % 38.0 - 73.0 % 77.9 (H)   Lymph % 18.0 - 48.0 % 15.3 (L)   Mono % 4.0 - 15.0 % 5.4   Eos % 0.0 - 8.0 % 0.8   Basophil % 0.0 - 1.9 % 0.3   Immature Granulocytes 0.0 - 0.5 % 0.3   Gran # (ANC) 1.8 - 7.7 K/uL 6.0   Lymph # 1.0 - 4.8 K/uL 1.2   Mono # 0.3 - 1.0 K/uL 0.4   Eos # 0.0 - 0.5 K/uL 0.1   Baso # 0.00 - 0.20 K/uL 0.02   Immature Grans (Abs) 0.00 - 0.04 K/uL 0.02   nRBC 0 /100 WBC 0   Differential Method  Automated   Iron 30 - 160 ug/dL 100   TIBC 250 - 450 ug/dL 334   Saturated Iron 20 - 50 % 30   Transferrin 200 - 375 mg/dL 226   Folate 4.0 - 24.0 ng/mL 7.3   Vitamin B12 210 - 950 pg/mL 658   Sodium 136 - 145 mmol/L 139   Potassium 3.5 - 5.1 mmol/L 4.1   Chloride 95 - 110 mmol/L 104   CO2 23 - 29 mmol/L 26   Anion Gap 8 - 16 mmol/L 9   BUN 6 - 20 mg/dL 10   Creatinine 0.5 - 1.4 mg/dL 0.8   eGFR >60 mL/min/1.73 m^2 >60   Glucose 70 - 110 mg/dL 95   Calcium 8.7 - 10.5 mg/dL 9.6   Phosphorus Level 2.7 - 4.5 mg/dL 3.7   Magnesium  1.6 - 2.6 mg/dL 1.8   ALP 55 - 135 U/L 87   PROTEIN TOTAL 6.0 - 8.4 g/dL 7.3   Albumin 3.5 - 5.2 g/dL 3.8   BILIRUBIN TOTAL 0.1 - 1.0 mg/dL 0.6   Bilirubin Direct 0.1 - 0.3 mg/dL 0.2   AST 10 - 40 U/L 21   ALT 10 - 44 U/L 28   Cholesterol Total 120 - 199 mg/dL 223 (H)   HDL 40 - 75 mg/dL 77 (H)   HDL/Cholesterol Ratio 20.0 - 50.0 % 34.5   Non-HDL Cholesterol mg/dL 146   Total Cholesterol/HDL Ratio 2.0 - 5.0  2.9   Triglycerides 30 - 150 mg/dL 73   LDL Cholesterol 63.0 - 159.0 mg/dL 131.4   Thiamine 38 - 122 ug/L 11 (L)   Vitamin D 30 - 96 ng/mL 39   Hemoglobin A1C External 4.0 - 5.6 % 6.0 (H)   Estimated Avg Glucose 68 - 131 mg/dL 126   TSH 0.400 - 4.000 uIU/mL 0.823   T3, Total 60 - 180 ng/dL 93   T4 Total 4.5 - 11.5 ug/dL 6.9   Free T4 0.71 - 1.51 ng/dL 0.84   (H): Data is abnormally high  (L): Data is abnormally low      Vitals:    06/20/24 1526   BP: 126/80   Pulse: 70       Physical Exam  Vitals reviewed.   Constitutional:        General: She is not in acute distress.     Appearance: Normal appearance. She is obese. She is not ill-appearing, toxic-appearing or diaphoretic.   HENT:      Head: Normocephalic and atraumatic.   Cardiovascular:      Rate and Rhythm: Normal rate.   Pulmonary:      Effort: Pulmonary effort is normal. No respiratory distress.   Skin:     General: Skin is warm and dry.   Neurological:      Mental Status: She is alert and oriented to person, place, and time.         Assessment:       Problem List Items Addressed This Visit       Class 3 severe obesity due to excess calories with serious comorbidity and body mass index (BMI) of 45.0 to 49.9 in adult - Primary    Essential hypertension    Prediabetes    CATE (obstructive sleep apnea)     Other Visit Diagnoses       Encounter for weight loss counseling                    Plan:   - Metformin  mg twice daily    - Recommendations per bariatric dietician    - Moderate intensity aerobic exercise for 15-30 minutes 3-5x/week    - Continue work-up for bariatric surgery

## 2024-06-26 LAB — VIT B1 BLD-MCNC: 63 UG/L (ref 38–122)

## 2024-07-03 ENCOUNTER — PATIENT MESSAGE (OUTPATIENT)
Dept: BARIATRICS | Facility: CLINIC | Age: 54
End: 2024-07-03
Payer: COMMERCIAL

## 2024-07-09 ENCOUNTER — PATIENT MESSAGE (OUTPATIENT)
Dept: BARIATRICS | Facility: CLINIC | Age: 54
End: 2024-07-09
Payer: COMMERCIAL

## 2024-07-17 ENCOUNTER — CLINICAL SUPPORT (OUTPATIENT)
Dept: BARIATRICS | Facility: CLINIC | Age: 54
End: 2024-07-17
Payer: COMMERCIAL

## 2024-07-17 VITALS — WEIGHT: 283.31 LBS | BODY MASS INDEX: 48.63 KG/M2

## 2024-07-17 DIAGNOSIS — E66.01 CLASS 3 SEVERE OBESITY DUE TO EXCESS CALORIES WITH SERIOUS COMORBIDITY AND BODY MASS INDEX (BMI) OF 45.0 TO 49.9 IN ADULT: ICD-10-CM

## 2024-07-17 DIAGNOSIS — I10 ESSENTIAL HYPERTENSION: ICD-10-CM

## 2024-07-17 DIAGNOSIS — Z71.3 DIETARY COUNSELING AND SURVEILLANCE: Primary | ICD-10-CM

## 2024-07-17 DIAGNOSIS — G47.33 OSA (OBSTRUCTIVE SLEEP APNEA): ICD-10-CM

## 2024-07-17 PROCEDURE — 99999 PR PBB SHADOW E&M-EST. PATIENT-LVL I: CPT | Mod: PBBFAC,,, | Performed by: DIETITIAN, REGISTERED

## 2024-07-17 PROCEDURE — 97803 MED NUTRITION INDIV SUBSEQ: CPT | Mod: S$GLB,,, | Performed by: DIETITIAN, REGISTERED

## 2024-07-17 NOTE — PROGRESS NOTES
NUTRITION NOTE    Referring Physician: Edyta Rosas M.D.   Reason for MNT Referral: 3 months Medically Supervised Diet pending Gastric Sleeve    Patient presents for follow-up visit for MSD with weight loss over the past month; 5 lbs total weight loss     CLINICAL DATA:  54 y.o. female.    Initial weight: 288 lbs  Current Weight: 283 lbs  Weight Change Since Initial Visit: -5 lbs  Ideal Body Weight: 138 lbs  BMI: 48.63    DAILY NUTRITIONAL NEEDS: pre-op nutritional bariatric guidelines to promote weight loss  2161-1373 Calories    Grams Protein    CURRENT DIET:  Regular diet.  Diet Recall: Food records are not present.    Greatest challenge: starchy CHO, fast food, high fat/calorie foods  Progress:  Tried Soylent (OK).      Current diet recall:      B: Boiled egg with sliced avocado, coffee with cream and Splenda vanilla   S: [place] cherries  L: Poke Sanjeev - spring mix, seaweed salad, tofu, shrimp, edamame, crab meat, cilantro, sesame seed oil  S: Protein bar  D: Protein shake     Diet Includes: 2 meals, 1 snack, 1 protein shake  Meal Pattern: Improved.  Protein Supplements: 1 per day. Premier Protein, Lean Body, One protein bar, Quest chips  Snacking: Adequate. Snacks include healthy choices.  Vegetables: Likes a variety. Eats daily.   Fruits: Likes a variety. Eats daily. Orange, pears, mangoes, grapes, apples, blackberries, bananas, plums  Beverages: water and coffee without sugar  Dining out: Daily. Mostly take-out. Poke bowls  Cooking at home: Never. If cooks, baked fish or chicken     Food Allergies:   None reported  Suspect lactose intolerance. Premier Protein caused diarrhea. No problems with Fairlife or Soylent     Vitamins / Minerals / Herbs:   Green tea tablet  B-1 250 mg weekly     Labs:   Reviewed     Physical Activity:  Squats and stretches for back  Taking stairs more  Resistance bands while sitting at work     Restrictions to exercise: knee pain, back pain, shortness of breath      Social:  Works regular daytime shifts. Mon-Fri 8 am-5 pm  Lives with two adult children, mother, father.  Grocery shopping and food prep mother and PT.  Patient believes the household will be supportive after surgery.  Alcohol: Cutting back. Once/week, 2 drinks  Smoking: None.    ASSESSMENT:  Patient demonstrates some willingness/progress to change lifestyle habits as evidenced by weight loss, dietary changes, and including protein drinks.    Doing well with working on greatest challenges ( starchy CHO, fast food, high fat/calorie foods ).    Barriers to Education:  none  Stage of Change:  action    NUTRITION DIAGNOSIS:  Morbid Obesity related to Excessive carbohydrate intake, Excessive calorie intake, and Physical inactivity as evidence by BMI.   Obesity Status: Same    BARIATRIC DIET DISCUSSION/PLAN:  Discussed diet after surgery and related to patient's food record.  Reviewed nutrition guidelines for before and after surgery.  Answered all questions.  Reviewed pre- and post-op liquid diet, vitamins, minerals - handout provided  Start shopping for bariatric vitamins & minerals.  Bring vitamins and minerals to pre-op visit for review    RECOMMENDATIONS:  Pt is good candidate for surgery.    Diet: Maintain diet plan.  Start shopping for bariatric vitamins & minerals.    Exercise: Increase.    Behavior Modification: Begin to document food & activity logs daily.    Communicated nutrition plan with bariatric team.    SESSION TIME:  30 minutes

## 2024-07-19 ENCOUNTER — TELEPHONE (OUTPATIENT)
Dept: BARIATRICS | Facility: CLINIC | Age: 54
End: 2024-07-19
Payer: COMMERCIAL

## 2024-07-19 DIAGNOSIS — R73.03 PREDIABETES: ICD-10-CM

## 2024-07-19 DIAGNOSIS — G47.33 OSA (OBSTRUCTIVE SLEEP APNEA): ICD-10-CM

## 2024-07-19 DIAGNOSIS — E66.01 CLASS 3 OBESITY: Primary | ICD-10-CM

## 2024-07-19 DIAGNOSIS — I10 ESSENTIAL HYPERTENSION: ICD-10-CM

## 2024-07-19 NOTE — TELEPHONE ENCOUNTER
"Spoke with patient and confirmed the surgical procedure of laparoscopic sleeve gastrectomy with Dr Rosas on 8-26-24.  Scheduled preop appts/surgery date/2 week and 8 week post op appts. All dates and times agreed upon. Pt aware that if they are required to have PCP clearance, it must be within 6 months of surgery, unless their medical history has changed, it should be dated, signed and in chart for preop appointment. All medications have been reviewed regarding the necessity to be crushed or broken into pieces smaller that the tip of a pencil eraser for 2 weeks following gastric sleeve surgery and 4 weeks following gastric bypass surgery. Pt instructed to stop taking all NSAIDS 1 week before surgery and for life after surgery. Pt aware that protein liquid diet start date is 8-12-24. Patient is doing well with their diet. Patient was instructed about the progression of the diet phases. The patient's current weight is 283 lbs, height is 5'4", and BMI is 48.63. Refer to medical letter of necessity from Surgeon. Discussed the importance of increased physical activity and dieting lifestyle changes to improve weight loss and meet goals. Screened patient for history of UTI per protocol. Discussed with patient to avoid antibiotics and elective procedures involving sedation/anesthesia within 30 days of surgery unless cleared by the bariatric department. Patient instructed to call the bariatric clinic post op for any s/s of UTI. Patient's mailing address confirmed and informed to expect a manilla envelop containing bariatric surgery pre op booklet, appointment reminders, protein and fluid log sheets, and liquid diet and vitamin information sheets. Pt aware that all appts can be seen in my [facility] patient portal at this time. Confirmed email address and informed patient that they will be enrolled in the Patient Reported Outcomes program to track their progress and successes. The first email will be sent 2-3 weeks before " surgery and then every year on your surgery anniversary date. Office phone and fax number given to patient for any future questions/concerns. Discussed the pre-surgery complex carbohydrate beverage to purchase in [facility] pharmacy to drink 30 minutes before the surgery arrival time. Reviewed the policy of scheduling a covid test 72 hours prior to surgery if necessary.

## 2024-07-19 NOTE — LETTER
"  [facility] - Bariatric Surg 2nd Fl  [address]  [facility] [address]  Phone: [phone]  Fax: [phone] 2024       Attn: Pre-determination Dept.    RE:  Criss Link          OC #: [id number]          : 1970    To Whom It May Concern:  I am sending this letter on behalf of Criss Link (a 54 y.o. female) for pre-approval for Bariatric Surgery; specifically the laparoscopic sleeve gastrectomy. Criss  has a past medical history of class 3 obesity, BMI 45-49.9, Hypertension, CATE on CPAP, Idiopathic polypoidal choroidal vasculopathy, and Prediabetes. Criss has made numerous attempts at dieting and exercise programs, and has failed to maintain any sustained weight loss.  Weight/Height/BMI  Estimated body mass index is 48.63 kg/m² as calculated from the following:  Height as of 24: 5' 4" (1.626 m).  Weight as of 24: 128.5 kg (283 lb 4.7 oz).  Criss Link has been evaluated in our bariatric program by myself, the , and the program dietician and is felt to be an excellent candidate for surgery.  In addition, she has undergone a psychological evaluation, from which a letter is enclosed.  Criss Link has undergone extensive pre-operative education and understands all the risks, benefits, and possible complications of surgery.  She has also undergone dietary education and thorough nutritional evaluation via a registered dietician.  Our program provides long term nutritional counseling with unlimited consults with the dietician.    Our team is sending this letter to receive pre-approval for the indicated procedure.  Please let us know if you have any questions or require any further information.  Sincerely,    Edyta Otero MD  General, Laparoscopic, and Bariatric Surgery  [facility] [place], [place]         "

## 2024-07-23 ENCOUNTER — TELEPHONE (OUTPATIENT)
Dept: BARIATRICS | Facility: CLINIC | Age: 54
End: 2024-07-23
Payer: COMMERCIAL

## 2024-07-23 DIAGNOSIS — E66.01 MORBID OBESITY: Primary | ICD-10-CM

## 2024-07-23 DIAGNOSIS — I10 ESSENTIAL HYPERTENSION: ICD-10-CM

## 2024-07-23 DIAGNOSIS — G47.33 OSA ON CPAP: ICD-10-CM

## 2024-08-08 ENCOUNTER — TELEPHONE (OUTPATIENT)
Dept: BARIATRICS | Facility: CLINIC | Age: 54
End: 2024-08-08
Payer: COMMERCIAL

## 2024-08-12 ENCOUNTER — PATIENT MESSAGE (OUTPATIENT)
Dept: BARIATRICS | Facility: CLINIC | Age: 54
End: 2024-08-12
Payer: COMMERCIAL

## 2024-08-12 ENCOUNTER — PATIENT MESSAGE (OUTPATIENT)
Dept: BARIATRICS | Facility: CLINIC | Age: 54
End: 2024-08-12

## 2024-08-12 ENCOUNTER — CLINICAL SUPPORT (OUTPATIENT)
Dept: BARIATRICS | Facility: CLINIC | Age: 54
End: 2024-08-12
Payer: COMMERCIAL

## 2024-08-12 DIAGNOSIS — Z71.3 DIETARY COUNSELING AND SURVEILLANCE: Primary | ICD-10-CM

## 2024-08-12 PROCEDURE — 99499 UNLISTED E&M SERVICE: CPT | Mod: 95,,, | Performed by: DIETITIAN, REGISTERED

## 2024-08-12 NOTE — PROGRESS NOTES
Established Patient - Audio Only Telehealth Visit     The patient location is: home ([place])  The chief complaint leading to consultation is: pre op  Visit type: Virtual visit with audio only (telephone)  Total time spent with patient: 15 min       The reason for the audio only service rather than synchronous audio and video virtual visit was related to technical difficulties or patient preference/necessity.     Each patient to whom I provide medical services by telemedicine is:  (1) informed of the relationship between the physician and patient and the respective role of any other health care provider with respect to management of the patient; and (2) notified that they may decline to receive medical services by telemedicine and may withdraw from such care at any time. Patient verbally consented to receive this service via voice-only telephone call.       NUTRITION NOTE    Bariatric Surgeon: Edyta Rosas M.D.  Reason for MNT Referral: Pre-op liquid diet and nutrition instructions  Sleeve   Date of Surgery 8/26/24    Pre-op liquid diet  Pt using Premier shakes 4/day for preop liquid diet    Discussion:  -  gms of protein per day  - 600-800 calories per day   - Less than 4gm sugar per shake  - SF, Decaf, non-carbonated Fluids  - No Fruits, juices, yogurt or pudding on liquid diet  - No vitamins for 1 week prior to surgery  - No herbal supplements including green tea and fish oils for 2 weeks prior to surgery    Remind pt per nursing and medical team to inform our department if taking antibiotics within the 30 days post bariatric surgery or it any other surgeries/procedures are scheduled within 30 days after bariatric surgery.    2 week post-op instructions  Reviewed nutritional guidelines for protein and fluid requirements for week 1 and week 2 post-surgery.  Handout provided to log protein and fluid daily.  1 ounce medicine cups provided for patient to measure fluid intake after surgery.    Pt has the  following vitamins and minerals to start taking 1 week after surgery  Multivitamin with 18 mg iron take one tablet or chewable twice a day  B-complex with 50 mg thiamin taken once daily  Calcium citrate 500 mg with vitamin D three times per day  Vitamin B-12 500 mcg  Sublingually daily  Reviewed dosage and timing of vitamin/mineral guidelines.    Reviewed common nutritional concerns and prevention tips after bariatric surgery.  Reminded not to lift anything greater than 10 lbs for 6 week post-surgery  Pt verbalized understanding of information provided with appropriate questions and comments.         SESSION TIME: 15 minutes                          This service was not originating from a related E/M service provided within the previous 7 days nor will  to an E/M service or procedure within the next 24 hours or my soonest available appointment.  Prevailing standard of care was able to be met in this audio-only visit.

## 2024-08-12 NOTE — PROGRESS NOTES
Audio Only Telehealth Visit     The patient location is: ***  The chief complaint leading to consultation is: ***  Visit type: Virtual visit with audio only (telephone)  Total time spent with patient: ***     The reason for the audio only service rather than synchronous audio and video virtual visit was related to technical difficulties or patient preference/necessity.     Each patient to whom I provide medical services by telemedicine is:  (1) informed of the relationship between the physician and patient and the respective role of any other health care provider with respect to management of the patient; and (2) notified that they may decline to receive medical services by telemedicine and may withdraw from such care at any time. Patient verbally consented to receive this service via voice-only telephone call.    This service was not originating from a related E/M service provided within the previous 7 days nor will  to an E/M service or procedure within the next 24 hours or my soonest available appointment.  Prevailing standard of care was able to be met in this audio-only visit.      NUTRITION NOTE    Bariatric Surgeon: {Physician:[id number]}  Reason for MNT Referral: Pre-op liquid diet and nutrition instructions  Procedure: {Surgery:45365}  Date of Surgery:     Pre-op liquid diet  Pt using *** for preop liquid diet  Fluids include:     Discussion:  -  gms of protein per day  - 600-800 calories per day   - Less than 4gm sugar per shake  - Sugar-free, decaffeinated, non-carbonated fluids  - No fruits, juices, yogurt or pudding on liquid diet  - No vitamins for 1 week prior to surgery  - No herbal supplements including green tea and fish oils for 2 weeks prior to surgery     Post-op instructions  - Reviewed nutritional guidelines for post-surgery.    - 1 ounce medicine cups and tracking sheet provided for patient to measure and track fluid intake after surgery.  - Start with 1 oz clear liquids every 15  minutes following surgery, and to increase as tolerated. Aim for 48 fl oz clear fluids daily (includes clear protein drinks and protein soups).  - May begin sipping on protein shakes, as long as maintaining 48 fl oz non-caffeinated clear liquids per day.  - Reviewed bariatric vitamin/mineral regimen, starts 1 week after surgery as tolerated.  - Reviewed common nutritional concerns and prevention tips after bariatric surgery.  - Reminded not to lift anything greater than 10 lbs for 6 week post-surgery   - Encouraged PT to walk as much as tolerable after surgery.     Pt has the following vitamins and minerals to start taking one week after being discharged from hospital:    Multivitamin with *** mg iron  B-1/Super B complex with  *** mg thiamine  Calcium Citrate *** mg with vitamin D ***   Vitamin B-12 sublingual *** mcg     Reviewed dosage and timing of vitamin/mineral guidelines.    Remind pt per nursing and medical team to inform our department if taking antibiotics within the 30 days post bariatric surgery or it any other surgeries/procedures are scheduled within 30 days after bariatric surgery.    Pt verbalized understanding of information provided with appropriate questions and comments.     SESSION TIME: *** minutes

## 2024-08-14 ENCOUNTER — LAB VISIT (OUTPATIENT)
Dept: LAB | Facility: HOSPITAL | Age: 54
End: 2024-08-14
Attending: SURGERY
Payer: COMMERCIAL

## 2024-08-14 ENCOUNTER — OFFICE VISIT (OUTPATIENT)
Dept: BARIATRICS | Facility: CLINIC | Age: 54
End: 2024-08-14
Payer: COMMERCIAL

## 2024-08-14 VITALS
SYSTOLIC BLOOD PRESSURE: 141 MMHG | DIASTOLIC BLOOD PRESSURE: 67 MMHG | HEART RATE: 56 BPM | WEIGHT: 279.13 LBS | BODY MASS INDEX: 47.65 KG/M2 | HEIGHT: 64 IN | OXYGEN SATURATION: 99 %

## 2024-08-14 DIAGNOSIS — R73.03 PREDIABETES: ICD-10-CM

## 2024-08-14 DIAGNOSIS — G47.33 OSA ON CPAP: ICD-10-CM

## 2024-08-14 DIAGNOSIS — I10 ESSENTIAL HYPERTENSION: ICD-10-CM

## 2024-08-14 DIAGNOSIS — F33.42 RECURRENT MAJOR DEPRESSIVE DISORDER, IN FULL REMISSION: ICD-10-CM

## 2024-08-14 DIAGNOSIS — F41.1 GENERALIZED ANXIETY DISORDER: ICD-10-CM

## 2024-08-14 DIAGNOSIS — Z98.84 S/P LAPAROSCOPIC SLEEVE GASTRECTOMY: ICD-10-CM

## 2024-08-14 DIAGNOSIS — E66.01 CLASS 3 OBESITY: ICD-10-CM

## 2024-08-14 DIAGNOSIS — E66.01 CLASS 3 SEVERE OBESITY DUE TO EXCESS CALORIES WITH SERIOUS COMORBIDITY AND BODY MASS INDEX (BMI) OF 45.0 TO 49.9 IN ADULT: Primary | ICD-10-CM

## 2024-08-14 DIAGNOSIS — G47.33 OSA (OBSTRUCTIVE SLEEP APNEA): ICD-10-CM

## 2024-08-14 PROBLEM — M25.662 DECREASED RANGE OF MOTION (ROM) OF LEFT KNEE: Status: RESOLVED | Noted: 2022-06-29 | Resolved: 2024-08-14

## 2024-08-14 PROBLEM — M54.50 ACUTE BILATERAL LOW BACK PAIN WITHOUT SCIATICA: Status: RESOLVED | Noted: 2024-01-10 | Resolved: 2024-08-14

## 2024-08-14 PROBLEM — R29.898 WEAKNESS OF LEFT LOWER EXTREMITY: Status: RESOLVED | Noted: 2022-06-29 | Resolved: 2024-08-14

## 2024-08-14 PROBLEM — M54.9 UPPER BACK PAIN: Status: RESOLVED | Noted: 2024-01-10 | Resolved: 2024-08-14

## 2024-08-14 PROBLEM — G56.03 BILATERAL CARPAL TUNNEL SYNDROME: Status: RESOLVED | Noted: 2024-01-10 | Resolved: 2024-08-14

## 2024-08-14 PROBLEM — M62.81 MUSCLE WEAKNESS OF LOWER EXTREMITY: Status: RESOLVED | Noted: 2024-02-12 | Resolved: 2024-08-14

## 2024-08-14 PROBLEM — H31.8 IDIOPATHIC POLYPOIDAL CHOROIDAL VASCULOPATHY: Status: RESOLVED | Noted: 2020-09-16 | Resolved: 2024-08-14

## 2024-08-14 LAB
ALBUMIN SERPL BCP-MCNC: 3.9 G/DL (ref 3.5–5.2)
ALP SERPL-CCNC: 107 U/L (ref 55–135)
ALT SERPL W/O P-5'-P-CCNC: 24 U/L (ref 10–44)
ANION GAP SERPL CALC-SCNC: 12 MMOL/L (ref 8–16)
AST SERPL-CCNC: 19 U/L (ref 10–40)
BASOPHILS # BLD AUTO: 0.06 K/UL (ref 0–0.2)
BASOPHILS NFR BLD: 0.8 % (ref 0–1.9)
BILIRUB SERPL-MCNC: 0.6 MG/DL (ref 0.1–1)
BUN SERPL-MCNC: 16 MG/DL (ref 6–20)
CALCIUM SERPL-MCNC: 9.8 MG/DL (ref 8.7–10.5)
CHLORIDE SERPL-SCNC: 103 MMOL/L (ref 95–110)
CO2 SERPL-SCNC: 24 MMOL/L (ref 23–29)
CREAT SERPL-MCNC: 0.8 MG/DL (ref 0.5–1.4)
DIFFERENTIAL METHOD BLD: ABNORMAL
EOSINOPHIL # BLD AUTO: 0.1 K/UL (ref 0–0.5)
EOSINOPHIL NFR BLD: 1.1 % (ref 0–8)
ERYTHROCYTE [DISTWIDTH] IN BLOOD BY AUTOMATED COUNT: 13 % (ref 11.5–14.5)
EST. GFR  (NO RACE VARIABLE): >60 ML/MIN/1.73 M^2
ESTIMATED AVG GLUCOSE: 120 MG/DL (ref 68–131)
GLUCOSE SERPL-MCNC: 114 MG/DL (ref 70–110)
HBA1C MFR BLD: 5.8 % (ref 4–5.6)
HCT VFR BLD AUTO: 40.2 % (ref 37–48.5)
HGB BLD-MCNC: 12.8 G/DL (ref 12–16)
IMM GRANULOCYTES # BLD AUTO: 0.02 K/UL (ref 0–0.04)
IMM GRANULOCYTES NFR BLD AUTO: 0.3 % (ref 0–0.5)
LYMPHOCYTES # BLD AUTO: 1.4 K/UL (ref 1–4.8)
LYMPHOCYTES NFR BLD: 19.5 % (ref 18–48)
MCH RBC QN AUTO: 29.6 PG (ref 27–31)
MCHC RBC AUTO-ENTMCNC: 31.8 G/DL (ref 32–36)
MCV RBC AUTO: 93 FL (ref 82–98)
MONOCYTES # BLD AUTO: 0.6 K/UL (ref 0.3–1)
MONOCYTES NFR BLD: 7.9 % (ref 4–15)
NEUTROPHILS # BLD AUTO: 5.2 K/UL (ref 1.8–7.7)
NEUTROPHILS NFR BLD: 70.4 % (ref 38–73)
NRBC BLD-RTO: 0 /100 WBC
PLATELET # BLD AUTO: 307 K/UL (ref 150–450)
PMV BLD AUTO: 10.4 FL (ref 9.2–12.9)
POTASSIUM SERPL-SCNC: 4.1 MMOL/L (ref 3.5–5.1)
PROT SERPL-MCNC: 7.7 G/DL (ref 6–8.4)
RBC # BLD AUTO: 4.32 M/UL (ref 4–5.4)
SODIUM SERPL-SCNC: 139 MMOL/L (ref 136–145)
WBC # BLD AUTO: 7.32 K/UL (ref 3.9–12.7)

## 2024-08-14 PROCEDURE — 36415 COLL VENOUS BLD VENIPUNCTURE: CPT | Performed by: SURGERY

## 2024-08-14 PROCEDURE — 3044F HG A1C LEVEL LT 7.0%: CPT | Mod: CPTII,S$GLB,, | Performed by: SURGERY

## 2024-08-14 PROCEDURE — 3008F BODY MASS INDEX DOCD: CPT | Mod: CPTII,S$GLB,, | Performed by: SURGERY

## 2024-08-14 PROCEDURE — 83036 HEMOGLOBIN GLYCOSYLATED A1C: CPT | Performed by: SURGERY

## 2024-08-14 PROCEDURE — 1160F RVW MEDS BY RX/DR IN RCRD: CPT | Mod: CPTII,S$GLB,, | Performed by: SURGERY

## 2024-08-14 PROCEDURE — 80053 COMPREHEN METABOLIC PANEL: CPT | Performed by: SURGERY

## 2024-08-14 PROCEDURE — 85025 COMPLETE CBC W/AUTO DIFF WBC: CPT | Performed by: SURGERY

## 2024-08-14 PROCEDURE — 3078F DIAST BP <80 MM HG: CPT | Mod: CPTII,S$GLB,, | Performed by: SURGERY

## 2024-08-14 PROCEDURE — 99999 PR PBB SHADOW E&M-EST. PATIENT-LVL III: CPT | Mod: PBBFAC,,, | Performed by: SURGERY

## 2024-08-14 PROCEDURE — 3077F SYST BP >= 140 MM HG: CPT | Mod: CPTII,S$GLB,, | Performed by: SURGERY

## 2024-08-14 PROCEDURE — 1159F MED LIST DOCD IN RCRD: CPT | Mod: CPTII,S$GLB,, | Performed by: SURGERY

## 2024-08-14 PROCEDURE — 99214 OFFICE O/P EST MOD 30 MIN: CPT | Mod: S$GLB,,, | Performed by: SURGERY

## 2024-08-14 RX ORDER — ACETAMINOPHEN 500MG/15ML
30 LIQUID (ML) ORAL EVERY 8 HOURS
Qty: 600 ML | Refills: 0 | Status: SHIPPED | OUTPATIENT
Start: 2024-08-14

## 2024-08-14 RX ORDER — GABAPENTIN 250 MG/5ML
300 SOLUTION ORAL 2 TIMES DAILY
OUTPATIENT
Start: 2024-08-14

## 2024-08-14 RX ORDER — ONDANSETRON 8 MG/1
8 TABLET, ORALLY DISINTEGRATING ORAL EVERY 6 HOURS PRN
Qty: 30 TABLET | Refills: 0 | Status: SHIPPED | OUTPATIENT
Start: 2024-08-14

## 2024-08-14 RX ORDER — URSODIOL 500 MG/1
500 TABLET, FILM COATED ORAL DAILY
Qty: 30 TABLET | Refills: 5 | Status: SHIPPED | OUTPATIENT
Start: 2024-08-14 | End: 2025-02-10

## 2024-08-14 RX ORDER — THIAMINE HCL 250 MG
250 TABLET ORAL
COMMUNITY

## 2024-08-14 RX ORDER — DEXTROMETHORPHAN/PSEUDOEPHED 2.5-7.5/.8
40 DROPS ORAL 4 TIMES DAILY PRN
OUTPATIENT
Start: 2024-08-14

## 2024-08-14 RX ORDER — GABAPENTIN 300 MG/1
300 CAPSULE ORAL 2 TIMES DAILY
Qty: 10 CAPSULE | Refills: 0 | Status: SHIPPED | OUTPATIENT
Start: 2024-08-14

## 2024-08-14 RX ORDER — ACETAMINOPHEN 500 MG
2000 TABLET ORAL DAILY
COMMUNITY

## 2024-08-14 RX ORDER — KETOROLAC TROMETHAMINE 15 MG/ML
15 INJECTION, SOLUTION INTRAMUSCULAR; INTRAVENOUS ONCE
OUTPATIENT
Start: 2024-08-14 | End: 2024-08-14

## 2024-08-14 RX ORDER — PROCHLORPERAZINE EDISYLATE 5 MG/ML
5 INJECTION INTRAMUSCULAR; INTRAVENOUS EVERY 6 HOURS PRN
OUTPATIENT
Start: 2024-08-14

## 2024-08-14 RX ORDER — PANTOPRAZOLE SODIUM 40 MG/10ML
40 INJECTION, POWDER, LYOPHILIZED, FOR SOLUTION INTRAVENOUS 2 TIMES DAILY
OUTPATIENT
Start: 2024-08-14

## 2024-08-14 RX ORDER — LIDOCAINE HYDROCHLORIDE 10 MG/ML
1 INJECTION, SOLUTION EPIDURAL; INFILTRATION; INTRACAUDAL; PERINEURAL ONCE
OUTPATIENT
Start: 2024-08-14 | End: 2024-08-14

## 2024-08-14 RX ORDER — SODIUM CHLORIDE 9 MG/ML
INJECTION, SOLUTION INTRAVENOUS CONTINUOUS
OUTPATIENT
Start: 2024-08-14

## 2024-08-14 RX ORDER — FAMOTIDINE 10 MG/ML
20 INJECTION INTRAVENOUS ONCE
OUTPATIENT
Start: 2024-08-14 | End: 2024-08-14

## 2024-08-14 RX ORDER — ONDANSETRON HYDROCHLORIDE 2 MG/ML
8 INJECTION, SOLUTION INTRAVENOUS EVERY 6 HOURS
OUTPATIENT
Start: 2024-08-14

## 2024-08-14 RX ORDER — SODIUM CHLORIDE, SODIUM LACTATE, POTASSIUM CHLORIDE, CALCIUM CHLORIDE 600; 310; 30; 20 MG/100ML; MG/100ML; MG/100ML; MG/100ML
INJECTION, SOLUTION INTRAVENOUS CONTINUOUS
OUTPATIENT
Start: 2024-08-14

## 2024-08-14 RX ORDER — HEPARIN SODIUM 5000 [USP'U]/ML
5000 INJECTION, SOLUTION INTRAVENOUS; SUBCUTANEOUS ONCE
OUTPATIENT
Start: 2024-08-14 | End: 2024-08-14

## 2024-08-14 RX ORDER — IBUPROFEN 100 MG/5ML
1000 SUSPENSION, ORAL (FINAL DOSE FORM) ORAL DAILY
COMMUNITY

## 2024-08-14 RX ORDER — FERROUS GLUCONATE 324(38)MG
324 TABLET ORAL
COMMUNITY

## 2024-08-14 RX ORDER — ACETAMINOPHEN 650 MG/20.3ML
1000 LIQUID ORAL EVERY 8 HOURS
OUTPATIENT
Start: 2024-08-14

## 2024-08-14 RX ORDER — ACETAMINOPHEN 650 MG/20.3ML
500 LIQUID ORAL
OUTPATIENT
Start: 2024-08-14

## 2024-08-14 RX ORDER — ENOXAPARIN SODIUM 100 MG/ML
40 INJECTION SUBCUTANEOUS EVERY 12 HOURS
OUTPATIENT
Start: 2024-08-14

## 2024-08-14 RX ORDER — OXYCODONE HCL 5 MG/5 ML
5 SOLUTION, ORAL ORAL EVERY 6 HOURS PRN
OUTPATIENT
Start: 2024-08-14

## 2024-08-14 RX ORDER — SCOLOPAMINE TRANSDERMAL SYSTEM 1 MG/1
1 PATCH, EXTENDED RELEASE TRANSDERMAL ONCE
OUTPATIENT
Start: 2024-08-14 | End: 2024-08-14

## 2024-08-14 RX ORDER — MUPIROCIN 20 MG/G
OINTMENT TOPICAL
OUTPATIENT
Start: 2024-08-14

## 2024-08-14 RX ORDER — OMEPRAZOLE 40 MG/1
40 CAPSULE, DELAYED RELEASE ORAL EVERY MORNING
Qty: 30 CAPSULE | Refills: 2 | Status: SHIPPED | OUTPATIENT
Start: 2024-08-14

## 2024-08-14 RX ORDER — ACETAMINOPHEN 10 MG/ML
1000 INJECTION, SOLUTION INTRAVENOUS ONCE
OUTPATIENT
Start: 2024-08-14 | End: 2024-08-14

## 2024-08-14 NOTE — H&P (VIEW-ONLY)
History & Physical    SUBJECTIVE:     History of Present Illness:  Criss Link is a 54 year-old morbidly obese female with BMI 48.63 kg/m² and multiple associated comorbidities including HTN, CATE, CBP, MDD/NIKOLAY, and prediabetes, who presents for pre-operative exam for weight loss surgery. She has failed multiple attempts at non-surgical methods of weight loss and has completed the [facility]ariat[facility] program which she started on 4/15/24 at which time her BMI was 49.53 kg/m². She meets NIH consensus criteria for bariatric surgery and is interested in undergoing laparoscopic sleeve gastrectomy.      Labs 5/9/24: HbA1c 6.0%, Chol 223, B1 11  CXR 4/20/24: No acute cardiopulmonary process  EKG 5/9/24: NSR  Stress echo 5/9/24: Mildly increased LV wall thickness with mild concentric hypertrophy, EF 55-60%, intermediate CVP 8 mmHg, nausea and SOB during stress, 1.0-mm horizontal ST segment depression noted during stress, no arrhythmias, ECG positive for ischemia, neg for stress-induced ischemia     Clearances:  Cardiology: Mclaughlin 5/15/24  Psych: Ball 5/13/24    Chief Complaint   Patient presents with    Pre-op Exam     LSG 8/26/24     Review of patient's allergies indicates:  No Known Allergies    Current Outpatient Medications   Medication Sig    amLODIPine (NORVASC) 5 MG tablet Take 1 tablet (5 mg total) by mouth once daily.    azelastine (ASTELIN) 137 mcg (0.1 %) nasal spray SPRAY 1 SPRAY (137 MCG TOTAL) BY NASAL ROUTE 2 (TWO) TIMES DAILY.    dorzolamide (TRUSOPT) 2 % ophthalmic solution Place 1 drop into both eyes 2 (two) times daily.    montelukast (SINGULAIR) 10 mg tablet TAKE 1 TABLET BY MOUTH EVERY DAY IN THE EVENING    sertraline (ZOLOFT) 100 MG tablet Take 1 tablet (100 mg total) by mouth once daily.    spironolactone (ALDACTONE) 50 MG tablet Take 1 tablet (50 mg total) by mouth once daily.    tiZANidine (ZANAFLEX) 4 MG tablet TAKE 1 TABLET (4 MG TOTAL) BY MOUTH NIGHTLY AS NEEDED (BACK PAIN).     acetaminophen 500 mg/15 mL Liqd Take 30 mLs (1,000 mg total) by mouth every 8 (eight) hours. Take 1g (30mL) every 8 hours for at least 3 days and up to 5 days as needed. May take one additional dose (500mg or 15mL) per day for breakthrough. Do not exceed 4g in 24 hours.    gabapentin (NEURONTIN) 300 MG capsule Take 1 capsule (300 mg total) by mouth 2 (two) times daily. Open capsule and empty prior to taking. Take one dose on morning of surgery prior to arrival. Take 1 capsule (300 mg) twice a day for 3 days post-op. Continue for an additional 2 days as needed.    meloxicam (MOBIC) 15 MG tablet Take 1 tablet (15 mg total) by mouth once daily. (Patient not taking: Reported on 8/14/2024)    metFORMIN (GLUCOPHAGE-XR) 500 MG ER 24hr tablet Take 1 tablet (500 mg total) by mouth 2 (two) times daily with meals. (Patient not taking: Reported on 8/14/2024)    omeprazole (PRILOSEC) 40 MG capsule Take 1 capsule (40 mg total) by mouth every morning. Open capsule and take with apple sauce    ondansetron (ZOFRAN-ODT) 8 MG TbDL Take 1 tablet (8 mg total) by mouth every 6 (six) hours as needed (nausea).    ursodioL (ACTIGALL) 500 MG tablet Take 1 tablet (500 mg total) by mouth once daily.     No current facility-administered medications for this visit.     Past Medical History:   Diagnosis Date    Allergies     Depression     Hypertension     Idiopathic polypoidal choroidal vasculopathy     Prediabetes      Past Surgical History:   Procedure Laterality Date    TUBAL LIGATION  12/30/2004    [facility]     Review of Systems   Constitutional:  Negative for chills, diaphoresis, fever and malaise/fatigue.   HENT:  Positive for congestion. Negative for hearing loss and sore throat.    Eyes:  Negative for blurred vision and double vision.   Respiratory:  Negative for cough and shortness of breath.    Cardiovascular:  Negative for chest pain, palpitations, orthopnea, claudication, leg swelling and PND.   Gastrointestinal:  Positive for  "constipation. Negative for abdominal pain, blood in stool, diarrhea, heartburn, nausea and vomiting.   Genitourinary:  Negative for dysuria and urgency.   Musculoskeletal:  Negative for back pain, falls, joint pain, myalgias and neck pain.   Skin:  Negative for itching and rash.   Neurological:  Negative for weakness and headaches.   Endo/Heme/Allergies:  Does not bruise/bleed easily.   Psychiatric/Behavioral:  Negative for depression and substance abuse.      OBJECTIVE:     Vitals:    08/14/24 0800   BP: (!) 141/67   Pulse: (!) 56   SpO2: 99%   Weight: 126.6 kg (279 lb 1.6 oz)   Height: 5' 4" (1.626 m)       Physical Exam  Vitals reviewed.   Constitutional:       General: She is not in acute distress.     Appearance: Normal appearance. She is obese.   HENT:      Head: Normocephalic and atraumatic.   Eyes:      Conjunctiva/sclera: Conjunctivae normal.   Neck:      Thyroid: No thyromegaly.   Cardiovascular:      Rate and Rhythm: Normal rate and regular rhythm.      Heart sounds: Normal heart sounds.   Pulmonary:      Effort: Pulmonary effort is normal. No respiratory distress.      Breath sounds: Normal breath sounds.   Abdominal:      General: There is no distension.      Palpations: Abdomen is soft.      Tenderness: There is no abdominal tenderness. There is no guarding or rebound.   Musculoskeletal:         General: Normal range of motion.      Cervical back: Normal range of motion and neck supple.      Right lower leg: No edema.      Left lower leg: No edema.   Skin:     General: Skin is warm and dry.      Findings: No rash.   Neurological:      General: No focal deficit present.      Mental Status: She is alert and oriented to person, place, and time.      Gait: Gait is intact.   Psychiatric:         Mood and Affect: Mood and affect normal.         Behavior: Behavior normal.         Cognition and Memory: Memory normal.      Laboratory  CBC: Reviewed  BMP: Reviewed  LFTs: Reviewed  Bariatric Labs: " Reviewed    Diagnostic Results:  Labs: Reviewed  ECG: Reviewed  X-Ray: Reviewed  Echo: Reviewed     Dietitian: Patient has participated in pre-operative nutritional program with understanding of necessary lifelong dietary changes required with surgery.    Psych: No overt contraindications to bariatric surgery, patient has completed psychological evaluation and is able to give informed consent.    PCP: Medically cleared for surgery.     ASSESSMENT/PLAN:     Morbid obesity with failure of medical conservative therapy.    Co Morbid Conditions:   Patient Active Problem List   Diagnosis    Class 3 severe obesity due to excess calories with serious comorbidity and body mass index (BMI) of 45.0 to 49.9 in adult    Essential hypertension    Prediabetes    Seasonal allergies    Neovascular age-related macular degeneration    Idiopathic polypoidal choroidal vasculopathy    CATE (obstructive sleep apnea)    Vitamin D insufficiency    Decreased range of motion (ROM) of left knee    Weakness of left lower extremity    Generalized anxiety disorder    Recurrent major depressive disorder, in full remission    Hirsutism    Chronic rhinitis    Acute bilateral low back pain without sciatica    Upper back pain    Bilateral carpal tunnel syndrome    Chronic bilateral low back pain without sciatica    Muscle weakness of lower extremity    Thiamine deficiency     Patient wishes to undergo laparoscopic sleeve gastrectomy. She is scheduled for 8/26/24. She started the pre-op liquid diet on Monday 8/12/24.     The patient was informed that risks of LSG include, but are not limited to: bleeding, infection, injury to intraabdominal structures such as bowel, stomach, esophagus, liver, and spleen; DVT/PE, cardiopulmonary complications such as MI, stroke or PNA; stricture requiring dilations, incisional hernia, gallstones, exacerbation of mood disorders, vitamin/mineral deficiencies, worsening GERD, failure of weight loss or  weight regain, possible conversion to an open operation, and staple-line leak which may require surgical or endoscopic interventions, drains, antibiotics and NPO status with IV nutrition.     We discussed that our goal is to ameliorate her medical problems and not to obtain a specific body mass index. All questions were answered to her satisfaction and she has elected to proceed with surgery. Consent for surgery and blood transfusion were signed. Prescriptions for ondansetron, omeprazole, ursodiol and acetaminophen were sent to the pharmacy for bedside delivery. Patient discussed perioperative instructions with the Bariatric RN.     Edyta Rosas  8/14/2024

## 2024-08-14 NOTE — PROGRESS NOTES
History & Physical    SUBJECTIVE:     History of Present Illness:  Criss Link is a 54 year-old morbidly obese female with BMI 48.63 kg/m² and multiple associated comorbidities including HTN, CATE, CBP, MDD/NIKOLAY, and prediabetes, who presents for pre-operative exam for weight loss surgery. She has failed multiple attempts at non-surgical methods of weight loss and has completed the [facility] program which she started on 4/15/24 at which time her BMI was 49.53 kg/m². She meets NIH consensus criteria for bariatric surgery and is interested in undergoing laparoscopic sleeve gastrectomy.      Labs 5/9/24: HbA1c 6.0%, Chol 223, B1 11  CXR 4/20/24: No acute cardiopulmonary process  EKG 5/9/24: NSR  Stress echo 5/9/24: Mildly increased LV wall thickness with mild concentric hypertrophy, EF 55-60%, intermediate CVP 8 mmHg, nausea and SOB during stress, 1.0-mm horizontal ST segment depression noted during stress, no arrhythmias, ECG positive for ischemia, neg for stress-induced ischemia     Clearances:  Cardiology: Mclaughlin 5/15/24  Psych: Ball 5/13/24    Chief Complaint   Patient presents with    Pre-op Exam     LSG 8/26/24     Review of patient's allergies indicates:  No Known Allergies    Current Outpatient Medications   Medication Sig    amLODIPine (NORVASC) 5 MG tablet Take 1 tablet (5 mg total) by mouth once daily.    azelastine (ASTELIN) 137 mcg (0.1 %) nasal spray SPRAY 1 SPRAY (137 MCG TOTAL) BY NASAL ROUTE 2 (TWO) TIMES DAILY.    dorzolamide (TRUSOPT) 2 % ophthalmic solution Place 1 drop into both eyes 2 (two) times daily.    montelukast (SINGULAIR) 10 mg tablet TAKE 1 TABLET BY MOUTH EVERY DAY IN THE EVENING    sertraline (ZOLOFT) 100 MG tablet Take 1 tablet (100 mg total) by mouth once daily.    spironolactone (ALDACTONE) 50 MG tablet Take 1 tablet (50 mg total) by mouth once daily.    tiZANidine (ZANAFLEX) 4 MG tablet TAKE 1 TABLET (4 MG TOTAL) BY MOUTH NIGHTLY AS NEEDED (BACK PAIN).     acetaminophen 500 mg/15 mL Liqd Take 30 mLs (1,000 mg total) by mouth every 8 (eight) hours. Take 1g (30mL) every 8 hours for at least 3 days and up to 5 days as needed. May take one additional dose (500mg or 15mL) per day for breakthrough. Do not exceed 4g in 24 hours.    gabapentin (NEURONTIN) 300 MG capsule Take 1 capsule (300 mg total) by mouth 2 (two) times daily. Open capsule and empty prior to taking. Take one dose on morning of surgery prior to arrival. Take 1 capsule (300 mg) twice a day for 3 days post-op. Continue for an additional 2 days as needed.    meloxicam (MOBIC) 15 MG tablet Take 1 tablet (15 mg total) by mouth once daily. (Patient not taking: Reported on 8/14/2024)    metFORMIN (GLUCOPHAGE-XR) 500 MG ER 24hr tablet Take 1 tablet (500 mg total) by mouth 2 (two) times daily with meals. (Patient not taking: Reported on 8/14/2024)    omeprazole (PRILOSEC) 40 MG capsule Take 1 capsule (40 mg total) by mouth every morning. Open capsule and take with apple sauce    ondansetron (ZOFRAN-ODT) 8 MG TbDL Take 1 tablet (8 mg total) by mouth every 6 (six) hours as needed (nausea).    ursodioL (ACTIGALL) 500 MG tablet Take 1 tablet (500 mg total) by mouth once daily.     No current facility-administered medications for this visit.     Past Medical History:   Diagnosis Date    Allergies     Depression     Hypertension     Idiopathic polypoidal choroidal vasculopathy     Prediabetes      Past Surgical History:   Procedure Laterality Date    TUBAL LIGATION  12/30/2004    [facility]     Review of Systems   Constitutional:  Negative for chills, diaphoresis, fever and malaise/fatigue.   HENT:  Positive for congestion. Negative for hearing loss and sore throat.    Eyes:  Negative for blurred vision and double vision.   Respiratory:  Negative for cough and shortness of breath.    Cardiovascular:  Negative for chest pain, palpitations, orthopnea, claudication, leg swelling and PND.   Gastrointestinal:  Positive for  "constipation. Negative for abdominal pain, blood in stool, diarrhea, heartburn, nausea and vomiting.   Genitourinary:  Negative for dysuria and urgency.   Musculoskeletal:  Negative for back pain, falls, joint pain, myalgias and neck pain.   Skin:  Negative for itching and rash.   Neurological:  Negative for weakness and headaches.   Endo/Heme/Allergies:  Does not bruise/bleed easily.   Psychiatric/Behavioral:  Negative for depression and substance abuse.      OBJECTIVE:     Vitals:    08/14/24 0800   BP: (!) 141/67   Pulse: (!) 56   SpO2: 99%   Weight: 126.6 kg (279 lb 1.6 oz)   Height: 5' 4" (1.626 m)       Physical Exam  Vitals reviewed.   Constitutional:       General: She is not in acute distress.     Appearance: Normal appearance. She is obese.   HENT:      Head: Normocephalic and atraumatic.   Eyes:      Conjunctiva/sclera: Conjunctivae normal.   Neck:      Thyroid: No thyromegaly.   Cardiovascular:      Rate and Rhythm: Normal rate and regular rhythm.      Heart sounds: Normal heart sounds.   Pulmonary:      Effort: Pulmonary effort is normal. No respiratory distress.      Breath sounds: Normal breath sounds.   Abdominal:      General: There is no distension.      Palpations: Abdomen is soft.      Tenderness: There is no abdominal tenderness. There is no guarding or rebound.   Musculoskeletal:         General: Normal range of motion.      Cervical back: Normal range of motion and neck supple.      Right lower leg: No edema.      Left lower leg: No edema.   Skin:     General: Skin is warm and dry.      Findings: No rash.   Neurological:      General: No focal deficit present.      Mental Status: She is alert and oriented to person, place, and time.      Gait: Gait is intact.   Psychiatric:         Mood and Affect: Mood and affect normal.         Behavior: Behavior normal.         Cognition and Memory: Memory normal.      Laboratory  CBC: Reviewed  BMP: Reviewed  LFTs: Reviewed  Bariatric Labs: " Reviewed    Diagnostic Results:  Labs: Reviewed  ECG: Reviewed  X-Ray: Reviewed  Echo: Reviewed     Dietitian: Patient has participated in pre-operative nutritional program with understanding of necessary lifelong dietary changes required with surgery.    Psych: No overt contraindications to bariatric surgery, patient has completed psychological evaluation and is able to give informed consent.    PCP: Medically cleared for surgery.     ASSESSMENT/PLAN:     Morbid obesity with failure of medical conservative therapy.    Co Morbid Conditions:   Patient Active Problem List   Diagnosis    Class 3 severe obesity due to excess calories with serious comorbidity and body mass index (BMI) of 45.0 to 49.9 in adult    Essential hypertension    Prediabetes    Seasonal allergies    Neovascular age-related macular degeneration    Idiopathic polypoidal choroidal vasculopathy    CATE (obstructive sleep apnea)    Vitamin D insufficiency    Decreased range of motion (ROM) of left knee    Weakness of left lower extremity    Generalized anxiety disorder    Recurrent major depressive disorder, in full remission    Hirsutism    Chronic rhinitis    Acute bilateral low back pain without sciatica    Upper back pain    Bilateral carpal tunnel syndrome    Chronic bilateral low back pain without sciatica    Muscle weakness of lower extremity    Thiamine deficiency     Patient wishes to undergo laparoscopic sleeve gastrectomy. She is scheduled for 8/26/24. She started the pre-op liquid diet on Monday 8/12/24.     The patient was informed that risks of LSG include, but are not limited to: bleeding, infection, injury to intraabdominal structures such as bowel, stomach, esophagus, liver, and spleen; DVT/PE, cardiopulmonary complications such as MI, stroke or PNA; stricture requiring dilations, incisional hernia, gallstones, exacerbation of mood disorders, vitamin/mineral deficiencies, worsening GERD, failure of weight loss or  weight regain, possible conversion to an open operation, and staple-line leak which may require surgical or endoscopic interventions, drains, antibiotics and NPO status with IV nutrition.     We discussed that our goal is to ameliorate her medical problems and not to obtain a specific body mass index. All questions were answered to her satisfaction and she has elected to proceed with surgery. Consent for surgery and blood transfusion were signed. Prescriptions for ondansetron, omeprazole, ursodiol and acetaminophen were sent to the pharmacy for bedside delivery. Patient discussed perioperative instructions with the Bariatric RN.     Edyta Rosas  8/14/2024

## 2024-08-20 ENCOUNTER — TELEPHONE (OUTPATIENT)
Dept: BARIATRICS | Facility: CLINIC | Age: 54
End: 2024-08-20
Payer: COMMERCIAL

## 2024-08-20 NOTE — TELEPHONE ENCOUNTER
Returned pt call. Pt stated she has a few questions. Pt inquired about drinking coffee prior to sx. Informed pt that she could coffee prior to sx, but postop she would have to switch to decaffeinated d/t caffeine causes dehydration. Reminded pt to stop all Vitamin supplements 1 wk prior to sx. And pt inquired about having sex pre and post sx. Informed pt that preop sex would be okay, but postop she needed to be careful not to lift, push, or pull anything greater than 10 lbs and her positioning should be missionary. Understanding stated. All questions and concerns addressed.

## 2024-08-22 RX ORDER — LORATADINE 10 MG/1
10 TABLET ORAL DAILY
COMMUNITY

## 2024-08-22 NOTE — PRE-PROCEDURE INSTRUCTIONS
PreOp Instructions given:   - Verbal medication information (what to hold and what to take)   - NPO guidelines given/Bariatric Sx Dept  - Arrival place directions given; time to be given the day before procedure by the   Surgeon's Office   - Bathing with antibacterial soap   - Don't wear any jewelry or bring any valuables AM of surgery   - No makeup or moisturizer to face   - No perfume/cologne, powder, lotions or aftershave   Pt. verbalized understanding.   Pt denies any h/o Anesthesia/Sedation complications or side effects.  Patient does not know arrival time.  Explained that this information comes from the surgeon's office and if they haven't heard from them by 2 or 3 pm to call the office.  Patient stated an understanding.

## 2024-08-23 ENCOUNTER — ANESTHESIA EVENT (OUTPATIENT)
Dept: SURGERY | Facility: HOSPITAL | Age: 54
DRG: 621 | End: 2024-08-23
Payer: COMMERCIAL

## 2024-08-23 ENCOUNTER — TELEPHONE (OUTPATIENT)
Dept: BARIATRICS | Facility: CLINIC | Age: 54
End: 2024-08-23
Payer: COMMERCIAL

## 2024-08-23 NOTE — TELEPHONE ENCOUNTER
Notified patient of arrival time to the [facility] at 0500 with expected surgery start time 0700 on 8/26/24. Instructed patient regarding pre-op instructions including no protein shakes or sugar free clear liquids after midnight but can have a rare sip of water for comfort, showering and preop medications to hold/take per anesthesia/preop. Reminded pt to drink pre-surgery beverage or 8 oz water and take one dose of Gabapentin at 0430. Instructed patient on the s/s of dehydration and for patient to call at the first sign of dehydration. Informed patient that someone from bariatrics will call them 1 week post op to review diet/fluid intake and to ensure adequate hydration. Reminded patient not to take antibiotics for 30 days following surgery or schedule elective procedures involving anesthesia/sedation for 30 days following surgery unless checking with the bariatric clinic first. Pt verbalized understanding. Pt given office phone number for any additional questions/concerns.

## 2024-08-25 RX ORDER — ACETAMINOPHEN 500 MG
1000 TABLET ORAL
OUTPATIENT
Start: 2024-08-26 | End: 2024-08-26

## 2024-08-25 NOTE — ANESTHESIA PREPROCEDURE EVALUATION
[facility]  Anesthesia Pre-Operative Evaluation         Patient Name: Criss Link  YOB: 1970  MRN: [id number]    SUBJECTIVE:     Pre-operative evaluation for Procedure(s) (LRB):  GASTRECTOMY, SLEEVE, LAPAROSCOPIC with intraop EGD (N/A)     08/25/2024    Criss Link is a 54 y.o. female w/ a significant PMHx of morbid obesity (BMI 48), HTN, MDD, CATE.    Patient now presents for the above procedure(s).    Stress echo April 2024:      Left Ventricle: The left ventricle is normal in size. Mildly increased wall thickness. There is mild concentric hypertrophy. There is normal systolic function with a visually estimated ejection fraction of 55 - 60%. There is normal diastolic function.    Right Ventricle: Normal right ventricular cavity size. Systolic function is normal.    IVC/SVC: Intermediate venous pressure at 8 mmHg.    Stress Protocol: The patient was infused intravenously with dobutamine. The patient received a graduated infusion of the stress agent beginning at  mcg/kg/min . The peak heart rate was 146 bpm, which is 91% of age predicted maximum heart rate. The patient was also given atropine. The patient reported nausea and shortness of breath during the stress test.    Stress ECG: There is 1.0 mm of horizontal ST segment depression noted during stress. There are no arrhythmias during stress.    ECG Conclusion: The ECG portion of the study is positive for ischemia. Specificity is reduced secondary to hypertensive response.    Post-stress Impression: The study is negative with no echocardiographic evidence of stress induced ischemia.    LDA: None documented.     Prev airway: None documented.    Drips: None documented.      Patient Active Problem List   Diagnosis    Class 3 severe obesity due to excess calories with serious comorbidity and body mass index (BMI) of 45.0 to 49.9 in adult    Essential hypertension    Prediabetes    Seasonal allergies    Neovascular age-related macular  degeneration    CATE on CPAP    Vitamin D insufficiency    Generalized anxiety disorder    Recurrent major depressive disorder, in full remission    Hirsutism    Chronic rhinitis    Chronic bilateral low back pain without sciatica    Thiamine deficiency       Review of patient's allergies indicates:  No Known Allergies    Current Inpatient Medications:      No current facility-administered medications on file prior to encounter.     Current Outpatient Medications on File Prior to Encounter   Medication Sig Dispense Refill    amLODIPine (NORVASC) 5 MG tablet Take 1 tablet (5 mg total) by mouth once daily. (Patient taking differently: Take 5 mg by mouth every evening.) 90 tablet 3    azelastine (ASTELIN) 137 mcg (0.1 %) nasal spray SPRAY 1 SPRAY (137 MCG TOTAL) BY NASAL ROUTE 2 (TWO) TIMES DAILY. (Patient not taking: Reported on 8/22/2024) 30 mL 5    dorzolamide (TRUSOPT) 2 % ophthalmic solution Place 1 drop into both eyes 2 (two) times daily. (Patient not taking: Reported on 8/22/2024)      loratadine (CLARITIN) 10 mg tablet Take 10 mg by mouth once daily.      meloxicam (MOBIC) 15 MG tablet Take 1 tablet (15 mg total) by mouth once daily. 90 tablet 3    montelukast (SINGULAIR) 10 mg tablet TAKE 1 TABLET BY MOUTH EVERY DAY IN THE EVENING 90 tablet 3    sertraline (ZOLOFT) 100 MG tablet Take 1 tablet (100 mg total) by mouth once daily. (Patient taking differently: Take 100 mg by mouth every evening.) 90 tablet 3    spironolactone (ALDACTONE) 50 MG tablet Take 1 tablet (50 mg total) by mouth once daily. (Patient taking differently: Take 50 mg by mouth nightly.) 90 tablet 3    tiZANidine (ZANAFLEX) 4 MG tablet TAKE 1 TABLET (4 MG TOTAL) BY MOUTH NIGHTLY AS NEEDED (BACK PAIN). 90 tablet 1       Past Surgical History:   Procedure Laterality Date    TUBAL LIGATION  12/30/2004    [facility]       Social History     Socioeconomic History    Marital status:     Number of children: 2   Tobacco Use    Smoking status: Never      Passive exposure: Never    Smokeless tobacco: Never   Substance and Sexual Activity    Alcohol use: Yes     Alcohol/week: 2.0 standard drinks of alcohol     Types: 2 Drinks containing 0.5 oz of alcohol per week     Comment: Socially    Drug use: Never    Sexual activity: Yes     Partners: Male     Birth control/protection: Condom   Social History Narrative    . 1 son and 1 daughter (2023 daughter 19 yo). Mother and father live with her. Mother suffers from alcoholism. Sexually active    Tobacco: None    EtOH: 2 mixed drinks 3 times per week     Drug: None    Employment: Works at  [employer] (Works alongside [employer])     Education: 11th grade      Lives with her mother and 2 teenage children     Social Determinants of Health     Financial Resource Strain: Low Risk  (1/13/2024)    Overall Financial Resource Strain (CARDIA)     Difficulty of Paying Living Expenses: Not hard at all   Food Insecurity: No Food Insecurity (1/13/2024)    Hunger Vital Sign     Worried About Running Out of Food in the Last Year: Never true     Ran Out of Food in the Last Year: Never true   Transportation Needs: No Transportation Needs (1/13/2024)    PRAPARE - Transportation     Lack of Transportation (Medical): No     Lack of Transportation (Non-Medical): No   Physical Activity: Inactive (1/13/2024)    Exercise Vital Sign     Days of Exercise per Week: 0 days     Minutes of Exercise per Session: 0 min   Stress: No Stress Concern Present (1/13/2024)    [employer] - Occupational Stress Questionnaire     Feeling of Stress : Only a little   Housing Stability: Low Risk  (1/13/2024)    Housing Stability Vital Sign     Unable to Pay for Housing in the Last Year: No     Number of Places Lived in the Last Year: 1     Unstable Housing in the Last Year: No       OBJECTIVE:     Vital Signs Range (Last 24H):         Significant Labs:  Lab Results   Component Value Date    WBC 7.32 08/14/2024    HGB 12.8  08/14/2024    HCT 40.2 08/14/2024     08/14/2024    CHOL 223 (H) 05/09/2024    TRIG 73 05/09/2024    HDL 77 (H) 05/09/2024    ALT 24 08/14/2024    AST 19 08/14/2024     08/14/2024    K 4.1 08/14/2024     08/14/2024    CREATININE 0.8 08/14/2024    BUN 16 08/14/2024    CO2 24 08/14/2024    TSH 0.823 05/09/2024    HGBA1C 5.8 (H) 08/14/2024       Diagnostic Studies: No relevant studies.    EKG:   Results for orders placed or performed during the hospital encounter of 05/09/24   EKG    Collection Time: 05/09/24 10:30 AM   Result Value Ref Range    QRS Duration 80 ms    [facility] QTC Calculation 428 ms    Narrative    Test Reason : Screening for CAD    Vent. Rate : 061 BPM     Atrial Rate : 061 BPM     P-R Int : 132 ms          QRS Dur : 080 ms      QT Int : 426 ms       P-R-T Axes : 069 068 045 degrees     QTc Int : 428 ms    Normal sinus rhythm  Normal ECG    Confirmed by Oniel Hutchins MD ([id number]) on 5/10/2024 4:28:34 PM    Referred By: NELSY GERMAIN           Confirmed By:Oniel Hutchins MD       2D ECHO:  TTE:  Results for orders placed or performed during the hospital encounter of 09/22/20   Echo Color Flow Doppler? Yes   Result Value Ref Range    BSA 2.33 m2    TDI SEPTAL 0.10 m/s    LV LATERAL E/E' RATIO 8.07 m/s    LV SEPTAL E/E' RATIO 11.30 m/s    LA WIDTH 4.13 cm    TDI LATERAL 0.14 m/s    PV PEAK VELOCITY 1.25 cm/s    LVIDd 4.54 3.5 - 6.0 cm    IVS 1.12 (A) 0.6 - 1.1 cm    Posterior Wall 0.99 0.6 - 1.1 cm    Ao root annulus 2.78 cm    LVIDs 2.48 2.1 - 4.0 cm    FS 45 28 - 44 %    LA volume 66.13 cm3    STJ 2.59 cm    Ascending aorta 2.70 cm    LV mass 167.42 g    LA size 3.44 cm    TAPSE 2.32 cm    Left Ventricle Relative Wall Thickness 0.44 cm    AV mean gradient 9 mmHg    AV valve area 1.63 cm2    AV Velocity Ratio 0.67     AV index (prosthetic) 0.64     MV valve area p 1/2 method 3.33 cm2    E/A ratio 1.61     Mean e' 0.12 m/s    E wave deceleration time 227.57 msec    Pulm vein S/D ratio 1.67      LVOT diameter 1.80 cm    LVOT area 2.5 cm2    LVOT peak edward 1.36 m/s    LVOT peak VTI 28.26 cm    Ao peak edward 2.02 m/s    Ao VTI 44.23 cm    LVOT stroke volume 71.88 cm3    AV peak gradient 16 mmHg    E/E' ratio 9.42 m/s    MV Peak E Edward 1.13 m/s    TR Max Edward 2.26 m/s    MV stenosis pressure 1/2 time 66.00 ms    MV Peak A Edward 0.70 m/s    PV Peak S Edward 0.82 m/s    PV Peak D Edward 0.49 m/s    LV Systolic Volume 21.88 mL    LV Systolic Volume Index 9.9 mL/m2    LV Diastolic Volume 94.46 mL    LV Diastolic Volume Index 42.78 mL/m2    LA Volume Index 30.0 mL/m2    LV Mass Index 76 g/m2    RA Major Axis 4.82 cm    Left Atrium Minor Axis 5.08 cm    Left Atrium Major Axis 5.94 cm    Triscuspid Valve Regurgitation Peak Gradient 20 mmHg    RA Width 3.96 cm    Right Atrial Pressure (from IVC) 3 mmHg    TV resting pulmonary artery pressure 23 mmHg    Narrative    · There is left ventricular concentric remodeling.  · Normal left ventricular systolic function. The estimated ejection   fraction is 60-65%.  · Normal LV diastolic function.  · Normal right ventricular systolic function.  · The estimated PA systolic pressure is 23 mmHg.  · No wall motion abnormalities.  · Normal central venous pressure (3 mmHg).          RACHANA:  No results found for this or any previous visit.    ASSESSMENT/PLAN:          Pre-op Assessment    I have reviewed the Patient Summary Reports.     I have reviewed the Nursing Notes. I have reviewed the NPO Status.   I have reviewed the Medications.     Review of Systems  Anesthesia Hx:  No problems with previous Anesthesia   History of prior surgery of interest to airway management or planning:             Social:  Non-Smoker, No Alcohol Use       Hematology/Oncology:       -- Denies Anemia:                  Denies Current/Recent Cancer                EENT/Dental:         Denies Otitis Media        Cardiovascular:     Hypertension    Denies CAD.        Denies CHF.    hyperlipidemia                              Pulmonary:    Denies COPD.     Sleep Apnea                Renal/:   Denies Chronic Renal Disease.                Hepatic/GI:      Denies GERD.             Musculoskeletal:  Denies Arthritis.               Neurological:    Denies CVA. Neuromuscular Disease,            Denies Dementia                         Endocrine:  Denies Diabetes.         Morbid Obesity / BMI > 40  Psych:  Psychiatric History  depression                Physical Exam  General: Well nourished, Cooperative and Alert    Airway:  Mallampati: III   Mouth Opening: Normal  TM Distance: Normal  Tongue: Normal  Neck ROM: Normal ROM    Dental:  Intact    Chest/Lungs:  Clear to auscultation, Normal Respiratory Rate    Heart:  Rate: Normal  Rhythm: Regular Rhythm  Sounds: Normal        Anesthesia Plan  Type of Anesthesia, risks & benefits discussed:    Anesthesia Type: Gen ETT  Intra-op Monitoring Plan: Standard ASA Monitors  Post Op Pain Control Plan: multimodal analgesia and IV/PO Opioids PRN  Induction:  IV  Airway Plan: Video and Direct, Post-Induction  Informed Consent: Informed consent signed with the Patient and all parties understand the risks and agree with anesthesia plan.  All questions answered.   ASA Score: 3  Day of Surgery Review of History & Physical: H&P Update referred to the surgeon/provider.    Ready For Surgery From Anesthesia Perspective.     .

## 2024-08-26 ENCOUNTER — HOSPITAL ENCOUNTER (INPATIENT)
Facility: HOSPITAL | Age: 54
LOS: 1 days | Discharge: HOME | DRG: 621 | End: 2024-08-27
Attending: SURGERY | Admitting: SURGERY
Payer: COMMERCIAL

## 2024-08-26 ENCOUNTER — ANESTHESIA (OUTPATIENT)
Dept: SURGERY | Facility: HOSPITAL | Age: 54
DRG: 621 | End: 2024-08-26
Payer: COMMERCIAL

## 2024-08-26 DIAGNOSIS — F33.42 RECURRENT MAJOR DEPRESSIVE DISORDER, IN FULL REMISSION: Primary | ICD-10-CM

## 2024-08-26 DIAGNOSIS — I10 ESSENTIAL HYPERTENSION: ICD-10-CM

## 2024-08-26 DIAGNOSIS — G47.33 OSA ON CPAP: ICD-10-CM

## 2024-08-26 DIAGNOSIS — R73.03 PREDIABETES: ICD-10-CM

## 2024-08-26 DIAGNOSIS — F41.1 GENERALIZED ANXIETY DISORDER: ICD-10-CM

## 2024-08-26 DIAGNOSIS — E66.01 CLASS 3 SEVERE OBESITY DUE TO EXCESS CALORIES WITH SERIOUS COMORBIDITY AND BODY MASS INDEX (BMI) OF 45.0 TO 49.9 IN ADULT: ICD-10-CM

## 2024-08-26 PROBLEM — E66.9 OBESITY: Status: ACTIVE | Noted: 2024-08-26

## 2024-08-26 PROCEDURE — 94660 CPAP INITIATION&MGMT: CPT

## 2024-08-26 PROCEDURE — 0DB64Z3 EXCISION OF STOMACH, PERCUTANEOUS ENDOSCOPIC APPROACH, VERTICAL: ICD-10-PCS | Performed by: SURGERY

## 2024-08-26 PROCEDURE — 63600175 PHARM REV CODE 636 W HCPCS

## 2024-08-26 PROCEDURE — 88307 TISSUE EXAM BY PATHOLOGIST: CPT | Performed by: STUDENT IN AN ORGANIZED HEALTH CARE EDUCATION/TRAINING PROGRAM

## 2024-08-26 PROCEDURE — 36000710: Performed by: SURGERY

## 2024-08-26 PROCEDURE — 63600175 PHARM REV CODE 636 W HCPCS: Performed by: ANESTHESIOLOGY

## 2024-08-26 PROCEDURE — 94799 UNLISTED PULMONARY SVC/PX: CPT

## 2024-08-26 PROCEDURE — 25000003 PHARM REV CODE 250

## 2024-08-26 PROCEDURE — 37000008 HC ANESTHESIA 1ST 15 MINUTES: Performed by: SURGERY

## 2024-08-26 PROCEDURE — 25000003 PHARM REV CODE 250: Performed by: SURGERY

## 2024-08-26 PROCEDURE — 43775 LAP SLEEVE GASTRECTOMY: CPT | Mod: ,,, | Performed by: SURGERY

## 2024-08-26 PROCEDURE — 71000015 HC POSTOP RECOV 1ST HR: Performed by: SURGERY

## 2024-08-26 PROCEDURE — 0DJ08ZZ INSPECTION OF UPPER INTESTINAL TRACT, VIA NATURAL OR ARTIFICIAL OPENING ENDOSCOPIC: ICD-10-PCS | Performed by: SURGERY

## 2024-08-26 PROCEDURE — 11000001 HC ACUTE MED/SURG PRIVATE ROOM

## 2024-08-26 PROCEDURE — 36000711: Performed by: SURGERY

## 2024-08-26 PROCEDURE — 25000242 PHARM REV CODE 250 ALT 637 W/ HCPCS: Performed by: SURGERY

## 2024-08-26 PROCEDURE — 71000033 HC RECOVERY, INTIAL HOUR: Performed by: SURGERY

## 2024-08-26 PROCEDURE — 27201423 OPTIME MED/SURG SUP & DEVICES STERILE SUPPLY: Performed by: SURGERY

## 2024-08-26 PROCEDURE — D9220A PRA ANESTHESIA: Mod: ,,, | Performed by: ANESTHESIOLOGY

## 2024-08-26 PROCEDURE — 99900035 HC TECH TIME PER 15 MIN (STAT)

## 2024-08-26 PROCEDURE — 63600175 PHARM REV CODE 636 W HCPCS: Performed by: SURGERY

## 2024-08-26 PROCEDURE — 37000009 HC ANESTHESIA EA ADD 15 MINS: Performed by: SURGERY

## 2024-08-26 PROCEDURE — C1781 MESH (IMPLANTABLE): HCPCS | Performed by: SURGERY

## 2024-08-26 PROCEDURE — 94761 N-INVAS EAR/PLS OXIMETRY MLT: CPT

## 2024-08-26 DEVICE — SEAMGUARD ESCHELON 60 MM.: Type: IMPLANTABLE DEVICE | Site: ABDOMEN | Status: FUNCTIONAL

## 2024-08-26 RX ORDER — SODIUM CHLORIDE, SODIUM LACTATE, POTASSIUM CHLORIDE, CALCIUM CHLORIDE 600; 310; 30; 20 MG/100ML; MG/100ML; MG/100ML; MG/100ML
INJECTION, SOLUTION INTRAVENOUS CONTINUOUS
Status: DISCONTINUED | OUTPATIENT
Start: 2024-08-26 | End: 2024-08-27 | Stop reason: HOSPADM

## 2024-08-26 RX ORDER — EPHEDRINE SULFATE 50 MG/ML
INJECTION, SOLUTION INTRAVENOUS
Status: DISCONTINUED | OUTPATIENT
Start: 2024-08-26 | End: 2024-08-26

## 2024-08-26 RX ORDER — ACETAMINOPHEN 650 MG/20.3ML
1000 LIQUID ORAL EVERY 8 HOURS
Status: DISCONTINUED | OUTPATIENT
Start: 2024-08-26 | End: 2024-08-27 | Stop reason: HOSPADM

## 2024-08-26 RX ORDER — PANTOPRAZOLE SODIUM 40 MG/10ML
40 INJECTION, POWDER, LYOPHILIZED, FOR SOLUTION INTRAVENOUS 2 TIMES DAILY
Status: DISCONTINUED | OUTPATIENT
Start: 2024-08-26 | End: 2024-08-27 | Stop reason: HOSPADM

## 2024-08-26 RX ORDER — DEXMEDETOMIDINE HYDROCHLORIDE 100 UG/ML
INJECTION, SOLUTION INTRAVENOUS
Status: DISCONTINUED | OUTPATIENT
Start: 2024-08-26 | End: 2024-08-26

## 2024-08-26 RX ORDER — SUCCINYLCHOLINE CHLORIDE 20 MG/ML
INJECTION INTRAMUSCULAR; INTRAVENOUS
Status: DISCONTINUED | OUTPATIENT
Start: 2024-08-26 | End: 2024-08-26

## 2024-08-26 RX ORDER — SODIUM CHLORIDE 9 MG/ML
INJECTION, SOLUTION INTRAVENOUS CONTINUOUS
Status: DISCONTINUED | OUTPATIENT
Start: 2024-08-26 | End: 2024-08-27 | Stop reason: HOSPADM

## 2024-08-26 RX ORDER — GLUCAGON 1 MG
1 KIT INJECTION
Status: DISCONTINUED | OUTPATIENT
Start: 2024-08-26 | End: 2024-08-26 | Stop reason: HOSPADM

## 2024-08-26 RX ORDER — PROCHLORPERAZINE EDISYLATE 5 MG/ML
5 INJECTION INTRAMUSCULAR; INTRAVENOUS EVERY 6 HOURS PRN
Status: DISCONTINUED | OUTPATIENT
Start: 2024-08-26 | End: 2024-08-27 | Stop reason: HOSPADM

## 2024-08-26 RX ORDER — NOREPINEPHRINE BITARTRATE 1 MG/ML
INJECTION, SOLUTION INTRAVENOUS
Status: DISCONTINUED | OUTPATIENT
Start: 2024-08-26 | End: 2024-08-26

## 2024-08-26 RX ORDER — KETOROLAC TROMETHAMINE 15 MG/ML
15 INJECTION, SOLUTION INTRAMUSCULAR; INTRAVENOUS ONCE
Status: COMPLETED | OUTPATIENT
Start: 2024-08-26 | End: 2024-08-26

## 2024-08-26 RX ORDER — LIDOCAINE HYDROCHLORIDE 20 MG/ML
INJECTION, SOLUTION EPIDURAL; INFILTRATION; INTRACAUDAL; PERINEURAL
Status: DISCONTINUED | OUTPATIENT
Start: 2024-08-26 | End: 2024-08-26

## 2024-08-26 RX ORDER — HYDROMORPHONE HYDROCHLORIDE 1 MG/ML
0.5 INJECTION, SOLUTION INTRAMUSCULAR; INTRAVENOUS; SUBCUTANEOUS ONCE
Status: COMPLETED | OUTPATIENT
Start: 2024-08-26 | End: 2024-08-26

## 2024-08-26 RX ORDER — BUPIVACAINE HYDROCHLORIDE 2.5 MG/ML
INJECTION, SOLUTION EPIDURAL; INFILTRATION; INTRACAUDAL
Status: DISCONTINUED | OUTPATIENT
Start: 2024-08-26 | End: 2024-08-26 | Stop reason: HOSPADM

## 2024-08-26 RX ORDER — ACETAMINOPHEN 10 MG/ML
1000 INJECTION, SOLUTION INTRAVENOUS ONCE
Status: COMPLETED | OUTPATIENT
Start: 2024-08-26 | End: 2024-08-26

## 2024-08-26 RX ORDER — MUPIROCIN 20 MG/G
OINTMENT TOPICAL
Status: DISCONTINUED | OUTPATIENT
Start: 2024-08-26 | End: 2024-08-26 | Stop reason: HOSPADM

## 2024-08-26 RX ORDER — FAMOTIDINE 10 MG/ML
20 INJECTION INTRAVENOUS ONCE
Status: COMPLETED | OUTPATIENT
Start: 2024-08-26 | End: 2024-08-26

## 2024-08-26 RX ORDER — HYDROMORPHONE HYDROCHLORIDE 1 MG/ML
INJECTION, SOLUTION INTRAMUSCULAR; INTRAVENOUS; SUBCUTANEOUS
Status: DISPENSED
Start: 2024-08-26 | End: 2024-08-26

## 2024-08-26 RX ORDER — GABAPENTIN 250 MG/5ML
300 SOLUTION ORAL 2 TIMES DAILY
Status: DISCONTINUED | OUTPATIENT
Start: 2024-08-26 | End: 2024-08-27 | Stop reason: HOSPADM

## 2024-08-26 RX ORDER — HALOPERIDOL 5 MG/ML
0.5 INJECTION INTRAMUSCULAR EVERY 10 MIN PRN
Status: COMPLETED | OUTPATIENT
Start: 2024-08-26 | End: 2024-08-26

## 2024-08-26 RX ORDER — KETAMINE HCL IN 0.9 % NACL 50 MG/5 ML
SYRINGE (ML) INTRAVENOUS
Status: DISCONTINUED | OUTPATIENT
Start: 2024-08-26 | End: 2024-08-26

## 2024-08-26 RX ORDER — MIDAZOLAM HYDROCHLORIDE 1 MG/ML
INJECTION INTRAMUSCULAR; INTRAVENOUS
Status: DISCONTINUED | OUTPATIENT
Start: 2024-08-26 | End: 2024-08-26

## 2024-08-26 RX ORDER — ACETAMINOPHEN 650 MG/20.3ML
500 LIQUID ORAL
Status: DISCONTINUED | OUTPATIENT
Start: 2024-08-26 | End: 2024-08-27 | Stop reason: HOSPADM

## 2024-08-26 RX ORDER — ONDANSETRON HYDROCHLORIDE 2 MG/ML
INJECTION, SOLUTION INTRAVENOUS
Status: DISCONTINUED | OUTPATIENT
Start: 2024-08-26 | End: 2024-08-26

## 2024-08-26 RX ORDER — HEPARIN SODIUM 5000 [USP'U]/ML
5000 INJECTION, SOLUTION INTRAVENOUS; SUBCUTANEOUS ONCE
Status: COMPLETED | OUTPATIENT
Start: 2024-08-26 | End: 2024-08-26

## 2024-08-26 RX ORDER — SCOLOPAMINE TRANSDERMAL SYSTEM 1 MG/1
1 PATCH, EXTENDED RELEASE TRANSDERMAL ONCE
Status: DISCONTINUED | OUTPATIENT
Start: 2024-08-26 | End: 2024-08-27 | Stop reason: HOSPADM

## 2024-08-26 RX ORDER — ROCURONIUM BROMIDE 10 MG/ML
INJECTION, SOLUTION INTRAVENOUS
Status: DISCONTINUED | OUTPATIENT
Start: 2024-08-26 | End: 2024-08-26

## 2024-08-26 RX ORDER — LIDOCAINE HYDROCHLORIDE 10 MG/ML
1 INJECTION, SOLUTION EPIDURAL; INFILTRATION; INTRACAUDAL; PERINEURAL ONCE
Status: DISCONTINUED | OUTPATIENT
Start: 2024-08-26 | End: 2024-08-26 | Stop reason: HOSPADM

## 2024-08-26 RX ORDER — PROPOFOL 10 MG/ML
VIAL (ML) INTRAVENOUS
Status: DISCONTINUED | OUTPATIENT
Start: 2024-08-26 | End: 2024-08-26

## 2024-08-26 RX ORDER — ENOXAPARIN SODIUM 100 MG/ML
40 INJECTION SUBCUTANEOUS EVERY 12 HOURS
Status: DISCONTINUED | OUTPATIENT
Start: 2024-08-26 | End: 2024-08-27 | Stop reason: HOSPADM

## 2024-08-26 RX ORDER — CEFAZOLIN SODIUM 1 G/3ML
INJECTION, POWDER, FOR SOLUTION INTRAMUSCULAR; INTRAVENOUS
Status: DISCONTINUED | OUTPATIENT
Start: 2024-08-26 | End: 2024-08-26

## 2024-08-26 RX ORDER — ONDANSETRON HYDROCHLORIDE 2 MG/ML
8 INJECTION, SOLUTION INTRAVENOUS EVERY 6 HOURS
Status: DISCONTINUED | OUTPATIENT
Start: 2024-08-26 | End: 2024-08-27 | Stop reason: HOSPADM

## 2024-08-26 RX ORDER — DEXAMETHASONE SODIUM PHOSPHATE 4 MG/ML
INJECTION, SOLUTION INTRA-ARTICULAR; INTRALESIONAL; INTRAMUSCULAR; INTRAVENOUS; SOFT TISSUE
Status: DISCONTINUED | OUTPATIENT
Start: 2024-08-26 | End: 2024-08-26

## 2024-08-26 RX ORDER — SODIUM CHLORIDE 0.9 % (FLUSH) 0.9 %
10 SYRINGE (ML) INJECTION
Status: DISCONTINUED | OUTPATIENT
Start: 2024-08-26 | End: 2024-08-26 | Stop reason: HOSPADM

## 2024-08-26 RX ORDER — OXYCODONE HCL 5 MG/5 ML
5 SOLUTION, ORAL ORAL EVERY 6 HOURS PRN
Status: DISCONTINUED | OUTPATIENT
Start: 2024-08-26 | End: 2024-08-27 | Stop reason: HOSPADM

## 2024-08-26 RX ADMIN — ROCURONIUM BROMIDE 30 MG: 10 INJECTION, SOLUTION INTRAVENOUS at 08:08

## 2024-08-26 RX ADMIN — EPHEDRINE SULFATE 5 MG: 50 INJECTION INTRAVENOUS at 07:08

## 2024-08-26 RX ADMIN — HYDROMORPHONE HYDROCHLORIDE 0.5 MG: 1 INJECTION, SOLUTION INTRAMUSCULAR; INTRAVENOUS; SUBCUTANEOUS at 10:08

## 2024-08-26 RX ADMIN — AMLODIPINE 5 MG: 1 SUSPENSION ORAL at 10:08

## 2024-08-26 RX ADMIN — Medication 10 MG: at 08:08

## 2024-08-26 RX ADMIN — MUPIROCIN: 20 OINTMENT TOPICAL at 06:08

## 2024-08-26 RX ADMIN — ACETAMINOPHEN 1000 MG: 10 INJECTION, SOLUTION INTRAVENOUS at 06:08

## 2024-08-26 RX ADMIN — NOREPINEPHRINE BITARTRATE 150 MCG: 1 INJECTION, SOLUTION, CONCENTRATE INTRAVENOUS at 07:08

## 2024-08-26 RX ADMIN — CAROSPIR 50 MG: 25 SUSPENSION ORAL at 10:08

## 2024-08-26 RX ADMIN — ACETAMINOPHEN 999.01 MG: 650 SOLUTION ORAL at 10:08

## 2024-08-26 RX ADMIN — EPHEDRINE SULFATE 10 MG: 50 INJECTION INTRAVENOUS at 07:08

## 2024-08-26 RX ADMIN — CEFAZOLIN 3 G: 330 INJECTION, POWDER, FOR SOLUTION INTRAMUSCULAR; INTRAVENOUS at 07:08

## 2024-08-26 RX ADMIN — SODIUM CHLORIDE: 0.9 INJECTION, SOLUTION INTRAVENOUS at 08:08

## 2024-08-26 RX ADMIN — SODIUM CHLORIDE, SODIUM GLUCONATE, SODIUM ACETATE, POTASSIUM CHLORIDE, MAGNESIUM CHLORIDE, SODIUM PHOSPHATE, DIBASIC, AND POTASSIUM PHOSPHATE: .53; .5; .37; .037; .03; .012; .00082 INJECTION, SOLUTION INTRAVENOUS at 07:08

## 2024-08-26 RX ADMIN — SODIUM CHLORIDE, POTASSIUM CHLORIDE, SODIUM LACTATE AND CALCIUM CHLORIDE: 600; 310; 30; 20 INJECTION, SOLUTION INTRAVENOUS at 05:08

## 2024-08-26 RX ADMIN — HEPARIN SODIUM 5000 UNITS: 5000 INJECTION INTRAVENOUS; SUBCUTANEOUS at 06:08

## 2024-08-26 RX ADMIN — ONDANSETRON 8 MG: 2 INJECTION INTRAMUSCULAR; INTRAVENOUS at 05:08

## 2024-08-26 RX ADMIN — PROPOFOL 170 MG: 10 INJECTION, EMULSION INTRAVENOUS at 07:08

## 2024-08-26 RX ADMIN — KETOROLAC TROMETHAMINE 15 MG: 15 INJECTION, SOLUTION INTRAMUSCULAR; INTRAVENOUS at 06:08

## 2024-08-26 RX ADMIN — DEXMEDETOMIDINE 8 MCG: 100 INJECTION, SOLUTION, CONCENTRATE INTRAVENOUS at 08:08

## 2024-08-26 RX ADMIN — OXYCODONE HYDROCHLORIDE 5 MG: 5 SOLUTION ORAL at 12:08

## 2024-08-26 RX ADMIN — ONDANSETRON 4 MG: 2 INJECTION INTRAMUSCULAR; INTRAVENOUS at 08:08

## 2024-08-26 RX ADMIN — ENOXAPARIN SODIUM 40 MG: 40 INJECTION SUBCUTANEOUS at 10:08

## 2024-08-26 RX ADMIN — SODIUM CHLORIDE, POTASSIUM CHLORIDE, SODIUM LACTATE AND CALCIUM CHLORIDE: 600; 310; 30; 20 INJECTION, SOLUTION INTRAVENOUS at 09:08

## 2024-08-26 RX ADMIN — SODIUM CHLORIDE: 0.9 INJECTION, SOLUTION INTRAVENOUS at 07:08

## 2024-08-26 RX ADMIN — SUCCINYLCHOLINE 140 MG: 20 INJECTION, SOLUTION INTRAMUSCULAR; INTRAVENOUS at 07:08

## 2024-08-26 RX ADMIN — ONDANSETRON 8 MG: 2 INJECTION INTRAMUSCULAR; INTRAVENOUS at 12:08

## 2024-08-26 RX ADMIN — FAMOTIDINE 20 MG: 10 INJECTION, SOLUTION INTRAVENOUS at 06:08

## 2024-08-26 RX ADMIN — PROCHLORPERAZINE EDISYLATE 5 MG: 5 INJECTION INTRAMUSCULAR; INTRAVENOUS at 01:08

## 2024-08-26 RX ADMIN — PANTOPRAZOLE SODIUM 40 MG: 40 INJECTION, POWDER, LYOPHILIZED, FOR SOLUTION INTRAVENOUS at 09:08

## 2024-08-26 RX ADMIN — PROCHLORPERAZINE EDISYLATE 5 MG: 5 INJECTION INTRAMUSCULAR; INTRAVENOUS at 08:08

## 2024-08-26 RX ADMIN — MIDAZOLAM HYDROCHLORIDE 2 MG: 2 INJECTION, SOLUTION INTRAMUSCULAR; INTRAVENOUS at 07:08

## 2024-08-26 RX ADMIN — Medication 20 MG: at 07:08

## 2024-08-26 RX ADMIN — SUGAMMADEX 600 MG: 100 INJECTION, SOLUTION INTRAVENOUS at 08:08

## 2024-08-26 RX ADMIN — DEXAMETHASONE SODIUM PHOSPHATE 8 MG: 4 INJECTION, SOLUTION INTRAMUSCULAR; INTRAVENOUS at 07:08

## 2024-08-26 RX ADMIN — METHOCARBAMOL 500 MG: 100 INJECTION INTRAMUSCULAR; INTRAVENOUS at 10:08

## 2024-08-26 RX ADMIN — ROCURONIUM BROMIDE 10 MG: 10 INJECTION, SOLUTION INTRAVENOUS at 08:08

## 2024-08-26 RX ADMIN — ENOXAPARIN SODIUM 40 MG: 40 INJECTION SUBCUTANEOUS at 12:08

## 2024-08-26 RX ADMIN — HALOPERIDOL LACTATE 0.5 MG: 5 INJECTION, SOLUTION INTRAMUSCULAR at 09:08

## 2024-08-26 RX ADMIN — ROCURONIUM BROMIDE 30 MG: 10 INJECTION, SOLUTION INTRAVENOUS at 07:08

## 2024-08-26 RX ADMIN — SIMETHICONE 40 MG: 20 SUSPENSION/ DROPS ORAL at 10:08

## 2024-08-26 RX ADMIN — LIDOCAINE HYDROCHLORIDE 80 MG: 20 INJECTION, SOLUTION EPIDURAL; INFILTRATION; INTRACAUDAL; PERINEURAL at 07:08

## 2024-08-26 RX ADMIN — SCOPALAMINE 1 PATCH: 1 PATCH, EXTENDED RELEASE TRANSDERMAL at 06:08

## 2024-08-26 RX ADMIN — PANTOPRAZOLE SODIUM 40 MG: 40 INJECTION, POWDER, LYOPHILIZED, FOR SOLUTION INTRAVENOUS at 10:08

## 2024-08-26 RX ADMIN — GABAPENTIN 300 MG: 250 SOLUTION ORAL at 10:08

## 2024-08-26 NOTE — ANESTHESIA POSTPROCEDURE EVALUATION
Anesthesia Post Evaluation    Patient: Criss Link    Procedure(s) Performed: Procedure(s) (LRB):  GASTRECTOMY, SLEEVE, LAPAROSCOPIC with intraop EGD (N/A)  BLOCK, TRANSVERSUS ABDOMINIS PLANE (Bilateral)    Final Anesthesia Type: general      Patient location during evaluation: PACU  Patient participation: Yes- Able to Participate  Level of consciousness: awake and alert and oriented  Post-procedure vital signs: reviewed and stable  Pain management: adequate  Airway patency: patent    PONV status at discharge: No PONV  Anesthetic complications: no      Cardiovascular status: blood pressure returned to baseline and hemodynamically stable  Respiratory status: unassisted, spontaneous ventilation and room air  Hydration status: euvolemic  Follow-up not needed.              Vitals Value Taken Time   /63 08/26/24 1115   Temp 36.6 °C (97.9 °F) 08/26/24 1115   Pulse 76 08/26/24 1115   Resp 18 08/26/24 1227   SpO2 100 % 08/26/24 1226         Event Time   Out of Recovery 10:13:00         Pain/Alix Score: Pain Rating Prior to Med Admin: 7 (8/26/2024 12:27 PM)  Pain Rating Post Med Admin: 6 (8/26/2024  1:27 PM)  Alix Score: 9 (8/26/2024 10:30 AM)

## 2024-08-26 NOTE — PROGRESS NOTES
Pt spit up bright red blood into emesis bag. Bag left in room by patient's sink for MD to assess. Dr. Bundy made aware at nurses station, spoke with patient.

## 2024-08-26 NOTE — NURSING TRANSFER
Pt remained stable during pacu stay. VSS. Patient pain was very uncomfortable and due to bad nausea after surgery was unable to take any by mouth. Notified Anesthesia and was discuss with surgery team ok to given Dilaudid 0.5 mg x1. Pt pain was relieved with that and pt also received Haldol 0.5 mg IV X2 and nausea was slightly relieved but pt did not want to take any pain po medication by mouth. Incision to abdomen intact X5 sites with dermabond CDI. Teds/SCD's in place throughout duration in PACU.

## 2024-08-26 NOTE — BRIEF OP NOTE
[facility] - Surgery ([facility])  Brief Operative Note    SUMMARY     Surgery Date: 8/26/2024     Surgeons and Role:     * Edyta Hernandez MD - Primary     * Wyatt Bundy MD - Resident - Assisting    Pre-op Diagnosis:  Class 3 obesity [E66.01]  BMI 45.0-49.9, adult [Z68.42]  Essential hypertension [I10]  CATE (obstructive sleep apnea) [G47.33]  Prediabetes [R73.03]    Post-op Diagnosis:  Post-Op Diagnosis Codes:     * Class 3 obesity [E66.01]     * BMI 45.0-49.9, adult [Z68.42]     * Essential hypertension [I10]     * CATE (obstructive sleep apnea) [G47.33]     * Prediabetes [R73.03]    Procedure(s) (LRB):  GASTRECTOMY, SLEEVE, LAPAROSCOPIC with intraop EGD (N/A)  BLOCK, TRANSVERSUS ABDOMINIS PLANE (Bilateral)    Anesthesia: General    Implants:  Implant Name Type Inv. Item Serial No.  Lot No. LRB No. Used Action   SEAMGUARD ESCHELON 60 MM. - [id number]  SEAMGUARD ESCHELON 60 MM.  [employer] [id number] N/A 1 Implanted   SEAMGUARD ESCHELON 60 MM. - [id number]  SEAMGUARD ESCHELON 60 MM.  [employer] [id number] N/A 1 Implanted   SEAMGUARD ESCHELON 60 MM. - [id number]  SEAMGUARD ESCHELON 60 MM.  [employer] [id number] N/A 1 Implanted   SEAMGUARD ESCHELON 60 MM. - [id number]  SEAMGUARD ESCHELON 60 MM.  [employer] [id number] N/A 1 Implanted       Operative Findings:   Lap gastric sleeve without apparent complications.    Estimated Blood Loss: Minimal    Estimated Blood Loss has been documented.         Specimens:   Specimen (24h ago, onward)       Start     Ordered    08/26/24 0904  Specimen to Pathology, Surgery General Surgery  Once        Comments: Pre-op Diagnosis: Class 3 obesity [E66.01]BMI 45.0-49.9, adult [Z68.42]Essential hypertension [I10]CATE (obstructive sleep apnea) [G47.33]Prediabetes [R73.03]Procedure(s):GASTRECTOMY, SLEEVE, LAPAROSCOPIC with intraop EGD Number of specimens: 1Name of specimens: 1.STOMACH-PERM     References:    Click here for ordering Quick Tip   Question Answer Comment   Procedure Type:  General Surgery    Specimen Class: Routine/Screening    Release to patient Immediate        08/26/24 0903                    [id number]

## 2024-08-26 NOTE — PROGRESS NOTES
..Nursing Transfer Note      Reason patient is being transferred: procedure care complete    Transfer To: [id number]    Transfer via bed    Transfer with n/a    Transported by PCT     Medicines sent: LR @ 125 ml/hr    Any special needs or follow-up needed: routine    Chart send with patient: Yes    Notified: daughter, son    Patient reassessed at: 8/26/24 1030 (date, time)

## 2024-08-26 NOTE — ANESTHESIA PROCEDURE NOTES
Intubation    Date/Time: 8/26/2024 7:20 AM    Performed by: Marko Wilson MD  Authorized by: Lavinia Zavala MD    Intubation:     Induction:  Rapid sequence induction    Intubated:  Postinduction    Mask Ventilation:  Not attempted    Attempts:  1    Attempted By:  Resident anesthesiologist    Method of Intubation:  Video laryngoscopy    Blade:  Lemus 3    Laryngeal View Grade: Grade I - full view of cords      Difficult Airway Encountered?: No      Complications:  None    Airway Device:  Oral endotracheal tube    Airway Device Size:  7.0    Style/Cuff Inflation:  Cuffed (inflated to minimal occlusive pressure)    Tube secured:  23    Secured at:  The teeth    Placement Verified By:  Capnometry    Complicating Factors:  None    Findings Post-Intubation:  BS equal bilateral and atraumatic/condition of teeth unchanged

## 2024-08-26 NOTE — PROGRESS NOTES
Nurses Note -- 4 Eyes      8/26/2024   12:23 PM      Skin assessed during: Admit      [x] No Altered Skin Integrity Present    []Prevention Measures Documented      [] Yes- Altered Skin Integrity Present or Discovered   [] LDA Added if Not in Epic (Describe Wound)   [] New Altered Skin Integrity was Present on Admit and Documented in LDA   [] Wound Image Taken    Wound Care Consulted? No    Attending Nurse:  Consuelo Almodovar RN/Staff Member: Clotilde

## 2024-08-26 NOTE — OP NOTE
DATE OF PROCEDURE: 8/26/2024    PRE OP DIAGNOSIS: Class 3 obesity [E66.01]  BMI 45.0-49.9, adult [Z68.42]  Essential hypertension [I10]  CATE (obstructive sleep apnea) [G47.33]  Prediabetes [R73.03]  Depression and anxiety  Chronic back pain    POST OP DIAGNOSIS: Class 3 obesity [E66.01]  BMI 45.0-49.9, adult [Z68.42]  Essential hypertension [I10]  CATE (obstructive sleep apnea) [G47.33]  Prediabetes [R73.03]  Depression and anxiety  Chronic back pain    PROCEDURE: Procedure(s) (LRB):  GASTRECTOMY, SLEEVE, LAPAROSCOPIC with intraop EGD (N/A)  BLOCK, TRANSVERSUS ABDOMINIS PLANE (Bilateral)    Surgeons and Role:     * Edyta Hernandez MD - Primary     * Wyatt Bundy MD - Resident - Assisting    ANESTHESIA: General    INDICATION: Patient is a 54 year-old morbidly obese female with BMI 48.63 kg/m² and multiple associated comorbidities including HTN, CATE, CBP, MDD/NIKOLAY, and prediabetes. She meets NIH consensus criteria for bariatric surgery and completed the OM program. After discussing the risks, benefits and alternatives to bariatric surgery, she elected to proceed with laparoscopic sleeve gastrectomy. Informed consent was obtained.     FINDINGS:  Sleeve gastrectomy performed over 40Fr Bougie     DESCRIPTION OF OPERATION: The patient was met in the pre-op area and her identity and consent confirmed. She was then brought back to the Operating Room and placed in the supine position. General anesthesia was induced and she was intubated without complication. SCDs and a warming blanket were placed and pre-operative antibiotics were infused within 30 minutes of the incision. The abdomen was prepped and draped in sterile fashion and a pre-procedural pause was performed by all members of the surgical and anesthesia teams. Access to peritoneum was gained 15 cm below the xiphoid to the left of midline using an 11-mm Optiview trocar under direct vision. Pneumoperitoneum to 15 mmHg with CO2 gas was obtained. Inspection  yielded no injury. Bilateral transversus abdominal plane blocks were performed under direct vision instilling 10 mL 0.25% bupivacaine on each side. Three 5-mm trocars were placed, laterally subcostal on each side and midclavicular subcostal on the right, and a 12-mm trocar placed 1 handbreadth to the right of the camera port. The liver retractor was placed via the right lateral trocar. The greater curve was taken down starting 6 cm from the pylorus going all the way to the base of the left niki taking all posterior gastric attachments with the Harmonic scalpel. A 40-Bhut[place] bougie was passed towards the pylorus and the stomach was resected along the bougie starting 6 cm from the pylorus and coming out just a little bit at the angle of His. This was done using one green staple cartridge at the antrum and the remainder blue staples, all reinforced with Seam Guard. The bougie was removed. Endoscopy was performed. The sleeve appeared appropriate in size and configuration and there were no air leaks seen. The air was aspirated from the sleeve and the endoscope was withdrawn. The abdomen was inspected and hemostasis ensured. The gastrectomy specimen was removed through the 12-mm trocar site which was then closed with 0-Vicryl using a Michael Michael device and irrigated. The liver retractor was removed. The remaining trocars were removed under direct vision. Prior to removing the last trocar, the pneumoperitoneum was allowed to escape. The skin incisions were closed with 4-0 Monocryl, and reinforced with Dermabond. The patient was awoken from anesthesia and extubated. She was brought to the PACU in good condition having tolerated the operation well. Surgical sponge and needle counts were correct at the end of the case.     EBL: 5 mL  Specimen: Portion of stomach  Drains: None  Complications: None apparent  Dispo: Surgical floor     I was present and scrubbed for the entirety of this operation.      Edyta Melton  Ralph  8/26/2024

## 2024-08-26 NOTE — NURSING TRANSFER
Nursing Transfer Note      8/26/2024   9:31 AM    Nurse giving handoff:Gary MAE Rn  Nurse receiving handoff:    Reason patient is being transferred: postop    Transfer To: [id number]    Transfer via bed    Transfer with n/a    Transported by pacu transport    Transfer Vital Signs:  Blood Pressure:see flowsheet  Heart Rate:  O2:  Temperature:  Respirations:    Telemetry: n/a  Order for Tele Monitor? N/a    Additional Lines: n/a      Medicines sent: LR    Any special needs or follow-up needed: n/a    Patient belongings transferred with patient:  n/a    Chart send with patient: Yes    Notified:     Patient reassessed at: 8/26/24  1  Upon arrival to floor: bed in lowest position

## 2024-08-26 NOTE — PROGRESS NOTES
Patient oriented to new room. VSS. IV fluids infusing. Incision site CDI. Pain controlled, having some nausea. IS at bedside and teaching completed. SCDs on. Will admit pt and continue to monitor.

## 2024-08-27 VITALS
HEART RATE: 72 BPM | SYSTOLIC BLOOD PRESSURE: 136 MMHG | TEMPERATURE: 99 F | RESPIRATION RATE: 17 BRPM | OXYGEN SATURATION: 97 % | WEIGHT: 285.69 LBS | HEIGHT: 64 IN | BODY MASS INDEX: 48.77 KG/M2 | DIASTOLIC BLOOD PRESSURE: 66 MMHG

## 2024-08-27 LAB
ANION GAP SERPL CALC-SCNC: 12 MMOL/L (ref 8–16)
BASOPHILS # BLD AUTO: 0.01 K/UL (ref 0–0.2)
BASOPHILS NFR BLD: 0.1 % (ref 0–1.9)
BUN SERPL-MCNC: 7 MG/DL (ref 6–20)
CALCIUM SERPL-MCNC: 9.5 MG/DL (ref 8.7–10.5)
CHLORIDE SERPL-SCNC: 97 MMOL/L (ref 95–110)
CO2 SERPL-SCNC: 24 MMOL/L (ref 23–29)
CREAT SERPL-MCNC: 0.8 MG/DL (ref 0.5–1.4)
DIFFERENTIAL METHOD BLD: ABNORMAL
EOSINOPHIL # BLD AUTO: 0 K/UL (ref 0–0.5)
EOSINOPHIL NFR BLD: 0 % (ref 0–8)
ERYTHROCYTE [DISTWIDTH] IN BLOOD BY AUTOMATED COUNT: 12.7 % (ref 11.5–14.5)
EST. GFR  (NO RACE VARIABLE): >60 ML/MIN/1.73 M^2
GLUCOSE SERPL-MCNC: 89 MG/DL (ref 70–110)
HCT VFR BLD AUTO: 40.7 % (ref 37–48.5)
HGB BLD-MCNC: 13.5 G/DL (ref 12–16)
IMM GRANULOCYTES # BLD AUTO: 0.06 K/UL (ref 0–0.04)
IMM GRANULOCYTES NFR BLD AUTO: 0.5 % (ref 0–0.5)
LYMPHOCYTES # BLD AUTO: 1.1 K/UL (ref 1–4.8)
LYMPHOCYTES NFR BLD: 9.2 % (ref 18–48)
MAGNESIUM SERPL-MCNC: 1.8 MG/DL (ref 1.6–2.6)
MCH RBC QN AUTO: 29.7 PG (ref 27–31)
MCHC RBC AUTO-ENTMCNC: 33.2 G/DL (ref 32–36)
MCV RBC AUTO: 90 FL (ref 82–98)
MONOCYTES # BLD AUTO: 1 K/UL (ref 0.3–1)
MONOCYTES NFR BLD: 8.8 % (ref 4–15)
NEUTROPHILS # BLD AUTO: 9.6 K/UL (ref 1.8–7.7)
NEUTROPHILS NFR BLD: 81.4 % (ref 38–73)
NRBC BLD-RTO: 0 /100 WBC
PHOSPHATE SERPL-MCNC: 2.4 MG/DL (ref 2.7–4.5)
PLATELET # BLD AUTO: 400 K/UL (ref 150–450)
PMV BLD AUTO: 10.9 FL (ref 9.2–12.9)
POTASSIUM SERPL-SCNC: 3.7 MMOL/L (ref 3.5–5.1)
RBC # BLD AUTO: 4.55 M/UL (ref 4–5.4)
SODIUM SERPL-SCNC: 133 MMOL/L (ref 136–145)
WBC # BLD AUTO: 11.8 K/UL (ref 3.9–12.7)

## 2024-08-27 PROCEDURE — 94761 N-INVAS EAR/PLS OXIMETRY MLT: CPT

## 2024-08-27 PROCEDURE — 84100 ASSAY OF PHOSPHORUS: CPT | Performed by: SURGERY

## 2024-08-27 PROCEDURE — 80048 BASIC METABOLIC PNL TOTAL CA: CPT | Performed by: SURGERY

## 2024-08-27 PROCEDURE — 63600175 PHARM REV CODE 636 W HCPCS: Performed by: SURGERY

## 2024-08-27 PROCEDURE — 25000003 PHARM REV CODE 250: Performed by: SURGERY

## 2024-08-27 PROCEDURE — 99900035 HC TECH TIME PER 15 MIN (STAT)

## 2024-08-27 PROCEDURE — 85025 COMPLETE CBC W/AUTO DIFF WBC: CPT | Performed by: SURGERY

## 2024-08-27 PROCEDURE — 83735 ASSAY OF MAGNESIUM: CPT | Performed by: SURGERY

## 2024-08-27 PROCEDURE — 36415 COLL VENOUS BLD VENIPUNCTURE: CPT | Performed by: SURGERY

## 2024-08-27 PROCEDURE — 63600175 PHARM REV CODE 636 W HCPCS

## 2024-08-27 PROCEDURE — 25000003 PHARM REV CODE 250

## 2024-08-27 RX ORDER — SERTRALINE HYDROCHLORIDE 20 MG/ML
100 SOLUTION ORAL DAILY
Qty: 120 ML | Refills: 0 | Status: SHIPPED | OUTPATIENT
Start: 2024-08-27 | End: 2024-09-10

## 2024-08-27 RX ADMIN — GABAPENTIN 300 MG: 250 SOLUTION ORAL at 10:08

## 2024-08-27 RX ADMIN — PANTOPRAZOLE SODIUM 40 MG: 40 INJECTION, POWDER, LYOPHILIZED, FOR SOLUTION INTRAVENOUS at 10:08

## 2024-08-27 RX ADMIN — METHOCARBAMOL 500 MG: 100 INJECTION INTRAMUSCULAR; INTRAVENOUS at 05:08

## 2024-08-27 RX ADMIN — ACETAMINOPHEN 999.01 MG: 650 SOLUTION ORAL at 05:08

## 2024-08-27 RX ADMIN — ONDANSETRON 8 MG: 2 INJECTION INTRAMUSCULAR; INTRAVENOUS at 12:08

## 2024-08-27 RX ADMIN — POTASSIUM PHOSPHATE, MONOBASIC AND POTASSIUM PHOSPHATE, DIBASIC 15 MMOL: 224; 236 INJECTION, SOLUTION, CONCENTRATE INTRAVENOUS at 10:08

## 2024-08-27 RX ADMIN — ONDANSETRON 8 MG: 2 INJECTION INTRAMUSCULAR; INTRAVENOUS at 05:08

## 2024-08-27 RX ADMIN — ENOXAPARIN SODIUM 40 MG: 40 INJECTION SUBCUTANEOUS at 10:08

## 2024-08-27 NOTE — PLAN OF CARE
[facility] - Surgery  Initial Discharge Assessment       Primary Care Provider: Leanna Galeano DO    Admission Diagnosis: Class 3 obesity [E66.01]  BMI 45.0-49.9, adult [Z68.42]  Essential hypertension [I10]  CATE (obstructive sleep apnea) [G47.33]  Prediabetes [R73.03]  Class 3 severe obesity due to excess calories with serious comorbidity and body mass index (BMI) of 45.0 to 49.9 in adult [E66.01, Z68.42]  Obesity [E66.9]    Admission Date: 8/26/2024  Expected Discharge Date:     Transition of Care Barriers: None    Payor: CIGNA / Plan: C[employer] OPEN ACCESS PLUS / Product Type: Commercial /     Extended Emergency Contact Information  Primary Emergency Contact: Steven David  Address: [address] [place] [address]  Mobile Phone: [phone]  Relation: Son  Secondary Emergency Contact: kentrell benjamin  Address: [address] [place] [address]  Home Phone: [phone]  Mobile Phone: [phone]  Relation: Mother   needed? No  Father: FaribaManuel pearson  Address: [address] [place] [address]  Home Phone: [phone]  Mobile Phone: [phone]    Discharge Plan A: Home with family  Discharge Plan B: Home with family, Home Health      [id number] [place] - [address]  [address]  [place] [address]  Phone: [phone] Fax: [phone]    [id number] - [place] - [address]  [address]  [place] [address]  Phone: [phone] Fax: [phone]    [id number] [place] - [address]  [address]  [place] [address]  Phone: [phone] Fax: [phone]      Initial Assessment (most recent)       Adult Discharge Assessment - 08/27/24 0834          Discharge Assessment    Assessment Type Discharge Planning Assessment     Confirmed/corrected address, phone number and insurance Yes     Confirmed Demographics Correct on  Facesheet     Source of Information patient     Communicated KAVON with patient/caregiver Yes     People in Home child(trsitan), dependent;child(tristan), adult     Do you expect to return to your current living situation? Yes     Do you have help at home or someone to help you manage your care at home? Yes     Who are your caregiver(s) and their phone number(s)? Adrianne- # [phone]     Prior to hospitilization cognitive status: Alert/Oriented     Current cognitive status: Alert/Oriented     Walking or Climbing Stairs Difficulty no     Dressing/Bathing Difficulty no     Home Accessibility wheelchair accessible     Home Layout Bathroom on 2nd floor;Bedroom on 2nd floor     Equipment Currently Used at Home CPAP     Patient currently being followed by outpatient case management? No     Do you currently have service(s) that help you manage your care at home? No     Do you take prescription medications? Yes     Do you have prescription coverage? Yes     Do you have any problems affording any of your prescribed medications? No     Is the patient taking medications as prescribed? yes     How do you get to doctors appointments? car, drives self;family or friend will provide     Are you on dialysis? No     Do you take coumadin? No     Discharge Plan A Home with family     Discharge Plan B Home with family;Home Health     DME Needed Upon Discharge  none     Discharge Plan discussed with: Patient     Transition of Care Barriers None                      SW completed discharge planning assessment with the patient at bedside. SW verified demographic information listed on the pt.'s Face sheet.Pt reports living with her two sons and mother in a two story home, her rooms located on the 2nd floor. Pt plans for her mother (Adrianne) to help manage her care upon discharge and provide transportation.SW is following this Pt for DC planning needs. There are no identified needs at this time.    Discharge Plan A and Plan B have been determined by  review of patient's clinical status, future medical and therapeutic needs, and coverage/benefits for post-acute care in coordination with multidisciplinary team members.      Shanae Coon LMSW  Case Management   [facility]   Ext. [phone]

## 2024-08-27 NOTE — PROGRESS NOTES
[facility] - Surgery  General Surgery  Progress Note    Subjective:     History of Present Illness:  No notes on file    Post-Op Info:  Procedure(s) (LRB):  GASTRECTOMY, SLEEVE, LAPAROSCOPIC with intraop EGD (N/A)  BLOCK, TRANSVERSUS ABDOMINIS PLANE (Bilateral)   1 Day Post-Op     Interval History: NAEON. Patient had small volume bright red emesis postop yesterday but none since. She states she is doing well this morning. Started water protocol last night and tolerating well. Pain and nausea under control with oral meds. Ambulated last night and this morning. Vitals normal. Labs normal except for low phosph.    Medications:  Continuous Infusions:   0.9% NaCl   Intravenous Continuous        lactated ringers   Intravenous Continuous 125 mL/hr at 08/26/24 1751 New Bag at 08/26/24 1751     Scheduled Meds:   acetaminophen  999.0148 mg Oral Q8H    amLODIPine benzoate  5 mg Oral QHS    enoxparin  40 mg Subcutaneous Q12H    gabapentin  300 mg Oral BID    methocarbamol (ROBAXIN) IVPB  500 mg Intravenous Q8H    ondansetron  8 mg Intravenous Q6H    pantoprazole  40 mg Intravenous BID    potassium phosphate IVPB  15 mmol Intravenous Once    scopolamine  1 patch Transdermal Once    spironolactone  50 mg Oral QHS     PRN Meds:  Current Facility-Administered Medications:     acetaminophen, 499.5074 mg, Oral, Q24H PRN    dextrose 10%, 12.5 g, Intravenous, PRN    dextrose 10%, 25 g, Intravenous, PRN    oxyCODONE, 5 mg, Oral, Q6H PRN    prochlorperazine, 5 mg, Intravenous, Q6H PRN    simethicone, 40 mg, Oral, QID PRN     Review of patient's allergies indicates:  No Known Allergies  Objective:     Vital Signs (Most Recent):  Temp: 98.6 °F (37 °C) (08/27/24 0441)  Pulse: 74 (08/27/24 0441)  Resp: 16 (08/27/24 0441)  BP: (!) 141/64 (08/27/24 0441)  SpO2: 98 % (08/27/24 0441) Vital Signs (24h Range):  Temp:  [96 °F (35.6 °C)-99.4 °F (37.4 °C)] 98.6 °F (37 °C)  Pulse:  [71-78] 74  Resp:  [10-25] 16  SpO2:  [94 %-100 %] 98 %  BP:  (130-167)/(63-80) 141/64     Weight: 129.6 kg (285 lb 11.5 oz)  Body mass index is 49.04 kg/m².    Intake/Output - Last 3 Shifts         08/25 0700 08/26 0659 08/26 0700 08/27 0659 08/27 0700 08/28 0659    P.O.  45     I.V. (mL/kg)  1087.6 (8.4)     IV Piggyback  1600     Total Intake(mL/kg)  2732.6 (21.1)     Net  +2732.6            Urine Occurrence  3 x              Physical Exam  Vitals reviewed.   Constitutional:       Appearance: She is obese.   Cardiovascular:      Rate and Rhythm: Normal rate and regular rhythm.      Pulses: Normal pulses.   Pulmonary:      Effort: Pulmonary effort is normal.   Abdominal:      General: Abdomen is flat.      Palpations: Abdomen is soft.      Comments: Incisions CDI  Appropriately tender, soft, non-distended   Skin:     General: Skin is warm.      Capillary Refill: Capillary refill takes less than 2 seconds.   Neurological:      General: No focal deficit present.      Mental Status: She is alert.          Significant Labs:  I have reviewed all pertinent lab results within the past 24 hours.  CBC:   Recent Labs   Lab 08/27/24  0336   WBC 11.80   RBC 4.55   HGB 13.5   HCT 40.7      MCV 90   MCH 29.7   MCHC 33.2     CMP:   Recent Labs   Lab 08/27/24  0336   GLU 89   CALCIUM 9.5   *   K 3.7   CO2 24   CL 97   BUN 7   CREATININE 0.8       Significant Diagnostics:  I have reviewed all pertinent imaging results/findings within the past 24 hours.  Assessment/Plan:     * Obesity  Patient is S/P lap sleeve gastrectomy with intra-op EGD on 8/26. Doing well.    - Continue water protocol this morning  - Transition to CLD as tolerated  - PO nausea/pain meds  - mIVF until taking enough PO   - Daily labs with electrolyte replacement  - DVT ppx  - Restart home meds as appropriate   - OOB    Dispo: Likely this afternoon pending PO tolerance         Wyatt Bundy MD  General Surgery  [facility] - Surgery

## 2024-08-27 NOTE — ASSESSMENT & PLAN NOTE
Patient is S/P lap sleeve gastrectomy with intra-op EGD on 8/26. Doing well.    - Continue water protocol this morning  - Transition to CLD as tolerated  - PO nausea/pain meds  - mIVF until taking enough PO   - Daily labs with electrolyte replacement  - DVT ppx  - Restart home meds as appropriate   - OOB    Dispo: Likely this afternoon pending PO tolerance

## 2024-08-27 NOTE — NURSING
Nurses Note -- 4 Eyes      8/27/2024   2:11 AM      Skin assessed during: Q Shift Change      [x] No Altered Skin Integrity Present    []Prevention Measures Documented      [] Yes- Altered Skin Integrity Present or Discovered   [] LDA Added if Not in Epic (Describe Wound)   [] New Altered Skin Integrity was Present on Admit and Documented in LDA   [] Wound Image Taken    Wound Care Consulted? No    Attending Nurse:  TWAN Rosas     Second RN/Staff Member:  TWAN Cordero        '

## 2024-08-27 NOTE — NURSING
Pt  VS stable. Pt surgical sites x5 lap sites C/D/I with dermabond. Pt ambulating in the hallways and room. Pt N/V x2 when taking meds; brown emesis. MD made aware; no new orders. PRN compazine given. Call light within reach. Will continue plan of care

## 2024-08-27 NOTE — SUBJECTIVE & OBJECTIVE
Interval History: NAEON. Patient had small volume bright red emesis postop yesterday but none since. She states she is doing well this morning. Started water protocol last night and tolerating well. Pain and nausea under control with oral meds. Ambulated last night and this morning. Vitals normal. Labs normal except for low phosph.    Medications:  Continuous Infusions:   0.9% NaCl   Intravenous Continuous        lactated ringers   Intravenous Continuous 125 mL/hr at 08/26/24 1751 New Bag at 08/26/24 1751     Scheduled Meds:   acetaminophen  999.0148 mg Oral Q8H    amLODIPine benzoate  5 mg Oral QHS    enoxparin  40 mg Subcutaneous Q12H    gabapentin  300 mg Oral BID    methocarbamol (ROBAXIN) IVPB  500 mg Intravenous Q8H    ondansetron  8 mg Intravenous Q6H    pantoprazole  40 mg Intravenous BID    potassium phosphate IVPB  15 mmol Intravenous Once    scopolamine  1 patch Transdermal Once    spironolactone  50 mg Oral QHS     PRN Meds:  Current Facility-Administered Medications:     acetaminophen, 499.5074 mg, Oral, Q24H PRN    dextrose 10%, 12.5 g, Intravenous, PRN    dextrose 10%, 25 g, Intravenous, PRN    oxyCODONE, 5 mg, Oral, Q6H PRN    prochlorperazine, 5 mg, Intravenous, Q6H PRN    simethicone, 40 mg, Oral, QID PRN     Review of patient's allergies indicates:  No Known Allergies  Objective:     Vital Signs (Most Recent):  Temp: 98.6 °F (37 °C) (08/27/24 0441)  Pulse: 74 (08/27/24 0441)  Resp: 16 (08/27/24 0441)  BP: (!) 141/64 (08/27/24 0441)  SpO2: 98 % (08/27/24 0441) Vital Signs (24h Range):  Temp:  [96 °F (35.6 °C)-99.4 °F (37.4 °C)] 98.6 °F (37 °C)  Pulse:  [71-78] 74  Resp:  [10-25] 16  SpO2:  [94 %-100 %] 98 %  BP: (130-167)/(63-80) 141/64     Weight: 129.6 kg (285 lb 11.5 oz)  Body mass index is 49.04 kg/m².    Intake/Output - Last 3 Shifts         08/25 0700 08/26 0659 08/26 0700 08/27 0659 08/27 0700 08/28 0659    P.O.  45     I.V. (mL/kg)  1087.6 (8.4)     IV Piggyback  1600     Total  Intake(mL/kg)  2732.6 (21.1)     Net  +2732.6            Urine Occurrence  3 x              Physical Exam  Vitals reviewed.   Constitutional:       Appearance: She is obese.   Cardiovascular:      Rate and Rhythm: Normal rate and regular rhythm.      Pulses: Normal pulses.   Pulmonary:      Effort: Pulmonary effort is normal.   Abdominal:      General: Abdomen is flat.      Palpations: Abdomen is soft.      Comments: Incisions CDI  Appropriately tender, soft, non-distended   Skin:     General: Skin is warm.      Capillary Refill: Capillary refill takes less than 2 seconds.   Neurological:      General: No focal deficit present.      Mental Status: She is alert.          Significant Labs:  I have reviewed all pertinent lab results within the past 24 hours.  CBC:   Recent Labs   Lab 08/27/24  0336   WBC 11.80   RBC 4.55   HGB 13.5   HCT 40.7      MCV 90   MCH 29.7   MCHC 33.2     CMP:   Recent Labs   Lab 08/27/24  0336   GLU 89   CALCIUM 9.5   *   K 3.7   CO2 24   CL 97   BUN 7   CREATININE 0.8       Significant Diagnostics:  I have reviewed all pertinent imaging results/findings within the past 24 hours.

## 2024-08-27 NOTE — DISCHARGE SUMMARY
[facility] - Surgery  General Surgery  Discharge Summary      Patient Name: Criss Link  MRN: [id number]  Admission Date: 8/26/2024  Hospital Length of Stay: 1 days  Discharge Date and Time:  08/27/2024 1:51 PM  Attending Physician: Edyta Hernandez   Discharging Provider: Wyatt Bundy MD  Primary Care Provider: Leanna Galeano DO    HPI:   Criss Link is a 54 year-old morbidly obese female with BMI 48.63 kg/m² and multiple associated comorbidities including HTN, CATE, CBP, MDD/NIKOLAY, and prediabetes, who presents for pre-operative exam for weight loss surgery. She has failed multiple attempts at non-surgical methods of weight loss and has completed the [facility] program which she started on 4/15/24 at which time her BMI was 49.53 kg/m². She meets NIH consensus criteria for bariatric surgery and is interested in undergoing laparoscopic sleeve gastrectomy.      Labs 5/9/24: HbA1c 6.0%, Chol 223, B1 11  CXR 4/20/24: No acute cardiopulmonary process  EKG 5/9/24: NSR  Stress echo 5/9/24: Mildly increased LV wall thickness with mild concentric hypertrophy, EF 55-60%, intermediate CVP 8 mmHg, nausea and SOB during stress, 1.0-mm horizontal ST segment depression noted during stress, no arrhythmias, ECG positive for ischemia, neg for stress-induced ischemia     Clearances:  Cardiology: Mclaughlin 5/15/24  Psych: Ball 5/13/24    Procedure(s) (LRB):  GASTRECTOMY, SLEEVE, LAPAROSCOPIC with intraop EGD (N/A)  BLOCK, TRANSVERSUS ABDOMINIS PLANE (Bilateral)      Indwelling Lines/Drains at time of discharge:   Lines/Drains/Airways       None                 Hospital Course: Please see the preoperative H&P and other available documentation for full details related to history prior to this admission.  Briefly, Criss Link is a 54 y.o. male who was admitted following scheduled elective surgery for obesity.       Following a complete preoperative discussion of the risks and benefits of surgery with signed informed  consent, the patient was taken to the operating room on 8/26/24 and underwent the above stated procedures. The patient tolerated surgery well and there were no complications. Please see the operative report for full intraoperative findings and details.       Postoperatively, the patient did well and was transferred from the PACU to the floor in stable condition where they had a stable and uncomplicated hospital course.      Labs and vital signs remained stable and appropriate throughout course. Diet was advanced as tolerated and the patient's pain was controlled on oral pain medications without problem. Currently, the patient is doing well and is stable and appropriate for discharge at this time. Discharge instructions discussed with patient at bedside, all questions and concerns addressed.      Pain at the time of discharge was 0/10       Goals of Care Treatment Preferences:         Consults:     Significant Diagnostic Studies: N/A    Pending Diagnostic Studies:       Procedure Component Value Units Date/Time    Specimen to Pathology, Surgery General Surgery [[id number]] Collected: 08/26/24 0904    Order Status: Sent Lab Status: In process Updated: 08/26/24 1336    Specimen: Tissue           Final Active Diagnoses:    Diagnosis Date Noted POA    PRINCIPAL PROBLEM:  Obesity [E66.9] 08/26/2024 Yes      Problems Resolved During this Admission:      Discharged Condition: good    Disposition: Home or Self Care    Follow Up:   Follow-up Information       Edyta Hernandez MD Follow up in 2 week(s).    Specialties: General Surgery, Bariatrics  Contact information:  [address]  [facility] [id number] [phone]                           Patient Instructions:   No discharge procedures on file.  Medications:  Reconciled Home Medications:      Medication List        CHANGE how you take these medications      amLODIPine 5 MG tablet  Commonly known as: NORVASC  Take 1 tablet (5 mg total) by mouth once  daily.  What changed: when to take this     * sertraline 100 MG tablet  Commonly known as: ZOLOFT  Take 1 tablet (100 mg total) by mouth once daily.  What changed: when to take this     * sertraline 20 mg/mL concentrated solution  Commonly known as: ZOLOFT  Take 5 mLs (100 mg total) by mouth once daily. for 14 days  What changed: You were already taking a medication with the same name, and this prescription was added. Make sure you understand how and when to take each.     spironolactone 50 MG tablet  Commonly known as: ALDACTONE  Take 1 tablet (50 mg total) by mouth once daily.  What changed: when to take this           * This list has 2 medication(s) that are the same as other medications prescribed for you. Read the directions carefully, and ask your doctor or other care provider to review them with you.                CONTINUE taking these medications      D3-2000 50 mcg (2,000 unit) Cap capsule  Generic drug: cholecalciferol (vitamin D3)  Take 2,000 Units by mouth once daily.     ferrous gluconate 324 MG tablet  Commonly known as: FERGON  Take 324 mg by mouth daily with breakfast.     loratadine 10 mg tablet  Commonly known as: CLARITIN  Take 10 mg by mouth once daily.     meloxicam 15 MG tablet  Commonly known as: MOBIC  Take 1 tablet (15 mg total) by mouth once daily.     montelukast 10 mg tablet  Commonly known as: SINGULAIR  TAKE 1 TABLET BY MOUTH EVERY DAY IN THE EVENING     omeprazole 40 MG capsule  Commonly known as: PRILOSEC  Take 1 capsule (40 mg total) by mouth every morning. Open capsule and take with apple sauce     thiamine 250 MG tablet  Take 250 mg by mouth every 7 days.     tiZANidine 4 MG tablet  Commonly known as: ZANAFLEX  TAKE 1 TABLET (4 MG TOTAL) BY MOUTH NIGHTLY AS NEEDED (BACK PAIN).     ursodioL 500 MG tablet  Commonly known as: ACTIGALL  Take 1 tablet (500 mg total) by mouth once daily.     VITAMIN C 1000 MG tablet  Generic drug: ascorbic acid (vitamin C)  Take 1,000 mg by mouth once  daily.            STOP taking these medications      azelastine 137 mcg (0.1 %) nasal spray  Commonly known as: ASTELIN     dorzolamide 2 % ophthalmic solution  Commonly known as: TRUSOPT            ASK your doctor about these medications      acetaminophen 500 mg/15 mL Liqd  Take 30 mLs (1,000 mg total) by mouth every 8 (eight) hours. Take 1g (30mL) every 8 hours for at least 3 days and up to 5 days as needed. May take one additional dose (500mg or 15mL) per day for breakthrough. Do not exceed 4g in 24 hours.     gabapentin 300 MG capsule  Commonly known as: NEURONTIN  Take 1 cap by mouth 2 times daily. Open capsule and empty prior to taking. Take one dose on morning of surgery prior to arrival. Take 1 cap twice a day for 3 days post-op. Continue for an additional 2 days as needed.     ondansetron 8 MG Tbdl  Commonly known as: ZOFRAN-ODT  Take 1 tablet (8 mg total) by mouth every 6 (six) hours as needed (nausea).            Time spent on the discharge of patient: 15 minutes    Wyatt Bundy MD  General Surgery  [facility] - Surgery

## 2024-08-27 NOTE — HOSPITAL COURSE
Please see the preoperative H&P and other available documentation for full details related to history prior to this admission.  Briefly, Criss Link is a 54 y.o. male who was admitted following scheduled elective surgery for obesity.       Following a complete preoperative discussion of the risks and benefits of surgery with signed informed consent, the patient was taken to the operating room on 8/26/24 and underwent the above stated procedures. The patient tolerated surgery well and there were no complications. Please see the operative report for full intraoperative findings and details.       Postoperatively, the patient did well and was transferred from the PACU to the floor in stable condition where they had a stable and uncomplicated hospital course.      Labs and vital signs remained stable and appropriate throughout course. Diet was advanced as tolerated and the patient's pain was controlled on oral pain medications without problem. Currently, the patient is doing well and is stable and appropriate for discharge at this time. Discharge instructions discussed with patient at bedside, all questions and concerns addressed.      Pain at the time of discharge was 0/10

## 2024-08-27 NOTE — NURSING
Water protocol initiated at 0500. Pt tolerating well. Call light within reach. Will continue plan of care

## 2024-08-29 LAB
FINAL PATHOLOGIC DIAGNOSIS: NORMAL
GROSS: NORMAL
Lab: NORMAL
MICROSCOPIC EXAM: NORMAL

## 2024-08-29 NOTE — PLAN OF CARE
[facility] - Surgery  Discharge Final Note    Primary Care Provider: Leanna Galeano DO    Expected Discharge Date: 8/27/2024    Final Discharge Note (most recent)       Final Note - 08/27/24 1815          Final Note    Assessment Type Final Discharge Note     Anticipated Discharge Disposition Home or Self Care     Hospital Resources/Appts/Education Provided Provided patient/caregiver with written discharge plan information;Provided education on problems/symptoms using teachback                   Future Appointments   Date Time Provider Department Center   9/3/2024 10:30 AM Rosalba Hopper RD [facility] DAGMAR [facility]   9/9/2024 10:10 AM LAB, APPOINTMENT [place] [facility]   9/9/2024 10:30 AM Tara Cordon PA-C [facility] DAGMAR [facility]   9/9/2024 11:00 AM Chacha Khan RD [facility] DAGMAR [facility]   10/21/2024  8:40 AM [facility]   10/21/2024  9:00 AM Tara Cordon PA-C NOM [facility]   10/21/2024  9:30 AM Chacha Khan RD [facility] DAGMAR [facility]   2/28/2025  9:40 AM LAB, APPOINTMENT [place] [facility] LAB [facility]   2/28/2025 10:00 AM Laura Neal NP [facility]     Contact Info       Edyta Hernandez MD   Specialty: General Surgery, Bariatrics    [address] [facility] [address]   Phone: [phone]       Next Steps: Follow up in 2 week(s)

## 2024-09-03 ENCOUNTER — PATIENT MESSAGE (OUTPATIENT)
Dept: BARIATRICS | Facility: CLINIC | Age: 54
End: 2024-09-03

## 2024-09-03 ENCOUNTER — TELEPHONE (OUTPATIENT)
Dept: BARIATRICS | Facility: CLINIC | Age: 54
End: 2024-09-03
Payer: COMMERCIAL

## 2024-09-03 ENCOUNTER — CLINICAL SUPPORT (OUTPATIENT)
Dept: BARIATRICS | Facility: CLINIC | Age: 54
End: 2024-09-03
Payer: COMMERCIAL

## 2024-09-03 DIAGNOSIS — Z98.84 S/P LAPAROSCOPIC SLEEVE GASTRECTOMY: ICD-10-CM

## 2024-09-03 DIAGNOSIS — G47.33 OSA ON CPAP: ICD-10-CM

## 2024-09-03 DIAGNOSIS — E66.01 MORBID OBESITY: ICD-10-CM

## 2024-09-03 DIAGNOSIS — Z71.3 DIETARY COUNSELING AND SURVEILLANCE: Primary | ICD-10-CM

## 2024-09-03 DIAGNOSIS — I10 ESSENTIAL HYPERTENSION: ICD-10-CM

## 2024-09-03 PROCEDURE — 99499 UNLISTED E&M SERVICE: CPT | Mod: 95,,, | Performed by: DIETITIAN, REGISTERED

## 2024-09-03 NOTE — PROGRESS NOTES
Established Patient - Audio Only Telehealth Visit     The patient location is: home   The chief complaint leading to consultation is: morbid obesity follow up after bariatric surgery  Visit type: Virtual visit with audio only (telephone)  Total time spent with patient: 15 min       The reason for the audio only service rather than synchronous audio and video virtual visit was related to technical difficulties or patient preference/necessity.     Each patient to whom I provide medical services by telemedicine is:  (1) informed of the relationship between the physician and patient and the respective role of any other health care provider with respect to management of the patient; and (2) notified that they may decline to receive medical services by telemedicine and may withdraw from such care at any time. Patient verbally consented to receive this service via voice-only telephone call.    NUTRITION NOTE    Referring Physician: Edyta Rosas M.D.   Reason for MNT Referral: Follow-up 1 Week s/p laproscopic sleeve gastrectomy 8/26/2024    CURRENT DIET:  Bariatric Liquid Diet    Dehydration assessment:  Urine output/color:dark yellow at night, lightens up during day  Chest pain:wheezing sound while breathing normally, no chest pain  Persistent increased heart rate:none reported  Fatigue:none  Dizzy/weak: not able to sleep as much and believes this has made her dizzy  N/V: none  BM:Thursday last bowel movement, took  clearlax    Protein and fluid intake assessment: (food diary)    Fluids include: water  Fluid intake yesterday: water 48 ounces and moving up to 64 ounces, coffee and broth  Protein supplements: Premier   Protein intake yesterday: 1 premier    Vitamins Will start today  Multivitamin Barimelts  B-1/Super B complex 250 mg once a week   Calcium Citrate 500 mg with vitamin D, 1 tablespoon 3 per day [place]  Vitamin B-12 2500 mcg , once a week [place]    Medications  Omeprazole:takes with protein  shake  How are you tolerating pain at this time? (rate on a scale from 1 to 10; >7 notify PA/MD):0  Did you take your acetaminophen and gabapentin for 3 days?will stop on Thursday  Did you have to take additional acetaminophen for break through pain (pain scale 4-6)?none  Did you have any severe pain that required oxycodone? If yes, how much did you use and do you have left?0    How is the support system at home?none reported  Exercise reminder (light exercise at this time, no lifting above 10 lbs)reminded     Questions for nurse/MA/PA: no    BARIATRIC DIET DISCUSSION:  Reinforced post-op nutrition guidelines.  Continue to work on fluid and protein intake.    PLAN/RECOMMONDATIONS:  Continue bariatric high protein liquid diet.  Maintain protein intake or increase gradually to goal of 60 g prot/day.  Maintain fluid intake or increase gradually to goal of 64 floz/day.  Begin appropriate vitamins & minerals.  Begin or continue light exercise.     Confirmed date and time for 2 week po labs and clinic visit     SESSION TIME: 15 minutes                              This service was not originating from a related E/M service provided within the previous 7 days nor will  to an E/M service or procedure within the next 24 hours or my soonest available appointment.  Prevailing standard of care was able to be met in this audio-only visit.

## 2024-09-03 NOTE — TELEPHONE ENCOUNTER
1030-rec'd incoming call from pt stating she hasn't rec'd her 1 wk postop diet call. TEAMS message send to TRISHA Pollack notifying her that pt was waiting. All questions and concerns addressed.  Rec'd message from TRISHA Pollack stating that pt stated she is wheezing for no apparent reason. And c/o constipation.   Constipation regime provided to pt via portal and reviewed. Pt denies any SOB or CP. Instructed pt to use incentive spirometer every 30-60 minutes to expand lungs, cupping exercises explained. Encouraged pt to increase walking and hydration. Understanding stated. Encouraged to pt to do deep breathing and coughing exercises to increase lung volume. Pt verbally agreed. Instructed pt if wheezing persist, see PCP. Instructed if if she develops SBO or CP she should go to the nearest ED. Understanding stated. All questions and concerns addressed.

## 2024-09-09 ENCOUNTER — OFFICE VISIT (OUTPATIENT)
Dept: BARIATRICS | Facility: CLINIC | Age: 54
End: 2024-09-09
Payer: COMMERCIAL

## 2024-09-09 ENCOUNTER — CLINICAL SUPPORT (OUTPATIENT)
Dept: BARIATRICS | Facility: CLINIC | Age: 54
End: 2024-09-09
Payer: COMMERCIAL

## 2024-09-09 ENCOUNTER — LAB VISIT (OUTPATIENT)
Dept: LAB | Facility: HOSPITAL | Age: 54
End: 2024-09-09
Payer: COMMERCIAL

## 2024-09-09 VITALS
DIASTOLIC BLOOD PRESSURE: 60 MMHG | OXYGEN SATURATION: 99 % | HEART RATE: 72 BPM | TEMPERATURE: 99 F | SYSTOLIC BLOOD PRESSURE: 104 MMHG | WEIGHT: 255.94 LBS | BODY MASS INDEX: 43.93 KG/M2

## 2024-09-09 DIAGNOSIS — Z98.84 S/P LAPAROSCOPIC SLEEVE GASTRECTOMY: ICD-10-CM

## 2024-09-09 DIAGNOSIS — E66.01 MORBID OBESITY WITH BMI OF 40.0-44.9, ADULT: ICD-10-CM

## 2024-09-09 DIAGNOSIS — F41.1 GENERALIZED ANXIETY DISORDER: ICD-10-CM

## 2024-09-09 DIAGNOSIS — I10 ESSENTIAL HYPERTENSION: ICD-10-CM

## 2024-09-09 DIAGNOSIS — E66.01 MORBID OBESITY: ICD-10-CM

## 2024-09-09 DIAGNOSIS — G47.33 OSA ON CPAP: ICD-10-CM

## 2024-09-09 DIAGNOSIS — Z98.890 POST-OPERATIVE STATE: Primary | ICD-10-CM

## 2024-09-09 DIAGNOSIS — R63.4 WEIGHT LOSS: ICD-10-CM

## 2024-09-09 DIAGNOSIS — Z71.3 DIETARY COUNSELING AND SURVEILLANCE: Primary | ICD-10-CM

## 2024-09-09 LAB
ALBUMIN SERPL BCP-MCNC: 4.1 G/DL (ref 3.5–5.2)
ALP SERPL-CCNC: 111 U/L (ref 55–135)
ALT SERPL W/O P-5'-P-CCNC: 31 U/L (ref 10–44)
ANION GAP SERPL CALC-SCNC: 13 MMOL/L (ref 8–16)
AST SERPL-CCNC: 22 U/L (ref 10–40)
BASOPHILS # BLD AUTO: 0.03 K/UL (ref 0–0.2)
BASOPHILS NFR BLD: 0.4 % (ref 0–1.9)
BILIRUB SERPL-MCNC: 0.4 MG/DL (ref 0.1–1)
BUN SERPL-MCNC: 10 MG/DL (ref 6–20)
CALCIUM SERPL-MCNC: 9.7 MG/DL (ref 8.7–10.5)
CHLORIDE SERPL-SCNC: 101 MMOL/L (ref 95–110)
CO2 SERPL-SCNC: 25 MMOL/L (ref 23–29)
CREAT SERPL-MCNC: 0.8 MG/DL (ref 0.5–1.4)
DIFFERENTIAL METHOD BLD: ABNORMAL
EOSINOPHIL # BLD AUTO: 0.1 K/UL (ref 0–0.5)
EOSINOPHIL NFR BLD: 1.4 % (ref 0–8)
ERYTHROCYTE [DISTWIDTH] IN BLOOD BY AUTOMATED COUNT: 12.7 % (ref 11.5–14.5)
EST. GFR  (NO RACE VARIABLE): >60 ML/MIN/1.73 M^2
GLUCOSE SERPL-MCNC: 87 MG/DL (ref 70–110)
HCT VFR BLD AUTO: 41.6 % (ref 37–48.5)
HGB BLD-MCNC: 13.1 G/DL (ref 12–16)
IMM GRANULOCYTES # BLD AUTO: 0.02 K/UL (ref 0–0.04)
IMM GRANULOCYTES NFR BLD AUTO: 0.3 % (ref 0–0.5)
LYMPHOCYTES # BLD AUTO: 1.3 K/UL (ref 1–4.8)
LYMPHOCYTES NFR BLD: 18.2 % (ref 18–48)
MCH RBC QN AUTO: 28.1 PG (ref 27–31)
MCHC RBC AUTO-ENTMCNC: 31.5 G/DL (ref 32–36)
MCV RBC AUTO: 89 FL (ref 82–98)
MONOCYTES # BLD AUTO: 0.5 K/UL (ref 0.3–1)
MONOCYTES NFR BLD: 7 % (ref 4–15)
NEUTROPHILS # BLD AUTO: 5.2 K/UL (ref 1.8–7.7)
NEUTROPHILS NFR BLD: 72.7 % (ref 38–73)
NRBC BLD-RTO: 0 /100 WBC
PLATELET # BLD AUTO: 377 K/UL (ref 150–450)
PMV BLD AUTO: 10.3 FL (ref 9.2–12.9)
POTASSIUM SERPL-SCNC: 4.1 MMOL/L (ref 3.5–5.1)
PROT SERPL-MCNC: 7.7 G/DL (ref 6–8.4)
RBC # BLD AUTO: 4.66 M/UL (ref 4–5.4)
SODIUM SERPL-SCNC: 139 MMOL/L (ref 136–145)
VIT B12 SERPL-MCNC: 1802 PG/ML (ref 210–950)
WBC # BLD AUTO: 7.13 K/UL (ref 3.9–12.7)

## 2024-09-09 PROCEDURE — 3078F DIAST BP <80 MM HG: CPT | Mod: CPTII,S$GLB,, | Performed by: PHYSICIAN ASSISTANT

## 2024-09-09 PROCEDURE — 1159F MED LIST DOCD IN RCRD: CPT | Mod: CPTII,S$GLB,, | Performed by: PHYSICIAN ASSISTANT

## 2024-09-09 PROCEDURE — 99999 PR PBB SHADOW E&M-EST. PATIENT-LVL V: CPT | Mod: PBBFAC,,, | Performed by: PHYSICIAN ASSISTANT

## 2024-09-09 PROCEDURE — 82607 VITAMIN B-12: CPT | Performed by: PHYSICIAN ASSISTANT

## 2024-09-09 PROCEDURE — 3074F SYST BP LT 130 MM HG: CPT | Mod: CPTII,S$GLB,, | Performed by: PHYSICIAN ASSISTANT

## 2024-09-09 PROCEDURE — 99024 POSTOP FOLLOW-UP VISIT: CPT | Mod: S$GLB,,, | Performed by: PHYSICIAN ASSISTANT

## 2024-09-09 PROCEDURE — 80053 COMPREHEN METABOLIC PANEL: CPT | Performed by: PHYSICIAN ASSISTANT

## 2024-09-09 PROCEDURE — 1160F RVW MEDS BY RX/DR IN RCRD: CPT | Mod: CPTII,S$GLB,, | Performed by: PHYSICIAN ASSISTANT

## 2024-09-09 PROCEDURE — 85025 COMPLETE CBC W/AUTO DIFF WBC: CPT | Performed by: PHYSICIAN ASSISTANT

## 2024-09-09 PROCEDURE — 99999 PR PBB SHADOW E&M-EST. PATIENT-LVL I: CPT | Mod: PBBFAC,,, | Performed by: DIETITIAN, REGISTERED

## 2024-09-09 PROCEDURE — 3044F HG A1C LEVEL LT 7.0%: CPT | Mod: CPTII,S$GLB,, | Performed by: PHYSICIAN ASSISTANT

## 2024-09-09 PROCEDURE — 84425 ASSAY OF VITAMIN B-1: CPT | Performed by: PHYSICIAN ASSISTANT

## 2024-09-09 NOTE — PATIENT INSTRUCTIONS
High Protein Pureed Diet    2 weeks after gastric bypass and sleeve you may be ready to add pureed food to your diet.  All food should be the consistency of baby food, or thinner.  Follow pureed diet for the next 2 weeks.    Protein - It is very important to pay attention to protein intake during this time.      Inadequate protein intake can cause:  Delayed Wound Healing  Hair Loss  Muscle Breakdown    Meal Plan - Eat 3-4 meals per day (2-4 tbsp each), with protein supplements in between to meet protein needs.  Meeting protein needs daily will help increase healing, decrease muscle loss, and increase weight loss.  Your goal is  grams of protein a day.    Protein First - Always eat the foods with the highest protein first.  Foods high in protein include milk, yogurt, cheese, egg whites, and blenderized meat, seafood, and beans.    Fluids - Keep track in your journal of how much you are drinking; you should try to drink at least 64oz of fluids every day.      Foods allowed: Portion size Protein (g)   Sugar-free clear liquids As desired 0   Skim or 1% milk ½ cup 4   Sugar free pudding, light yogurt, custard (use skim or 1% milk in preparation) 3 oz 2.5   Strained baby food meats, or home-made pureed lean meats and shrimp 1 oz 7   Beans (red, white, black, lima, duarte, fat free refried, hummus) and lentils ¼ cup 4   Low-fat/fat free cheese.(cottage cheese, mozzarella string cheese, ricotta cheese, Laughing Cow, Baby Bell, cheddar, etc) ¼ cup 7-8   Scrambled eggs or Egg Beaters 1 or ¼ cup 6   Edamame or Tofu, mashed ¼ cup 5   Unflavored protein powder (add to 1 scoop to  98% fat free soups or SF pudding) 3 Tbsp 9   *PB2: peanut powder (45 calories) 2 Tbsp 5     *PB2 powdered peanut butter: 45 calories vs. 190 calories in 2 tbsp of regular peanut butter. Purchase online at [employer], or  at various [employer], [employer], [employer], [employer] and [employer].        Bariatric Liquid/Pureed Sample Menu    3-4 small meals  plus 2-3 protein drinks per day.    8am 1 egg or ¼ cup Egg Beaters   9am 1 cup water, or decaf coffee or tea   10am Protein drink, 30g protein   11am 2 tbsp low-fat cottage cheese, and 1 tbsp pureed peaches   12pm 1 cup water, or sugar-free lemonade    1pm 2 tbsp pureed chicken, and 1 tbsp pureed carrots    2pm 1 cup water, or sugar-free lemonade   3pm Protein drink, 30g protein   5pm 1 cup water    6pm 1 cup hi-protein creamy chicken soup 14g protein (see Recipe below)   7pm 1 cup water, or sugar-free fruit punch    8pm 1 cup water     This sample menu provides approx. 80g protein and 64oz fluids.  Liquid protein supplements should contain 20-30g protein and less than 4 grams of sugar each.    Sip fluids continuously in between meals.  Drink at least ¼ cup every 15 minutes.  For fluids: ¼ cup = 2 oz = 4 tbsp       RECIPE IDEAS for Bariatric Pureed Diet:    Hi-Protein Creamy Chicken Soup: (10g protein per 1 cup serving)  Empty 1 can of 98% fat free cream of chicken soup into saucepan. Then  blend 1 scoop of unflavored protein powder with 1 can of skim milk until smooth.  Add protein milk to saucepan and heat to warm. (Note: Do NOT boil. Protein powder may clump if heated too hot).     Hi-Protein Pudding: (14g protein per ½ cup serving)  Add 2 scoops protein powder to 2 cups cold skim milk and mix well.  Stir in dry Jell-O Sugar-Free Instant Pudding mix.  Chill and Enjoy!    Tuna Mousse (12g protein per ¼ cup serving) Page 135 in book Eating Well After Weight Loss Surgery.  In a  or , combine all ingredients and pulse until smooth.  2 6-ounce cans tuna packed in water, drained  2 tbsp low-fat mayonnaise  2 tbsp fat-free sour cream  2 tbsp fat-free cream cheese, softened  ½ cup shallots, finely chopped  1 tbsp lemon juice  ¼ tsp ground pepper  ½ tsp celery seed    Chocolate Peanut Butter Mousse  (28g protein total)  6oz plain Greek yogurt  4 tbsp chocolate PB2

## 2024-09-09 NOTE — PROGRESS NOTES
NUTRITION NOTE    Referring Physician: Edyta Rosas M.D.   Reason for MNT Referral: Follow-up 2 Weeks s/p laparoscopic sleeve gastrectomy    PAST MEDICAL HISTORY:  Denies nausea, vomiting, and diarrhea.  Reports problems, including constipation .    Past Medical History:   Diagnosis Date    Acute bilateral low back pain without sciatica 01/10/2024    - s/p fall  - no signs of neurological claudication   -recommend x-ray imaging  -recommend physical therapy      Allergies     Bilateral carpal tunnel syndrome 01/10/2024    - discussed that her symptoms seem consistent with carpal tunnel syndrome  -recommend start with bilateral carpal tunnel wrist splints.  I recommend wearing at night.  She can also try risk compression while she was working during the day.    - if no improvement of continued worsening recommend evaluation by hand surgery      Decreased range of motion (ROM) of left knee 06/29/2022    Depression     Hypertension     Idiopathic polypoidal choroidal vasculopathy     Prediabetes        CLINICAL DATA:  54 y.o.-year-old Black or  female.    Current Weight: 255 lbs  BMI: 43.93  Total Weight Loss: 24 lbs    Excess Weight Loss: 15%      CURRENT DIET:  Bariatric Liquid Diet    Diet Recall: 90 grams of protein/day; 51-64 oz of fluids/day    Diet Includes:  Protein Supplements: Premier Protein  Fluids: Water, coffee, broth    EXERCISE:  Walking 25 mins on treadmill     LABS:  None available at time of visit    VITAMINS/MINERALS:  Barimelts multivitamin with iron  Calcium Citrate 500 mg 2 pills, 2 x day  B-1 250 mg once a week   Vitamin B-12 2500 mcg, once a week     ASSESSMENT:  Doing well overall.  Adequate protein intake.  Adequate fluid intake.    BARIATRIC DIET DISCUSSION:  Instructed and provided written materials on bariatric puree diet plan   Bariatric soft diet plan to start in 2 weeks as michael  Pt may swallow tablets and pills at 2 week post op   Reinforced post-op nutrition  guidelines.    PLAN/RECOMMONDATIONS:  Advance to bariatric puree diet  Maintain protein intake.  Increase fluid intake.  Continue light exercise.  Continue appropriate vitamins & minerals.    Return to clinic in 6 weeks.    SESSION TIME: 30 minutes

## 2024-09-09 NOTE — PROGRESS NOTES
BARIATRIC POST-OPERATIVE VISIT:    HPI:  Criss Link is a 54 y.o. year old female presents for 2 week post op visit following s/p sleeve.  she is doing well and tolerating the diet without difficulty.  she has no complaints.    Pt states that she is doing well   Having some knee pain   States that she can hear herself breathing   Denies chest pain sob at rest or trouble inhaling   [facility]     Denies: nausea, vomiting, abdominal pain, changes in bowel movement pattern, fever, chills, dysphagia, chest pain, and shortness of breath.    Review of Systems   Constitutional:  Negative for chills, fatigue and fever.   HENT:  Negative for tinnitus and trouble swallowing.    Eyes:  Negative for visual disturbance.   Respiratory:  Negative for choking, chest tightness and shortness of breath.    Cardiovascular:  Negative for chest pain, palpitations and leg swelling.   Gastrointestinal:  Negative for abdominal pain, blood in stool, constipation, diarrhea, nausea and vomiting.       Post Discharge     2 Weeks     Total Opioids Used (Outpatient): 0 tabsml: mLs  Unused Opioids Returned: No Educated on safe opioid disposal location at [facility] outpatient pharmacy.   Additional Opioids Provided: no    EXERCISE & VITAMINS:  See Bariatric Assessment  Adherent to vitamin   MEDICATIONS/ALLERGIES:  Have been reviewed.    DIET: Liquid Bariatric Diet.  3 protein shakes daily, ~90 grams protein.  50 fl oz SF clear beverage.  See Dietician note from today for a more detailed assessment.    Boiled egg this am     Physical Exam  Vitals reviewed.   Constitutional:       Appearance: She is obese.   HENT:      Head: Normocephalic and atraumatic.   Eyes:      Extraocular Movements: Extraocular movements intact.      Conjunctiva/sclera: Conjunctivae normal.      Pupils: Pupils are equal, round, and reactive to light.   Cardiovascular:      Rate and Rhythm: Normal rate and regular rhythm.      Pulses:  Normal pulses.      Heart sounds: Normal heart sounds.   Pulmonary:      Effort: Pulmonary effort is normal. No respiratory distress.      Breath sounds: Normal breath sounds.   Abdominal:      General: Bowel sounds are normal.      Palpations: Abdomen is soft.      Tenderness: There is no abdominal tenderness.   Musculoskeletal:         General: No swelling. Normal range of motion.      Cervical back: Normal range of motion and neck supple.   Skin:     General: Skin is warm and dry.      Capillary Refill: Capillary refill takes less than 2 seconds.   Neurological:      General: No focal deficit present.      Mental Status: She is alert and oriented to person, place, and time.   Psychiatric:         Mood and Affect: Mood normal.         Behavior: Behavior normal.         Thought Content: Thought content normal.         Judgment: Judgment normal.     ASSESSMENT:  - Morbid obesity s/p sleeve gastrectomy   - Co-morbidities: hypertension and anxiety  - Good Weight loss, 24 #'s and 17% EWL  - Good Exercise routine  - Good Diet  - Good Vitamin regimen    PLAN:  - Ursodiol 500 mg daily for 6 months  - Anti-Acid medication, PPI daily for 3 months  - No lifting more than 10 lbs for 6 weeks  - Miralax daily for constipation  - Emphasized the importance of regular exercise and adherence to bariatric diet to achieve maximum weight loss.  - Encouraged patient to start/continue regular exercise.  - Follow-up with dietician to advance diet.  - Continue daily vitamins and medications.  - RTC per schedule  - Call the office for any issues.  - Check labs today.

## 2024-09-10 ENCOUNTER — PATIENT MESSAGE (OUTPATIENT)
Dept: BARIATRICS | Facility: CLINIC | Age: 54
End: 2024-09-10
Payer: COMMERCIAL

## 2024-09-13 LAB — VIT B1 BLD-MCNC: 83 UG/L (ref 38–122)

## 2024-09-24 ENCOUNTER — CLINICAL SUPPORT (OUTPATIENT)
Dept: PRIMARY CARE CLINIC | Facility: CLINIC | Age: 54
End: 2024-09-24
Payer: COMMERCIAL

## 2024-09-24 ENCOUNTER — TELEPHONE (OUTPATIENT)
Dept: PRIMARY CARE CLINIC | Facility: CLINIC | Age: 54
End: 2024-09-24

## 2024-09-24 VITALS
HEART RATE: 74 BPM | HEIGHT: 64 IN | DIASTOLIC BLOOD PRESSURE: 74 MMHG | BODY MASS INDEX: 43.93 KG/M2 | SYSTOLIC BLOOD PRESSURE: 122 MMHG

## 2024-09-24 DIAGNOSIS — I10 ESSENTIAL HYPERTENSION: Primary | ICD-10-CM

## 2024-09-24 PROCEDURE — 99999 PR PBB SHADOW E&M-EST. PATIENT-LVL III: CPT | Mod: PBBFAC,,,

## 2024-09-24 NOTE — PROGRESS NOTES
Criss Link 54 y.o. female is here for Blood Pressure check. in person    Manual Blood pressure reading was  122/74, Pulse 74. (Checked at the beginning of the visit)    If high, was it repeated after 15 minutes? yes    Pt's Home blood pressure machine read in office /, Pulse .     Diagnosed with Hypertension yes.    Patient took blood pressure medication today no.  Last dose of blood pressure medication was taken last night    l Medications and OTC medication updated yes    Does patient have record of home blood pressure readings / Blood Pressure Log yes.     Does the pt have any complaints today in regards to their blood pressure medication? no. Complains of nothing Patient is asymptomatic.     Were you sitting still for 5-10 minutes prior to taking your Blood pressure? yes     Has your blood pressure monitor ever been checked? no When was last time we checked your blood pressure monitor?     Updated vitals yes        Creatinine   Date Value Ref Range Status   09/09/2024 0.8 0.5 - 1.4 mg/dL Final     Sodium   Date Value Ref Range Status   09/09/2024 139 136 - 145 mmol/L Final     Potassium   Date Value Ref Range Status   09/09/2024 4.1 3.5 - 5.1 mmol/L Final      Manual Blood pressure reading was  122/74, Pulse 74. (Checked at the end of the visit)    If high, was it repeated after 15 minutes? yes      Follow up date is 10/31/24.     Dr. Galeano notified.

## 2024-09-24 NOTE — TELEPHONE ENCOUNTER
Patient in clinic for bp check   Manual bp 122/74 P74    Patient reports she occasional takes bp at home, has no numbers to report, patient states she took her bp this am but could recall exact numbers    Patient reports she takes her Norvasc medication every night    Patient denies headache, sob, dizziness, chest pain and vision     Issues. Patient reports feeling better since sleeve surgery 1 month ago      Reminded patient of next appointment with PCP 10/31/24            Criss Fariba 54 y.o. female is here for Blood Pressure check. in person     Manual Blood pressure reading was  122/74, Pulse 74. (Checked at the beginning of the visit)     If high, was it repeated after 15 minutes? yes     Pt's Home blood pressure machine read in office /, Pulse .      Diagnosed with Hypertension yes.     Patient took blood pressure medication today no.  Last dose of blood pressure medication was taken last night     l Medications and OTC medication updated yes     Does patient have record of home blood pressure readings / Blood Pressure Log yes.      Does the pt have any complaints today in regards to their blood pressure medication? no. Complains of nothing Patient is asymptomatic.      Were you sitting still for 5-10 minutes prior to taking your Blood pressure? yes      Has your blood pressure monitor ever been checked? no When was last time we checked your blood pressure monitor?      Updated vitals yes                 Creatinine   Date Value Ref Range Status   09/09/2024 0.8 0.5 - 1.4 mg/dL Final            Sodium   Date Value Ref Range Status   09/09/2024 139 136 - 145 mmol/L Final            Potassium   Date Value Ref Range Status   09/09/2024 4.1 3.5 - 5.1 mmol/L Final      Manual Blood pressure reading was  122/74, Pulse 74. (Checked at the end of the visit)     If high, was it repeated after 15 minutes? yes

## 2024-10-01 ENCOUNTER — PATIENT MESSAGE (OUTPATIENT)
Dept: BARIATRICS | Facility: CLINIC | Age: 54
End: 2024-10-01
Payer: COMMERCIAL

## 2024-10-08 ENCOUNTER — PATIENT MESSAGE (OUTPATIENT)
Dept: BARIATRICS | Facility: CLINIC | Age: 54
End: 2024-10-08
Payer: COMMERCIAL

## 2024-10-18 NOTE — PROGRESS NOTES
NUTRITION NOTE  Referring Physician: Edyta Rosas M.D.   Reason for MNT Referral: Follow-up 8 Weeks s/p laparoscopic sleeve gastrectomy    PAST MEDICAL HISTORY:  Denies nausea, vomiting, and diarrhea.  Reports problems, including constipation  and hemorrhoids    Past Medical History:   Diagnosis Date    Acute bilateral low back pain without sciatica 01/10/2024    - s/p fall  - no signs of neurological claudication   -recommend x-ray imaging  -recommend physical therapy      Allergies     Bilateral carpal tunnel syndrome 01/10/2024    - discussed that her symptoms seem consistent with carpal tunnel syndrome  -recommend start with bilateral carpal tunnel wrist splints.  I recommend wearing at night.  She can also try risk compression while she was working during the day.    - if no improvement of continued worsening recommend evaluation by hand surgery      Decreased range of motion (ROM) of left knee 06/29/2022    Depression     Hypertension     Idiopathic polypoidal choroidal vasculopathy     Prediabetes        CLINICAL DATA:  54 y.o.-year-old Black or  female.    Current Weight: 239 lbs  BMI: 41.06  Total Weight Loss: 40 lb    Excess Weight Loss: 28%      CURRENT DIET:  Bariatric Soft Diet    Pt reports drinking 4 protein shakes per day    Diet Recall: 120+ grams of protein/day; 48 oz of fluids/day    B: Protein shake or boiled or scrambled egg and avocado  Coffee  S: Yogurt with fruit  L: 1/4th of canned chicken or tuna or crab meat salad with avocado   D: Same as lunch    Diet Includes:   Meal Pattern: 3 meal(s) + 1 snack(s) + 4 protein supplement(s)  Excess protein supplement intake. Premier Protein  Adequate dairy intake.  Adequate vegetable intake. Avocado, broccoli, cauliflower, mushrooms, bell peppers  Adequate fruit intake. Blackberries, strawberries, oranges, pears, grapes  Starchy CHO: none  Beverages Water, coffee, SF powdered tea or lemonade    EXERCISE:  Gym 4 days/week -  upper and lower body strength training and 35 mins treadmill     Restrictions to Exercise: None.    LABS:  None available at time of visit    VITAMINS/MINERALS:  Barimelts multivitamin with iron. Plans to switch to pills  Calcium Citrate 500 mg 2 pills, 2 x day  B-1 250 mg once a week   Vitamin B-12 2500 mcg, once a week     ASSESSMENT:  Doing fairly well overall.  Excess protein intake.  Inadequate fluid intake.  Advancing diet appropriately.  Exercising.  Adequate vitamins & minerals.    BARIATRIC DIET DISCUSSION:  Instructed and provided written materials on bariatric regular diet plan.  Reinforced post-op nutrition guidelines.  Reminded PT no starchy CHO until goal weight  Discussed tracking intake to ensure protein goals and calorie range are met    PLAN / RECOMMENDATIONS:  Advance to bariatric regular diet.  Decrease protein intake.  Increase fluid intake.  Continue light exercise.  Continue appropriate vitamins & minerals.    Return to clinic in 4 months.    SESSION TIME: 20 minutes

## 2024-10-21 ENCOUNTER — LAB VISIT (OUTPATIENT)
Dept: LAB | Facility: HOSPITAL | Age: 54
End: 2024-10-21
Payer: COMMERCIAL

## 2024-10-21 ENCOUNTER — OFFICE VISIT (OUTPATIENT)
Dept: BARIATRICS | Facility: CLINIC | Age: 54
End: 2024-10-21
Payer: COMMERCIAL

## 2024-10-21 ENCOUNTER — CLINICAL SUPPORT (OUTPATIENT)
Dept: BARIATRICS | Facility: CLINIC | Age: 54
End: 2024-10-21
Payer: COMMERCIAL

## 2024-10-21 VITALS
HEART RATE: 57 BPM | DIASTOLIC BLOOD PRESSURE: 67 MMHG | WEIGHT: 239.19 LBS | HEIGHT: 64 IN | OXYGEN SATURATION: 100 % | SYSTOLIC BLOOD PRESSURE: 128 MMHG | TEMPERATURE: 98 F | BODY MASS INDEX: 40.83 KG/M2

## 2024-10-21 DIAGNOSIS — Z98.890 POST-OPERATIVE STATE: Primary | ICD-10-CM

## 2024-10-21 DIAGNOSIS — Z71.3 DIETARY COUNSELING AND SURVEILLANCE: Primary | ICD-10-CM

## 2024-10-21 DIAGNOSIS — I10 ESSENTIAL HYPERTENSION: ICD-10-CM

## 2024-10-21 DIAGNOSIS — G47.33 OSA ON CPAP: ICD-10-CM

## 2024-10-21 DIAGNOSIS — Z98.84 S/P LAPAROSCOPIC SLEEVE GASTRECTOMY: ICD-10-CM

## 2024-10-21 DIAGNOSIS — E66.01 MORBID OBESITY: ICD-10-CM

## 2024-10-21 DIAGNOSIS — Z98.84 S/P BARIATRIC SURGERY: ICD-10-CM

## 2024-10-21 DIAGNOSIS — E66.01 MORBID OBESITY WITH BMI OF 40.0-44.9, ADULT: ICD-10-CM

## 2024-10-21 DIAGNOSIS — R63.4 WEIGHT LOSS: ICD-10-CM

## 2024-10-21 LAB
25(OH)D3+25(OH)D2 SERPL-MCNC: 64 NG/ML (ref 30–96)
ALBUMIN SERPL BCP-MCNC: 3.7 G/DL (ref 3.5–5.2)
ALP SERPL-CCNC: 117 U/L (ref 40–150)
ALT SERPL W/O P-5'-P-CCNC: 11 U/L (ref 10–44)
ANION GAP SERPL CALC-SCNC: 12 MMOL/L (ref 8–16)
AST SERPL-CCNC: 14 U/L (ref 10–40)
BASOPHILS # BLD AUTO: 0.01 K/UL (ref 0–0.2)
BASOPHILS NFR BLD: 0.1 % (ref 0–1.9)
BILIRUB SERPL-MCNC: 0.5 MG/DL (ref 0.1–1)
BUN SERPL-MCNC: 8 MG/DL (ref 6–20)
CALCIUM SERPL-MCNC: 9.7 MG/DL (ref 8.7–10.5)
CHLORIDE SERPL-SCNC: 102 MMOL/L (ref 95–110)
CHOLEST SERPL-MCNC: 213 MG/DL (ref 120–199)
CHOLEST/HDLC SERPL: 4.2 {RATIO} (ref 2–5)
CO2 SERPL-SCNC: 27 MMOL/L (ref 23–29)
CREAT SERPL-MCNC: 0.7 MG/DL (ref 0.5–1.4)
DIFFERENTIAL METHOD BLD: NORMAL
EOSINOPHIL # BLD AUTO: 0.1 K/UL (ref 0–0.5)
EOSINOPHIL NFR BLD: 0.9 % (ref 0–8)
ERYTHROCYTE [DISTWIDTH] IN BLOOD BY AUTOMATED COUNT: 13.7 % (ref 11.5–14.5)
EST. GFR  (NO RACE VARIABLE): >60 ML/MIN/1.73 M^2
GLUCOSE SERPL-MCNC: 92 MG/DL (ref 70–110)
HCT VFR BLD AUTO: 41.6 % (ref 37–48.5)
HDLC SERPL-MCNC: 51 MG/DL (ref 40–75)
HDLC SERPL: 23.9 % (ref 20–50)
HGB BLD-MCNC: 13.3 G/DL (ref 12–16)
IMM GRANULOCYTES # BLD AUTO: 0.02 K/UL (ref 0–0.04)
IMM GRANULOCYTES NFR BLD AUTO: 0.3 % (ref 0–0.5)
IRON SERPL-MCNC: 53 UG/DL (ref 30–160)
LDLC SERPL CALC-MCNC: 147.4 MG/DL (ref 63–159)
LYMPHOCYTES # BLD AUTO: 1.3 K/UL (ref 1–4.8)
LYMPHOCYTES NFR BLD: 19.3 % (ref 18–48)
MCH RBC QN AUTO: 28.8 PG (ref 27–31)
MCHC RBC AUTO-ENTMCNC: 32 G/DL (ref 32–36)
MCV RBC AUTO: 90 FL (ref 82–98)
MONOCYTES # BLD AUTO: 0.5 K/UL (ref 0.3–1)
MONOCYTES NFR BLD: 7.1 % (ref 4–15)
NEUTROPHILS # BLD AUTO: 4.9 K/UL (ref 1.8–7.7)
NEUTROPHILS NFR BLD: 72.3 % (ref 38–73)
NONHDLC SERPL-MCNC: 162 MG/DL
NRBC BLD-RTO: 0 /100 WBC
PLATELET # BLD AUTO: 329 K/UL (ref 150–450)
PMV BLD AUTO: 10.8 FL (ref 9.2–12.9)
POTASSIUM SERPL-SCNC: 3.8 MMOL/L (ref 3.5–5.1)
PROT SERPL-MCNC: 7.3 G/DL (ref 6–8.4)
RBC # BLD AUTO: 4.62 M/UL (ref 4–5.4)
SATURATED IRON: 18 % (ref 20–50)
SODIUM SERPL-SCNC: 141 MMOL/L (ref 136–145)
TOTAL IRON BINDING CAPACITY: 300 UG/DL (ref 250–450)
TRANSFERRIN SERPL-MCNC: 203 MG/DL (ref 200–375)
TRIGL SERPL-MCNC: 73 MG/DL (ref 30–150)
VIT B12 SERPL-MCNC: 1265 PG/ML (ref 210–950)
WBC # BLD AUTO: 6.78 K/UL (ref 3.9–12.7)

## 2024-10-21 PROCEDURE — 3078F DIAST BP <80 MM HG: CPT | Mod: CPTII,S$GLB,, | Performed by: PHYSICIAN ASSISTANT

## 2024-10-21 PROCEDURE — 3074F SYST BP LT 130 MM HG: CPT | Mod: CPTII,S$GLB,, | Performed by: PHYSICIAN ASSISTANT

## 2024-10-21 PROCEDURE — 85025 COMPLETE CBC W/AUTO DIFF WBC: CPT | Performed by: PHYSICIAN ASSISTANT

## 2024-10-21 PROCEDURE — 99999 PR PBB SHADOW E&M-EST. PATIENT-LVL I: CPT | Mod: PBBFAC,,, | Performed by: DIETITIAN, REGISTERED

## 2024-10-21 PROCEDURE — 83540 ASSAY OF IRON: CPT | Performed by: PHYSICIAN ASSISTANT

## 2024-10-21 PROCEDURE — 82607 VITAMIN B-12: CPT | Performed by: PHYSICIAN ASSISTANT

## 2024-10-21 PROCEDURE — 1160F RVW MEDS BY RX/DR IN RCRD: CPT | Mod: CPTII,S$GLB,, | Performed by: PHYSICIAN ASSISTANT

## 2024-10-21 PROCEDURE — 99999 PR PBB SHADOW E&M-EST. PATIENT-LVL V: CPT | Mod: PBBFAC,,, | Performed by: PHYSICIAN ASSISTANT

## 2024-10-21 PROCEDURE — 1159F MED LIST DOCD IN RCRD: CPT | Mod: CPTII,S$GLB,, | Performed by: PHYSICIAN ASSISTANT

## 2024-10-21 PROCEDURE — 3044F HG A1C LEVEL LT 7.0%: CPT | Mod: CPTII,S$GLB,, | Performed by: PHYSICIAN ASSISTANT

## 2024-10-21 PROCEDURE — 80053 COMPREHEN METABOLIC PANEL: CPT | Performed by: PHYSICIAN ASSISTANT

## 2024-10-21 PROCEDURE — 36415 COLL VENOUS BLD VENIPUNCTURE: CPT | Performed by: PHYSICIAN ASSISTANT

## 2024-10-21 PROCEDURE — 80061 LIPID PANEL: CPT | Performed by: PHYSICIAN ASSISTANT

## 2024-10-21 PROCEDURE — 82306 VITAMIN D 25 HYDROXY: CPT | Performed by: PHYSICIAN ASSISTANT

## 2024-10-21 PROCEDURE — 99024 POSTOP FOLLOW-UP VISIT: CPT | Mod: S$GLB,,, | Performed by: PHYSICIAN ASSISTANT

## 2024-10-21 PROCEDURE — 84425 ASSAY OF VITAMIN B-1: CPT | Performed by: PHYSICIAN ASSISTANT

## 2024-10-21 RX ORDER — URSODIOL 500 MG/1
500 TABLET, FILM COATED ORAL DAILY
Qty: 30 TABLET | Refills: 1 | Status: SHIPPED | OUTPATIENT
Start: 2024-10-21 | End: 2024-12-20

## 2024-10-21 RX ORDER — OMEPRAZOLE 40 MG/1
40 CAPSULE, DELAYED RELEASE ORAL EVERY MORNING
Qty: 30 CAPSULE | Refills: 2 | Status: SHIPPED | OUTPATIENT
Start: 2024-10-21

## 2024-10-21 RX ORDER — OMEPRAZOLE 40 MG/1
40 CAPSULE, DELAYED RELEASE ORAL EVERY MORNING
Qty: 30 CAPSULE | Refills: 2 | Status: SHIPPED | OUTPATIENT
Start: 2024-10-21 | End: 2024-10-21

## 2024-10-21 RX ORDER — URSODIOL 500 MG/1
500 TABLET, FILM COATED ORAL DAILY
Qty: 30 TABLET | Refills: 5 | Status: SHIPPED | OUTPATIENT
Start: 2024-10-21 | End: 2024-10-21

## 2024-10-21 NOTE — PROGRESS NOTES
BARIATRIC POST-OPERATIVE VISIT:    HPI:  Criss Link is a 54 y.o. year old female presents for 8 week post op visit following s/p sleeve.  she is doing well and tolerating the diet without difficulty.  she has no complaints.    Pt states that she is doing well   Having some constipation from the protein shakes     Denies: nausea, vomiting, abdominal pain, changes in bowel movement pattern, fever, chills, dysphagia, chest pain, and shortness of breath.    Review of Systems   Constitutional:  Negative for chills, fatigue and fever.   HENT:  Negative for tinnitus and trouble swallowing.    Eyes:  Negative for visual disturbance.   Respiratory:  Negative for choking, chest tightness and shortness of breath.    Cardiovascular:  Negative for chest pain, palpitations and leg swelling.   Gastrointestinal:  Positive for constipation. Negative for abdominal pain, blood in stool, diarrhea, nausea and vomiting.   Genitourinary:  Negative for decreased urine volume.   Skin:  Negative for rash.   Neurological:  Negative for dizziness, light-headedness and headaches.   Psychiatric/Behavioral:  Negative for decreased concentration, self-injury, sleep disturbance and suicidal ideas. The patient is not nervous/anxious.        Post Discharge     8 Weeks     Total Opioids Used (Outpatient): 0 tabsml: mLs  Unused Opioids Returned: No Educated on safe opioid disposal location [facility].   Additional Opioids Provided: no    EXERCISE & VITAMINS:  See Bariatric Assessment  Adherent to vitamin   MEDICATIONS/ALLERGIES:  Have been reviewed.    DIET: See Dietician note from today for a more detailed assessment.        Physical Exam  Vitals reviewed.   Constitutional:       Appearance: She is obese.   HENT:      Head: Normocephalic and atraumatic.   Eyes:      Extraocular Movements: Extraocular movements intact.      Conjunctiva/sclera: Conjunctivae normal.      Pupils: Pupils are equal, round, and reactive to light.    Cardiovascular:      Rate and Rhythm: Normal rate and regular rhythm.      Pulses: Normal pulses.      Heart sounds: Normal heart sounds.   Pulmonary:      Effort: Pulmonary effort is normal. No respiratory distress.      Breath sounds: Normal breath sounds.   Abdominal:      General: Bowel sounds are normal.      Palpations: Abdomen is soft.      Tenderness: There is no abdominal tenderness.   Musculoskeletal:         General: No swelling. Normal range of motion.      Cervical back: Normal range of motion and neck supple.   Skin:     General: Skin is warm and dry.      Capillary Refill: Capillary refill takes less than 2 seconds.   Neurological:      General: No focal deficit present.      Mental Status: She is alert and oriented to person, place, and time.   Psychiatric:         Mood and Affect: Mood normal.         Behavior: Behavior normal.         Thought Content: Thought content normal.         Judgment: Judgment normal.       ASSESSMENT:  - Morbid obesity s/p sleeve gastrectomy   - Co-morbidities: hypertension and anxiety  - Good Weight loss, 40#'s and 28% EWL  - Good Exercise routine  - Good Diet  - Good Vitamin regimen    PLAN:  - Ursodiol 500 mg daily for 6 months  - Anti-Acid medication, PPI daily for 3 months  - No lifting more than 10 lbs for 6 weeks  - Miralax daily for constipation  - Emphasized the importance of regular exercise and adherence to bariatric diet to achieve maximum weight loss.  - Encouraged patient to start/continue regular exercise.  - Follow-up with dietician to advance diet.  - Continue daily vitamins and medications.  - RTC per schedule  - Call the office for any issues.  - Check labs today.

## 2024-10-24 LAB — VIT B1 BLD-MCNC: 69 UG/L (ref 38–122)

## 2024-10-28 ENCOUNTER — TELEPHONE (OUTPATIENT)
Dept: PRIMARY CARE CLINIC | Facility: CLINIC | Age: 54
End: 2024-10-28
Payer: COMMERCIAL

## 2024-10-29 ENCOUNTER — TELEPHONE (OUTPATIENT)
Dept: BARIATRICS | Facility: CLINIC | Age: 54
End: 2024-10-29
Payer: COMMERCIAL

## 2024-10-30 PROBLEM — E66.9 OBESITY: Status: RESOLVED | Noted: 2024-08-26 | Resolved: 2024-10-30

## 2024-10-30 RX ORDER — ZOSTER VACCINE RECOMBINANT, ADJUVANTED 50 MCG/0.5
KIT INTRAMUSCULAR
Qty: 1 EACH | Refills: 1 | Status: CANCELLED | OUTPATIENT
Start: 2024-10-30

## 2024-10-31 ENCOUNTER — OFFICE VISIT (OUTPATIENT)
Dept: PRIMARY CARE CLINIC | Facility: CLINIC | Age: 54
End: 2024-10-31
Attending: FAMILY MEDICINE
Payer: COMMERCIAL

## 2024-10-31 VITALS
HEART RATE: 54 BPM | WEIGHT: 239.75 LBS | HEIGHT: 64 IN | BODY MASS INDEX: 40.93 KG/M2 | SYSTOLIC BLOOD PRESSURE: 104 MMHG | OXYGEN SATURATION: 100 % | DIASTOLIC BLOOD PRESSURE: 68 MMHG

## 2024-10-31 DIAGNOSIS — F33.42 RECURRENT MAJOR DEPRESSIVE DISORDER, IN FULL REMISSION: ICD-10-CM

## 2024-10-31 DIAGNOSIS — E66.01 CLASS 3 SEVERE OBESITY DUE TO EXCESS CALORIES WITH SERIOUS COMORBIDITY AND BODY MASS INDEX (BMI) OF 40.0 TO 44.9 IN ADULT: ICD-10-CM

## 2024-10-31 DIAGNOSIS — L68.0 HIRSUTISM: ICD-10-CM

## 2024-10-31 DIAGNOSIS — I10 ESSENTIAL HYPERTENSION: ICD-10-CM

## 2024-10-31 DIAGNOSIS — Z00.01 ENCOUNTER FOR GENERAL ADULT MEDICAL EXAMINATION WITH ABNORMAL FINDINGS: Primary | ICD-10-CM

## 2024-10-31 DIAGNOSIS — R73.03 PREDIABETES: ICD-10-CM

## 2024-10-31 DIAGNOSIS — Z12.11 SCREEN FOR COLON CANCER: ICD-10-CM

## 2024-10-31 DIAGNOSIS — E66.813 CLASS 3 SEVERE OBESITY DUE TO EXCESS CALORIES WITH SERIOUS COMORBIDITY AND BODY MASS INDEX (BMI) OF 40.0 TO 44.9 IN ADULT: ICD-10-CM

## 2024-10-31 DIAGNOSIS — F41.1 GENERALIZED ANXIETY DISORDER: ICD-10-CM

## 2024-10-31 DIAGNOSIS — Z12.31 ENCOUNTER FOR SCREENING MAMMOGRAM FOR BREAST CANCER: ICD-10-CM

## 2024-10-31 PROCEDURE — 99999 PR PBB SHADOW E&M-EST. PATIENT-LVL IV: CPT | Mod: PBBFAC,,, | Performed by: FAMILY MEDICINE

## 2024-10-31 RX ORDER — SERTRALINE HYDROCHLORIDE 100 MG/1
TABLET, FILM COATED ORAL
Start: 2024-10-31

## 2024-11-02 ENCOUNTER — PATIENT MESSAGE (OUTPATIENT)
Dept: BARIATRICS | Facility: CLINIC | Age: 54
End: 2024-11-02
Payer: COMMERCIAL

## 2024-11-12 ENCOUNTER — PATIENT MESSAGE (OUTPATIENT)
Dept: BARIATRICS | Facility: CLINIC | Age: 54
End: 2024-11-12
Payer: COMMERCIAL

## 2024-11-13 ENCOUNTER — PATIENT MESSAGE (OUTPATIENT)
Dept: PRIMARY CARE CLINIC | Facility: CLINIC | Age: 54
End: 2024-11-13
Payer: COMMERCIAL

## 2024-12-03 ENCOUNTER — PATIENT MESSAGE (OUTPATIENT)
Dept: BARIATRICS | Facility: CLINIC | Age: 54
End: 2024-12-03
Payer: COMMERCIAL

## 2024-12-10 ENCOUNTER — PATIENT MESSAGE (OUTPATIENT)
Dept: BARIATRICS | Facility: CLINIC | Age: 54
End: 2024-12-10
Payer: COMMERCIAL

## 2025-01-06 ENCOUNTER — PATIENT MESSAGE (OUTPATIENT)
Dept: BARIATRICS | Facility: CLINIC | Age: 55
End: 2025-01-06
Payer: COMMERCIAL

## 2025-01-09 RX ORDER — AMLODIPINE BESYLATE 5 MG/1
5 TABLET ORAL
Qty: 90 TABLET | OUTPATIENT
Start: 2025-01-09

## 2025-01-09 NOTE — TELEPHONE ENCOUNTER
No care due was identified.  [facility] Embedded Care Due Messages. Reference number: [id number].   1/09/2025 12:45:30 AM CST

## 2025-01-09 NOTE — TELEPHONE ENCOUNTER
Refill Decision Note   Criss Navarrovis  is requesting a refill authorization.  Brief Assessment and Rationale for Refill:  Quick Discontinue     Medication Therapy Plan:  Med d/c on 10/31/24 by PCP; [facility]      Comments:     Note composed:7:33 AM 01/09/2025

## 2025-01-13 ENCOUNTER — PATIENT MESSAGE (OUTPATIENT)
Dept: PRIMARY CARE CLINIC | Facility: CLINIC | Age: 55
End: 2025-01-13
Payer: COMMERCIAL

## 2025-01-13 DIAGNOSIS — L68.0 HIRSUTISM: ICD-10-CM

## 2025-01-13 DIAGNOSIS — I10 ESSENTIAL HYPERTENSION: ICD-10-CM

## 2025-01-13 RX ORDER — AMLODIPINE BESYLATE 5 MG/1
5 TABLET ORAL DAILY
Qty: 90 TABLET | Refills: 3 | Status: SHIPPED | OUTPATIENT
Start: 2025-01-13 | End: 2026-01-13

## 2025-01-13 RX ORDER — SPIRONOLACTONE 50 MG/1
50 TABLET, FILM COATED ORAL DAILY
Qty: 90 TABLET | Refills: 3 | Status: SHIPPED | OUTPATIENT
Start: 2025-01-13 | End: 2026-01-13

## 2025-01-13 RX ORDER — SPIRONOLACTONE 50 MG/1
50 TABLET, FILM COATED ORAL DAILY
Qty: 90 TABLET | Refills: 3 | Status: SHIPPED | OUTPATIENT
Start: 2025-01-13 | End: 2025-01-13 | Stop reason: SDUPTHER

## 2025-01-13 NOTE — TELEPHONE ENCOUNTER
Refill Encounter    PCP Visits: Recent Visits  Date Type Provider Dept   10/31/24 Office Visit Leanna Galeano DO [facility]   Showing recent visits within past 360 days and meeting all other requirements  Future Appointments  No visits were found meeting these conditions.  Showing future appointments within next 720 days and meeting all other requirements     Last 3 Blood Pressure:   BP Readings from Last 3 Encounters:   10/31/24 104/68   10/21/24 128/67   09/24/24 122/74     Preferred Pharmacy:   [facility]/pharmacy #[id number] - [facility] [place] - [address]  [address]  [place] [address]  Phone: [phone] Fax: [phone]    [facility]/pharmacy #[id number] - [place] - [address]  [address] [facility] [address]  Phone: [phone] Fax: [phone]    [facility]/pharmacy #[id number] - [place] - [address]  [address]  [place] [address]  Phone: [phone] Fax: [phone]    Requested RX:  Requested Prescriptions     Pending Prescriptions Disp Refills    spironolactone (ALDACTONE) 50 MG tablet 90 tablet 3     Sig: Take 1 tablet (50 mg total) by mouth once daily.      RX Route: Normal

## 2025-01-13 NOTE — TELEPHONE ENCOUNTER
No care due was identified.  [facility] Embedded Care Due Messages. Reference number: [id number].   1/13/2025 9:22:31 AM CST

## 2025-01-13 NOTE — TELEPHONE ENCOUNTER
Orders Placed This Encounter    spironolactone (ALDACTONE) 50 MG tablet    amLODIPine (NORVASC) 5 MG tablet     Dr. Leanna Galeano D.O.   [facility]

## 2025-01-14 ENCOUNTER — PATIENT MESSAGE (OUTPATIENT)
Dept: BARIATRICS | Facility: CLINIC | Age: 55
End: 2025-01-14
Payer: COMMERCIAL

## 2025-02-10 ENCOUNTER — PATIENT MESSAGE (OUTPATIENT)
Dept: BARIATRICS | Facility: CLINIC | Age: 55
End: 2025-02-10
Payer: COMMERCIAL

## 2025-02-14 ENCOUNTER — HOSPITAL ENCOUNTER (OUTPATIENT)
Dept: RADIOLOGY | Facility: HOSPITAL | Age: 55
Discharge: HOME | End: 2025-02-14
Attending: FAMILY MEDICINE
Payer: COMMERCIAL

## 2025-02-14 DIAGNOSIS — Z12.31 ENCOUNTER FOR SCREENING MAMMOGRAM FOR BREAST CANCER: ICD-10-CM

## 2025-02-14 PROCEDURE — 77063 BREAST TOMOSYNTHESIS BI: CPT | Mod: 26,,, | Performed by: RADIOLOGY

## 2025-02-14 PROCEDURE — 77067 SCR MAMMO BI INCL CAD: CPT | Mod: 26,,, | Performed by: RADIOLOGY

## 2025-02-14 PROCEDURE — 77067 SCR MAMMO BI INCL CAD: CPT | Mod: TC

## 2025-02-17 ENCOUNTER — RESULTS FOLLOW-UP (OUTPATIENT)
Dept: PRIMARY CARE CLINIC | Facility: CLINIC | Age: 55
End: 2025-02-17

## 2025-02-27 ENCOUNTER — TELEPHONE (OUTPATIENT)
Dept: BARIATRICS | Facility: CLINIC | Age: 55
End: 2025-02-27
Payer: COMMERCIAL

## 2025-02-27 NOTE — TELEPHONE ENCOUNTER
Returned pt call in regard to rescheduling post op appt on 2/28. No answer, left VM for pt to call back for assistance.

## 2025-02-27 NOTE — TELEPHONE ENCOUNTER
----- Message from Natividad sent at 2/27/2025 10:39 AM CST -----  Type:  Reschedule Apoointment RequestCaller is requesting a sooner appointment.  Name of Caller:Ms Kahn When is the first available appointment?n/aSymptoms:Post Op Would the patient rather a call back or a response via [facility]? Call Best Call Back Number: [phone] Information: she states she needs to reschedule for 02/28 please call if she don't answer leave a message

## 2025-03-10 ENCOUNTER — PATIENT MESSAGE (OUTPATIENT)
Dept: BARIATRICS | Facility: CLINIC | Age: 55
End: 2025-03-10
Payer: COMMERCIAL

## 2025-03-17 ENCOUNTER — PATIENT MESSAGE (OUTPATIENT)
Dept: PRIMARY CARE CLINIC | Facility: CLINIC | Age: 55
End: 2025-03-17

## 2025-03-17 ENCOUNTER — OFFICE VISIT (OUTPATIENT)
Dept: PRIMARY CARE CLINIC | Facility: CLINIC | Age: 55
End: 2025-03-17
Attending: FAMILY MEDICINE
Payer: COMMERCIAL

## 2025-03-17 VITALS
OXYGEN SATURATION: 100 % | BODY MASS INDEX: 42.41 KG/M2 | TEMPERATURE: 99 F | DIASTOLIC BLOOD PRESSURE: 84 MMHG | SYSTOLIC BLOOD PRESSURE: 139 MMHG | HEIGHT: 64 IN | WEIGHT: 248.44 LBS | HEART RATE: 62 BPM

## 2025-03-17 DIAGNOSIS — R11.0 NAUSEA: ICD-10-CM

## 2025-03-17 DIAGNOSIS — L30.9 DERMATITIS: ICD-10-CM

## 2025-03-17 DIAGNOSIS — J01.00 ACUTE NON-RECURRENT MAXILLARY SINUSITIS: Primary | ICD-10-CM

## 2025-03-17 DIAGNOSIS — Z12.11 SCREEN FOR COLON CANCER: ICD-10-CM

## 2025-03-17 PROBLEM — Z00.01 ENCOUNTER FOR GENERAL ADULT MEDICAL EXAMINATION WITH ABNORMAL FINDINGS: Status: RESOLVED | Noted: 2024-10-31 | Resolved: 2025-03-17

## 2025-03-17 LAB
CTP QC/QA: YES
CTP QC/QA: YES
POC MOLECULAR INFLUENZA A AGN: NEGATIVE
POC MOLECULAR INFLUENZA B AGN: NEGATIVE
SARS-COV-2 RDRP RESP QL NAA+PROBE: NEGATIVE

## 2025-03-17 PROCEDURE — 3008F BODY MASS INDEX DOCD: CPT | Mod: CPTII,S$GLB,, | Performed by: FAMILY MEDICINE

## 2025-03-17 PROCEDURE — 99214 OFFICE O/P EST MOD 30 MIN: CPT | Mod: S$GLB,,, | Performed by: FAMILY MEDICINE

## 2025-03-17 PROCEDURE — 87502 INFLUENZA DNA AMP PROBE: CPT | Mod: QW,S$GLB,, | Performed by: FAMILY MEDICINE

## 2025-03-17 PROCEDURE — 87635 SARS-COV-2 COVID-19 AMP PRB: CPT | Mod: QW,S$GLB,, | Performed by: FAMILY MEDICINE

## 2025-03-17 PROCEDURE — 1160F RVW MEDS BY RX/DR IN RCRD: CPT | Mod: CPTII,S$GLB,, | Performed by: FAMILY MEDICINE

## 2025-03-17 PROCEDURE — 3075F SYST BP GE 130 - 139MM HG: CPT | Mod: CPTII,S$GLB,, | Performed by: FAMILY MEDICINE

## 2025-03-17 PROCEDURE — 1159F MED LIST DOCD IN RCRD: CPT | Mod: CPTII,S$GLB,, | Performed by: FAMILY MEDICINE

## 2025-03-17 PROCEDURE — 3079F DIAST BP 80-89 MM HG: CPT | Mod: CPTII,S$GLB,, | Performed by: FAMILY MEDICINE

## 2025-03-17 PROCEDURE — 99999 PR PBB SHADOW E&M-EST. PATIENT-LVL IV: CPT | Mod: PBBFAC,,, | Performed by: FAMILY MEDICINE

## 2025-03-17 RX ORDER — ONDANSETRON 4 MG/1
4 TABLET, FILM COATED ORAL EVERY 6 HOURS PRN
Qty: 20 TABLET | Refills: 0 | Status: SHIPPED | OUTPATIENT
Start: 2025-03-17

## 2025-03-17 RX ORDER — AMOXICILLIN AND CLAVULANATE POTASSIUM 875; 125 MG/1; MG/1
1 TABLET, FILM COATED ORAL EVERY 12 HOURS
Qty: 14 TABLET | Refills: 0 | Status: SHIPPED | OUTPATIENT
Start: 2025-03-17 | End: 2025-03-24

## 2025-03-17 RX ORDER — TRIAMCINOLONE ACETONIDE 1 MG/G
CREAM TOPICAL 2 TIMES DAILY
Qty: 28.4 G | Refills: 0 | Status: SHIPPED | OUTPATIENT
Start: 2025-03-17

## 2025-03-17 RX ORDER — AMOXICILLIN AND CLAVULANATE POTASSIUM 875; 125 MG/1; MG/1
1 TABLET, FILM COATED ORAL EVERY 12 HOURS
Qty: 14 TABLET | Refills: 0 | Status: CANCELLED | OUTPATIENT
Start: 2025-03-17 | End: 2025-03-24

## 2025-03-17 NOTE — PROGRESS NOTES
FAMILY MEDICINE  [facility]    Reason for visit:   Chief Complaint   Patient presents with    Sinus Problem         SUBJECTIVE: Criss Link is a 54 y.o. female  - with obesity s/p gastric sleeve (08/2024), hypertension, prediabetes, hyperlipidemia, anxiety, depression and CATE presents for concerns for sinus issues    Gynecology Dr. Buddy Shaffer MD  Rheumatology Dr. Raciel Thayer MD  Bariatric surgery: Edyta Rodriguez      Criss reports feeling unwell with sinus problems and nausea. Symptoms began approximately 5 days ago, initially subsiding with medication but then returning. The condition worsened significantly 3 days ago, forcing her to leave work early. She has been resting over the weekend.    Criss describes sinus congestion with a sensation of fullness, nausea (started yesterday), and shortness of breath, especially with exertion. A sore throat was present earlier in the illness. She now feels mildly achy and has heaviness in the chest.    She has attempted to manage symptoms at home with OTC medications to alleviate congestion, but these interventions have not provided significant relief.    A skin issue is noted, with a spot erupting for about a week. She is unsure if this is related to perfume application.    She mentions exposure to ill coworkers, including one with a lingering cough from a respiratory infection, and her mother who had a stomach illness.    She denies fever and diarrhea.    Criss Link still has not completed her colon cancer screening.  She reports that she never received the Cologuard because she moved      Review of Systems   HENT:  Negative for hearing loss.    Eyes:  Negative for discharge.   Respiratory:  Negative for wheezing.    Cardiovascular:  Negative for chest pain and palpitations.   Gastrointestinal:  Positive for constipation. Negative for blood in stool, diarrhea and vomiting.   Genitourinary:  Negative for dysuria and  hematuria.   Musculoskeletal:  Negative for neck pain.   Neurological:  Negative for weakness and headaches.   Endo/Heme/Allergies:  Negative for polydipsia.   All other systems reviewed and are negative.      HEALTH MAINTENANCE:   Health Maintenance   Topic Date Due    Colorectal Cancer Screening  Never done    Shingles Vaccine (1 of 2) Never done    Pneumococcal Vaccines (Age 50+) (1 of 1 - PCV) Never done    Influenza Vaccine (1) 09/01/2024    COVID-19 Vaccine (3 - 2024-25 season) 09/01/2024    Hemoglobin A1c (Prediabetes)  08/14/2025    Mammogram  02/14/2027    Cervical Cancer Screening  04/08/2029    Lipid Panel  10/21/2029    TETANUS VACCINE  01/30/2030    RSV Vaccine (Age 60+ and Pregnant patients) (1 - 1-dose 75+ series) 05/09/2047    Hepatitis C Screening  Completed    HIV Screening  Completed     Health Maintenance Topics with due status: Not Due       Topic Last Completion Date    TETANUS VACCINE 01/30/2020    Cervical Cancer Screening 04/08/2024    Hemoglobin A1c (Prediabetes) 08/14/2024    Lipid Panel 10/21/2024    Mammogram 02/14/2025    RSV Vaccine (Age 60+ and Pregnant patients) Not Due     Health Maintenance Due   Topic Date Due    Colorectal Cancer Screening  Never done    Shingles Vaccine (1 of 2) Never done    Pneumococcal Vaccines (Age 50+) (1 of 1 - PCV) Never done    Influenza Vaccine (1) 09/01/2024    COVID-19 Vaccine (3 - 2024-25 season) 09/01/2024       HISTORY:   Past Medical History:   Diagnosis Date    Acute bilateral low back pain without sciatica 01/10/2024    - s/p fall  - no signs of neurological claudication   -recommend x-ray imaging  -recommend physical therapy      Allergies     Bilateral carpal tunnel syndrome 01/10/2024    - discussed that her symptoms seem consistent with carpal tunnel syndrome  -recommend start with bilateral carpal tunnel wrist splints.  I recommend wearing at night.  She can also try risk compression while she was working during the day.    - if no  improvement of continued worsening recommend evaluation by hand surgery      Decreased range of motion (ROM) of left knee 06/29/2022    Depression     Hypertension     Idiopathic polypoidal choroidal vasculopathy     Prediabetes        Past Surgical History:   Procedure Laterality Date    INJECTION OF ANESTHETIC AGENT INTO TISSUE PLANE DEFINED BY TRANSVERSUS ABDOMINIS MUSCLE Bilateral 8/26/2024    Procedure: BLOCK, TRANSVERSUS ABDOMINIS PLANE;  Surgeon: Edyta Hernandez MD;  Location: [facility] OR 2ND FLR;  Service: General;  Laterality: Bilateral;    LAPAROSCOPIC SLEEVE GASTRECTOMY N/A 8/26/2024    Procedure: GASTRECTOMY, SLEEVE, LAPAROSCOPIC with intraop EGD;  Surgeon: Edyta Hernandez MD;  [facility];  Service: General;  Laterality: N/A;    TUBAL LIGATION  12/30/2004    [facility]       Family History   Problem Relation Name Age of Onset    Heart disease Mother Adrianne Link     Depression Mother Adrianne Link     Hypertension Father Manuel Link     Diabetes Father Manuel Link     Kidney cancer Father Manuel Link     Cancer Father Manuel Link     No Known Problems Daughter      No Known Problems Son      Lymphoma Maternal Uncle      Lymphoma Maternal Grandmother      Cancer Maternal Grandmother         Social History     Tobacco Use    Smoking status: Never     Passive exposure: Never    Smokeless tobacco: Never   Substance Use Topics    Alcohol use: Yes     Alcohol/week: 2.0 standard drinks of alcohol     Types: 2 Drinks containing 0.5 oz of alcohol per week     Comment: Socially    Drug use: Never       Social History     Social History Narrative    . 1 son and 1 daughter (2023 daughter 17 yo). Mother and father live with her. Mother suffers from alcoholism. Sexually active    Tobacco: None    EtOH: 2 mixed drinks 3 times per week     Drug: None    Employment: Works at  [employer] (Works alongside [employer])     Education: 11th grade      Lives with her mother and 2  teenage children       ALLERGIES:   Review of patient's allergies indicates:  No Known Allergies    MEDS:   Current Outpatient Medications on File Prior to Visit   Medication Sig Dispense Refill Last Dose/Taking    amLODIPine (NORVASC) 5 MG tablet Take 1 tablet (5 mg total) by mouth once daily. 90 tablet 3 Taking    ascorbic acid, vitamin C, (VITAMIN C) 1000 MG tablet Take 1,000 mg by mouth once daily.   Taking    cholecalciferol, vitamin D3, (D3-2000) 50 mcg (2,000 unit) Cap capsule Take 2,000 Units by mouth once daily.   Taking    ferrous gluconate (FERGON) 324 MG tablet Take 324 mg by mouth daily with breakfast.   Taking    gabapentin (NEURONTIN) 300 MG capsule Take 1 cap by mouth 2 times daily. Open capsule and empty prior to taking. Take one dose on morning of surgery prior to arrival. Take 1 cap twice a day for 3 days post-op. Continue for an additional 2 days as needed. 10 capsule 0 Taking    loratadine (CLARITIN) 10 mg tablet Take 10 mg by mouth once daily.   Taking    montelukast (SINGULAIR) 10 mg tablet TAKE 1 TABLET BY MOUTH EVERY DAY IN THE EVENING 90 tablet 3 Taking    omeprazole (PRILOSEC) 40 MG capsule Take 1 capsule (40 mg total) by mouth every morning. Open capsule and take with apple sauce 30 capsule 2 Taking    spironolactone (ALDACTONE) 50 MG tablet Take 1 tablet (50 mg total) by mouth once daily. 90 tablet 3 Taking    thiamine 250 MG tablet Take 250 mg by mouth every 7 days.   Taking    tiZANidine (ZANAFLEX) 4 MG tablet TAKE 1 TABLET (4 MG TOTAL) BY MOUTH NIGHTLY AS NEEDED (BACK PAIN). 90 tablet 1 Taking As Needed    sertraline (ZOLOFT) 100 MG tablet Wean sertraline 100 mg to 50 mg daily x 1 week then 25 mg daily x 1 week then stop       ursodioL (ACTIGALL) 500 MG tablet Take 1 tablet (500 mg total) by mouth once daily. 30 tablet 1          Vital signs:   Vitals:    03/17/25 1105   BP: 139/84   Patient Position: Sitting   Pulse: 62   Temp: 98.7 °F (37.1 °C)   SpO2: 100%   Weight: 112.7 kg (248  "lb 7.3 oz)   Height: 5' 4" (1.626 m)         Body mass index is 42.65 kg/m².    PHYSICAL EXAM:     Physical Exam  Constitutional:       General: She is not in acute distress.  HENT:      Right Ear: Tympanic membrane and ear canal normal.      Left Ear: Tympanic membrane and ear canal normal.      Nose: Congestion and rhinorrhea present.      Right Sinus: Frontal sinus tenderness present. No maxillary sinus tenderness.      Left Sinus: Frontal sinus tenderness present. No maxillary sinus tenderness.   Cardiovascular:      Rate and Rhythm: Normal rate and regular rhythm.      Heart sounds: Normal heart sounds. No murmur heard.     No friction rub. No gallop.   Pulmonary:      Effort: Pulmonary effort is normal. No respiratory distress.      Breath sounds: Normal breath sounds. No wheezing, rhonchi or rales.   Musculoskeletal:      Cervical back: Neck supple.      Right lower leg: No edema.      Left lower leg: No edema.   Neurological:      Mental Status: She is alert.   Psychiatric:         Speech: Speech normal.             PERTINENT RESULTS:   03/17/2025 POC flu and covid-19 testing = negative    ASSESSMENT/PLAN:    1. Acute non-recurrent maxillary sinusitis  -     POCT Influenza A/B Molecular  -     POCT COVID-19 Rapid Screening  -     amoxicillin-clavulanate 875-125mg (AUGMENTIN) 875-125 mg per tablet; Take 1 tablet by mouth every 12 (twelve) hours. for 7 days  Dispense: 14 tablet; Refill: 0    2. Nausea  -     ondansetron (ZOFRAN) 4 MG tablet; Take 1 tablet (4 mg total) by mouth every 6 (six) hours as needed for Nausea.  Dispense: 20 tablet; Refill: 0    3. Dermatitis  -     triamcinolone acetonide 0.1% (KENALOG) 0.1 % cream; Apply topically 2 (two) times daily.  Dispense: 28.4 g; Refill: 0    4. Screen for colon cancer  -     Cologuard Screening (Multitarget Stool DNA); Future; Expected date: 03/17/2025          ORDERS:   Orders Placed This Encounter    Cologuard Screening (Multitarget Stool DNA)    POCT " Influenza A/B Molecular    POCT COVID-19 Rapid Screening    amoxicillin-clavulanate 875-125mg (AUGMENTIN) 875-125 mg per tablet    ondansetron (ZOFRAN) 4 MG tablet    triamcinolone acetonide 0.1% (KENALOG) 0.1 % cream         Vaccines recommended:  Flu, COVID-19 and shingles. She declined    Follow up if symptoms worsen or fail to improve. or sooner if any concerns.      This note is dictated using the M*Modal Fluency Direct word recognition program. There are word recognition mistakes that are occasionally missed on review.    Dr. Leanna Galeano D.O.   [facility]

## 2025-04-08 ENCOUNTER — PATIENT MESSAGE (OUTPATIENT)
Dept: BARIATRICS | Facility: CLINIC | Age: 55
End: 2025-04-08
Payer: COMMERCIAL

## 2025-05-05 ENCOUNTER — PATIENT MESSAGE (OUTPATIENT)
Dept: BARIATRICS | Facility: CLINIC | Age: 55
End: 2025-05-05
Payer: COMMERCIAL

## 2025-05-07 ENCOUNTER — TELEPHONE (OUTPATIENT)
Dept: BARIATRICS | Facility: CLINIC | Age: 55
End: 2025-05-07

## 2025-05-07 ENCOUNTER — OFFICE VISIT (OUTPATIENT)
Dept: BARIATRICS | Facility: CLINIC | Age: 55
End: 2025-05-07
Payer: COMMERCIAL

## 2025-05-07 VITALS
HEART RATE: 65 BPM | BODY MASS INDEX: 42.72 KG/M2 | HEIGHT: 64 IN | SYSTOLIC BLOOD PRESSURE: 135 MMHG | OXYGEN SATURATION: 95 % | WEIGHT: 250.25 LBS | DIASTOLIC BLOOD PRESSURE: 69 MMHG | TEMPERATURE: 98 F

## 2025-05-07 DIAGNOSIS — Z98.84 S/P LAPAROSCOPIC SLEEVE GASTRECTOMY: Primary | ICD-10-CM

## 2025-05-07 DIAGNOSIS — G47.33 OSA ON CPAP: ICD-10-CM

## 2025-05-07 DIAGNOSIS — F41.1 GENERALIZED ANXIETY DISORDER: ICD-10-CM

## 2025-05-07 DIAGNOSIS — I10 ESSENTIAL HYPERTENSION: ICD-10-CM

## 2025-05-07 DIAGNOSIS — R63.4 WEIGHT LOSS: ICD-10-CM

## 2025-05-07 PROCEDURE — 3008F BODY MASS INDEX DOCD: CPT | Mod: CPTII,S$GLB,, | Performed by: PHYSICIAN ASSISTANT

## 2025-05-07 PROCEDURE — 1159F MED LIST DOCD IN RCRD: CPT | Mod: CPTII,S$GLB,, | Performed by: PHYSICIAN ASSISTANT

## 2025-05-07 PROCEDURE — 3075F SYST BP GE 130 - 139MM HG: CPT | Mod: CPTII,S$GLB,, | Performed by: PHYSICIAN ASSISTANT

## 2025-05-07 PROCEDURE — 99213 OFFICE O/P EST LOW 20 MIN: CPT | Mod: S$GLB,,, | Performed by: PHYSICIAN ASSISTANT

## 2025-05-07 PROCEDURE — 3078F DIAST BP <80 MM HG: CPT | Mod: CPTII,S$GLB,, | Performed by: PHYSICIAN ASSISTANT

## 2025-05-07 PROCEDURE — 99999 PR PBB SHADOW E&M-EST. PATIENT-LVL IV: CPT | Mod: PBBFAC,,, | Performed by: PHYSICIAN ASSISTANT

## 2025-05-07 NOTE — TELEPHONE ENCOUNTER
----- Message from Usha sent at 5/7/2025 10:08 AM CDT -----  Regarding: Consult Advisory  Contact: Criss  CONSULT/ADVISORYName of Caller: Rashida Preference: [phone] (home) Nature of Call: Pt would like to speak with you in regards to her reboot diet. Would like a call back. She does have an appt from 11:30 - 12:30 and Would like a call before or after that time period to further discuss

## 2025-05-07 NOTE — PROGRESS NOTES
BARIATRIC POST-OPERATIVE VISIT:    HPI:  Criss Link is a 54 y.o. year old female presents for 8 month  post op visit following s/p sleeve.  she is doing well and tolerating the diet without difficulty.  she has no complaints.    Pt states that she is doing well   States that she did gain some weight states that she gained some weight after reconnecting with her high school sweetheart   Pt states that she was down to 230 lbs       Denies: nausea, vomiting, abdominal pain, changes in bowel movement pattern, fever, chills, dysphagia, chest pain, and shortness of breath.    Review of Systems   Constitutional:  Negative for chills, fatigue and fever.   HENT:  Negative for tinnitus and trouble swallowing.    Eyes:  Negative for visual disturbance.   Respiratory:  Negative for choking, chest tightness and shortness of breath.    Cardiovascular:  Negative for chest pain, palpitations and leg swelling.   Gastrointestinal:  Negative for abdominal pain, blood in stool, constipation, diarrhea, nausea and vomiting.   Genitourinary:  Negative for decreased urine volume.   Skin:  Negative for rash.   Neurological:  Negative for dizziness, light-headedness and headaches.   Psychiatric/Behavioral:  Negative for decreased concentration, self-injury, sleep disturbance and suicidal ideas. The patient is not nervous/anxious.        EXERCISE & VITAMINS:  See Bariatric Assessment  Adherent to vitamin   MEDICATIONS/ALLERGIES:  Have been reviewed.    DIET: See Dietician note from today for a more detailed assessment.        Physical Exam  Vitals reviewed.   Constitutional:       Appearance: She is obese.   HENT:      Head: Normocephalic and atraumatic.   Eyes:      Extraocular Movements: Extraocular movements intact.      Conjunctiva/sclera: Conjunctivae normal.      Pupils: Pupils are equal, round, and reactive to light.   Cardiovascular:      Rate and Rhythm: Normal rate and regular rhythm.      Pulses: Normal pulses.      Heart  sounds: Normal heart sounds.   Pulmonary:      Effort: Pulmonary effort is normal. No respiratory distress.      Breath sounds: Normal breath sounds.   Abdominal:      General: Bowel sounds are normal.      Palpations: Abdomen is soft.      Tenderness: There is no abdominal tenderness.   Musculoskeletal:         General: No swelling. Normal range of motion.      Cervical back: Normal range of motion and neck supple.   Skin:     General: Skin is warm and dry.      Capillary Refill: Capillary refill takes less than 2 seconds.   Neurological:      General: No focal deficit present.      Mental Status: She is alert and oriented to person, place, and time.   Psychiatric:         Mood and Affect: Mood normal.         Behavior: Behavior normal.         Thought Content: Thought content normal.         Judgment: Judgment normal.       ASSESSMENT:  - Morbid obesity s/p sleeve gastrectomy   - Co-morbidities: hypertension and anxiety  - Good Weight loss, 29#'s and 21% EWL  - No formal Exercise routine  - Poor Diet  - Good Vitamin regimen    PLAN:  - No lifting more than 10 lbs for 6 weeks  - Miralax daily for constipation  - Emphasized the importance of regular exercise and adherence to bariatric diet to achieve maximum weight loss.  - Encouraged patient to start/continue regular exercise.  - Follow-up with dietician to advance diet.  - Continue daily vitamins and medications.  - RTC per schedule  - Call the office for any issues.  - Check labs today.

## 2025-05-07 NOTE — TELEPHONE ENCOUNTER
Per AYDIN Cordon, called PT to schedule a back on track visit. PT was driving and will call me back.

## 2025-05-07 NOTE — TELEPHONE ENCOUNTER
"Returned PT call to schedule "back on track" appt. PT is scheduled for 5/9/25 at 9:30 am. PT confirmed date and time.  "

## 2025-05-07 NOTE — PATIENT INSTRUCTIONS
REBOOT DIET  High Protein Liquid Diet to Modified Liquid Diet  *Remember to always get in  grams of protein daily*    Sugar Free clear liquids allowed in unlimited amounts (H20, Crystal light, SF jello, low sodium broth, Powerade Zero, Caffeine free tea, Diet V8 splash, etc)      For 3 days you should drink 4: 25-30 gram protein shakes (must have 4 g of sugar or less) to obtain between  grams of protein.        On Day 4 you will replace 1 protein shake with 1 high protein foods.  Therefore you will be drinking 3 protein shakes with 1 high protein foods. You will do this for 1 week.    After 1 week on 3 shakes and 1 high protein foods, you will replace another shake with 1-2 high protein foods.  Therefore you will be drinking 2 protein shakes with 3 high protein foods.  You will do this until you meet your desired goal.     High Protein Foods Include:  Canned tuna or chicken (packed in water)  Lean ground turkey breast or ground round  Turkey or chicken (no skin); cooked tender and cut in small pieces  [employer] [place] chicken meatballs  Lean pork or beef (cook in crock pot until very tender; cut in small pieces  Scrambled, poached, or boiled eggs  Baked, broiled, grilled or boiled fish and seafood (not fried!)  Silken tofu, Edamame (soybeans)  Beans, hummus and lentils  Lean deli meats (turkey and chicken breast, ham, roast beef)  1% or Skim Milk, Lactaid, or Soymilk  Low-fat or fat-free cottage cheese, soft cheese, mozzarella string cheese, or ricotta   Light yogurt, Greek yogurt, SF pudding      Avoid all items below:  High fat milk (whole, 2%)  Butter, margarine, oil, mayonnaise  Sour cream, cream cheese, salad dressing  Ice Cream Cakes, cookies, pies, desserts, candy  Luncheon meats (bologna, salami, chopped ham)  Corn Granola/cereal with nuts  Shredded Coconut  Sausage, Watts Gravy Fried Foods  White and wheat Bread, Rice, Pasta Cereals (including grits, oatmeal)  Crackers, Pretzels, Chips, Granola Corn,  Popcorn, Peas  White Potatoes, Sweet potatoes Flour and corn tortillas  Sugary drinks  Carbonated drinks  Alcohol

## 2025-05-07 NOTE — TELEPHONE ENCOUNTER
----- Message from Tara Cordon PA-C sent at 5/7/2025  9:14 AM CDT -----  Regarding: Back on track  Hi This pt has gained some weight would like to do back on track visits. BM

## 2025-05-09 ENCOUNTER — CLINICAL SUPPORT (OUTPATIENT)
Dept: BARIATRICS | Facility: CLINIC | Age: 55
End: 2025-05-09
Payer: COMMERCIAL

## 2025-05-09 ENCOUNTER — LAB VISIT (OUTPATIENT)
Dept: LAB | Facility: HOSPITAL | Age: 55
End: 2025-05-09
Payer: COMMERCIAL

## 2025-05-09 VITALS — WEIGHT: 246.69 LBS | BODY MASS INDEX: 42.35 KG/M2

## 2025-05-09 DIAGNOSIS — Z98.84 S/P BARIATRIC SURGERY: ICD-10-CM

## 2025-05-09 DIAGNOSIS — I10 ESSENTIAL HYPERTENSION: ICD-10-CM

## 2025-05-09 DIAGNOSIS — Z71.3 DIETARY COUNSELING AND SURVEILLANCE: Primary | ICD-10-CM

## 2025-05-09 DIAGNOSIS — E66.01 MORBID OBESITY WITH BMI OF 40.0-44.9, ADULT: ICD-10-CM

## 2025-05-09 DIAGNOSIS — G47.33 OSA ON CPAP: ICD-10-CM

## 2025-05-09 DIAGNOSIS — E66.01 MORBID OBESITY: ICD-10-CM

## 2025-05-09 LAB
25(OH)D3+25(OH)D2 SERPL-MCNC: 56 NG/ML (ref 30–96)
ABSOLUTE EOSINOPHIL (OHS): 0.1 K/UL
ABSOLUTE MONOCYTE (OHS): 0.54 K/UL (ref 0.3–1)
ABSOLUTE NEUTROPHIL COUNT (OHS): 3.97 K/UL (ref 1.8–7.7)
ALBUMIN SERPL BCP-MCNC: 4.1 G/DL (ref 3.5–5.2)
ALP SERPL-CCNC: 124 UNIT/L (ref 40–150)
ALT SERPL W/O P-5'-P-CCNC: 16 UNIT/L (ref 10–44)
ANION GAP (OHS): 10 MMOL/L (ref 8–16)
AST SERPL-CCNC: 19 UNIT/L (ref 11–45)
BASOPHILS # BLD AUTO: 0.02 K/UL
BASOPHILS NFR BLD AUTO: 0.3 %
BILIRUB SERPL-MCNC: 0.6 MG/DL (ref 0.1–1)
BUN SERPL-MCNC: 14 MG/DL (ref 6–20)
CALCIUM SERPL-MCNC: 9.6 MG/DL (ref 8.7–10.5)
CHLORIDE SERPL-SCNC: 103 MMOL/L (ref 95–110)
CHOLEST SERPL-MCNC: 253 MG/DL (ref 120–199)
CHOLEST/HDLC SERPL: 2.7 {RATIO} (ref 2–5)
CO2 SERPL-SCNC: 29 MMOL/L (ref 23–29)
CREAT SERPL-MCNC: 0.8 MG/DL (ref 0.5–1.4)
ERYTHROCYTE [DISTWIDTH] IN BLOOD BY AUTOMATED COUNT: 12.9 % (ref 11.5–14.5)
GFR SERPLBLD CREATININE-BSD FMLA CKD-EPI: >60 ML/MIN/1.73/M2
GLUCOSE SERPL-MCNC: 88 MG/DL (ref 70–110)
HCT VFR BLD AUTO: 41.5 % (ref 37–48.5)
HDLC SERPL-MCNC: 95 MG/DL (ref 40–75)
HDLC SERPL: 37.5 % (ref 20–50)
HGB BLD-MCNC: 13.6 GM/DL (ref 12–16)
IMM GRANULOCYTES # BLD AUTO: 0.02 K/UL (ref 0–0.04)
IMM GRANULOCYTES NFR BLD AUTO: 0.3 % (ref 0–0.5)
IRON SATN MFR SERPL: 26 % (ref 20–50)
IRON SERPL-MCNC: 97 UG/DL (ref 30–160)
LDLC SERPL CALC-MCNC: 139.4 MG/DL (ref 63–159)
LYMPHOCYTES # BLD AUTO: 1.53 K/UL (ref 1–4.8)
MCH RBC QN AUTO: 29.6 PG (ref 27–31)
MCHC RBC AUTO-ENTMCNC: 32.8 G/DL (ref 32–36)
MCV RBC AUTO: 90 FL (ref 82–98)
NONHDLC SERPL-MCNC: 158 MG/DL
NUCLEATED RBC (/100WBC) (OHS): 0 /100 WBC
PLATELET # BLD AUTO: 328 K/UL (ref 150–450)
PMV BLD AUTO: 9.8 FL (ref 9.2–12.9)
POTASSIUM SERPL-SCNC: 3.8 MMOL/L (ref 3.5–5.1)
PROT SERPL-MCNC: 7.7 GM/DL (ref 6–8.4)
RBC # BLD AUTO: 4.59 M/UL (ref 4–5.4)
RELATIVE EOSINOPHIL (OHS): 1.6 %
RELATIVE LYMPHOCYTE (OHS): 24.8 % (ref 18–48)
RELATIVE MONOCYTE (OHS): 8.7 % (ref 4–15)
RELATIVE NEUTROPHIL (OHS): 64.3 % (ref 38–73)
SODIUM SERPL-SCNC: 142 MMOL/L (ref 136–145)
TIBC SERPL-MCNC: 374 UG/DL (ref 250–450)
TRANSFERRIN SERPL-MCNC: 253 MG/DL (ref 200–375)
TRIGL SERPL-MCNC: 93 MG/DL (ref 30–150)
VIT B12 SERPL-MCNC: 1246 PG/ML (ref 210–950)
WBC # BLD AUTO: 6.18 K/UL (ref 3.9–12.7)

## 2025-05-09 PROCEDURE — 36415 COLL VENOUS BLD VENIPUNCTURE: CPT

## 2025-05-09 PROCEDURE — 84155 ASSAY OF PROTEIN SERUM: CPT

## 2025-05-09 PROCEDURE — 84425 ASSAY OF VITAMIN B-1: CPT

## 2025-05-09 PROCEDURE — 99999 PR PBB SHADOW E&M-EST. PATIENT-LVL I: CPT | Mod: PBBFAC,,, | Performed by: DIETITIAN, REGISTERED

## 2025-05-09 PROCEDURE — 82306 VITAMIN D 25 HYDROXY: CPT

## 2025-05-09 PROCEDURE — 82607 VITAMIN B-12: CPT

## 2025-05-09 PROCEDURE — 85025 COMPLETE CBC W/AUTO DIFF WBC: CPT

## 2025-05-09 PROCEDURE — 80061 LIPID PANEL: CPT

## 2025-05-09 PROCEDURE — 84466 ASSAY OF TRANSFERRIN: CPT

## 2025-05-09 NOTE — PROGRESS NOTES
NUTRITION NOTE  Referring Physician: Edyta Rosas M.D.   Reason for MNT Referral: Back on Track s/p laparoscopic sleeve gastrectomy    PAST MEDICAL HISTORY:  Denies nausea, vomiting, constipation, and diarrhea.  Reports doing well.    Past Medical History:   Diagnosis Date    Acute bilateral low back pain without sciatica 01/10/2024    - s/p fall  - no signs of neurological claudication   -recommend x-ray imaging  -recommend physical therapy      Allergies     Bilateral carpal tunnel syndrome 01/10/2024    - discussed that her symptoms seem consistent with carpal tunnel syndrome  -recommend start with bilateral carpal tunnel wrist splints.  I recommend wearing at night.  She can also try risk compression while she was working during the day.    - if no improvement of continued worsening recommend evaluation by hand surgery      Decreased range of motion (ROM) of left knee 06/29/2022    Depression     Hypertension     Idiopathic polypoidal choroidal vasculopathy     Prediabetes        BARIATRIC HISTORY  Approx. date of last visit to clinic    Weight history since last visit      CLINICAL DATA:  55 y.o.-year-old Black or  female.    Current Weight: 246 lbs  BMI: 42.35  Total Weight Loss: 33 lbs    Excess Weight Loss: 23%      CURRENT DIET:  Bariatric regular diet  Diet Recall: Food records are not present.    Diet Recall: 85 grams of protein/day; 32 oz of fluids/day    B: 2 eggs with cheese and [employer] chipotle sauce  L: Stir ricci tofu and vegetables (mixed vegetables, broccoli) with different spices/sauces  D: Baked fish and vegetables    Diet Includes:   Meal Pattern: 3 meal(s) + 0 snack(s) + 1 protein supplement(s)  Adequate protein intake. Premier Protein, Gordy  Adequate dairy intake. Ratio protein yogurt  Adequate vegetable intake.  Adequate fruit intake.Blackberries, strawberries, mangoes  Starchy CHO: none  Beverages: Water    LABS:  Reviewed    VITAMINS/MINERALS:  Centrum multivitamin  with iron  Calcium Citrate 500 mg 2 pills, 2 x day  B-1 250 mg once a week   Vitamin B-12 2500 mcg, once a week     EXERCISE:  None     SOCIAL  Works regular daytime shifts. Mon-Fri 8 am-5 pm   Lives with fitiffany lives with PT, will be going away for work for the summer.  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    ASSESSMENT:  Doing fairly well overall.  Adequate protein intake.  Inadequate fluid intake.  Advancing diet appropriately.  Not exercising.  Adequate vitamins & minerals.    BARIATRIC DIET DISCUSSION:  Instructed and provided written materials on bariatric solid diet plan.  Reinforced post-op nutrition guidelines.  Discussed protein bars, looked at examples online, try to keep under 200 phyllis  Discussed protein yogurt, looked at examples online  Discussed 1 month modified liquid diet - instructions provided  Discussed tracking intake after completing modified liquid diet. PT has [address] preet. Recommended 800-1000 cals per day    PLAN / RECOMMENDATIONS:  Continue bariatric regular diet.  Maintain protein intake.  Increase fluid intake.  Begin light exercise.  Continue appropriate vitamins & minerals.    Follow up visit(s) with RD in one month    SESSION TIME: 30 minutes

## 2025-05-12 ENCOUNTER — RESULTS FOLLOW-UP (OUTPATIENT)
Dept: PRIMARY CARE CLINIC | Facility: CLINIC | Age: 55
End: 2025-05-12

## 2025-05-12 ENCOUNTER — PATIENT MESSAGE (OUTPATIENT)
Dept: BARIATRICS | Facility: CLINIC | Age: 55
End: 2025-05-12
Payer: COMMERCIAL

## 2025-05-13 ENCOUNTER — PATIENT MESSAGE (OUTPATIENT)
Dept: BARIATRICS | Facility: CLINIC | Age: 55
End: 2025-05-13

## 2025-05-14 LAB — W VITAMIN B1: 77 UG/L

## 2025-05-15 ENCOUNTER — OFFICE VISIT (OUTPATIENT)
Dept: PULMONOLOGY | Facility: CLINIC | Age: 55
End: 2025-05-15
Payer: COMMERCIAL

## 2025-05-15 VITALS
HEIGHT: 64 IN | OXYGEN SATURATION: 98 % | WEIGHT: 250.56 LBS | HEART RATE: 74 BPM | SYSTOLIC BLOOD PRESSURE: 147 MMHG | BODY MASS INDEX: 42.78 KG/M2 | DIASTOLIC BLOOD PRESSURE: 86 MMHG

## 2025-05-15 DIAGNOSIS — E66.813 CLASS 3 SEVERE OBESITY DUE TO EXCESS CALORIES WITH SERIOUS COMORBIDITY AND BODY MASS INDEX (BMI) OF 40.0 TO 44.9 IN ADULT: Primary | ICD-10-CM

## 2025-05-15 DIAGNOSIS — G47.33 OSA ON CPAP: ICD-10-CM

## 2025-05-15 DIAGNOSIS — J31.0 CHRONIC RHINITIS: ICD-10-CM

## 2025-05-15 DIAGNOSIS — E66.01 CLASS 3 SEVERE OBESITY DUE TO EXCESS CALORIES WITH SERIOUS COMORBIDITY AND BODY MASS INDEX (BMI) OF 40.0 TO 44.9 IN ADULT: Primary | ICD-10-CM

## 2025-05-15 PROCEDURE — 99999 PR PBB SHADOW E&M-EST. PATIENT-LVL IV: CPT | Mod: PBBFAC,,, | Performed by: INTERNAL MEDICINE

## 2025-05-15 RX ORDER — MONTELUKAST SODIUM 10 MG/1
10 TABLET ORAL NIGHTLY
Qty: 90 TABLET | Refills: 3 | Status: SHIPPED | OUTPATIENT
Start: 2025-05-15

## 2025-05-15 NOTE — TELEPHONE ENCOUNTER
No care due was identified.  [facility] Embedded Care Due Messages. Reference number: [id number].   5/15/2025 12:07:13 AM CDT

## 2025-05-15 NOTE — PROGRESS NOTES
Criss Link  was seen as a follow up.    CHIEF COMPLAINT:    Chief Complaint   Patient presents with    Apnea       HISTORY OF PRESENT ILLNESS: Criss Link is a 55 y.o. female is here for sleep evaluation.   Our first encounter was 4/8/21.  At that time, patient was seen by Dr. Mock for elevated intraoccular pressure.  Patient was recommended to have cate evaluaton by Dr. Mock. Patient with loud snoring.  +witnessed apnea.  Tire upon awake 3-4 times per week.  No parasomnia.  No cataplexy.  No rls symptoms.      Whately Sleepiness Scale score during initial sleep evaluation was 10.  Today's ESS is 0    S/p hsat 4/23/21 with ahi of 5 and rdi of 15.  Patient was set up with apap.  Patient is doing well with apap.  Sleep quality improve with apap.  Feeling rested upon awake.  No new issue.  Sleep unchanged.  Needing new equipments.  Door holding airfilter often comes off.   S/p gastric sleeve in 8/2024.  Lost 50 lbs with gastric sleep.  Snoring has improved with weight loss.  Sleep is restful.  Occasional dry nose    SLEEP ROUTINE:  Activity the hour prior to sleep:  Bath     Bed partner:  daughter  Time to bed:  8 pm   Lights off:  tv is on   Sleep onset latency:  30 minutes (watch tv until asleep)        Disruptions or awakenings:    none    Wakeup time:      5 am   Perceived sleep quality:  rested    Daytime naps:      none  Weekend sleep routine:      8 pm to 4:45 am   Caffeine use: 1 cup of coffee and energy drink  exercise habit:   none      PAST MEDICAL HISTORY:    Active Ambulatory Problems     Diagnosis Date Noted    Class 3 severe obesity due to excess calories with serious comorbidity and body mass index (BMI) of 40.0 to 44.9 in adult 01/30/2020    Essential hypertension 01/30/2020    Prediabetes 01/30/2020    Seasonal allergies 01/30/2020    Neovascular age-related macular degeneration 09/16/2020    CATE on CPAP 04/08/2021    Vitamin D insufficiency 10/16/2021    Generalized anxiety disorder  01/25/2023    Recurrent major depressive disorder, in full remission 01/25/2023    Hirsutism 01/26/2023    Chronic rhinitis 03/30/2023    Chronic bilateral low back pain without sciatica 02/12/2024    Thiamine deficiency 05/20/2024     Resolved Ambulatory Problems     Diagnosis Date Noted    Vaginal discharge 01/30/2020    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    Normocytic anemia 09/16/2020    Snoring 02/26/2021    Nasal congestion 04/08/2021    Hypokalemia 10/16/2021    Decreased range of motion (ROM) of left knee 06/29/2022    Weakness of left lower extremity 06/29/2022    Trichimoniasis 01/26/2023    COVID-19 02/01/2023    Pharyngitis 02/01/2023    Acute non-recurrent maxillary sinusitis 02/27/2023    Acute bilateral low back pain without sciatica 01/10/2024    Upper back pain 01/10/2024    Bilateral carpal tunnel syndrome 01/10/2024    Muscle weakness of lower extremity 02/12/2024    Obesity 08/26/2024    Encounter for general adult medical examination with abnormal findings 10/31/2024     Past Medical History:   Diagnosis Date    Allergies     Depression     Hypertension                 PAST SURGICAL HISTORY:    Past Surgical History:   Procedure Laterality Date    INJECTION OF ANESTHETIC AGENT INTO TISSUE PLANE DEFINED BY TRANSVERSUS ABDOMINIS MUSCLE Bilateral 8/26/2024    Procedure: BLOCK, TRANSVERSUS ABDOMINIS PLANE;  Surgeon: Edyta Hernandez MD;  Location: [address];  Service: General;  Laterality: Bilateral;    LAPAROSCOPIC SLEEVE GASTRECTOMY N/A 8/26/2024    Procedure: GASTRECTOMY, SLEEVE, LAPAROSCOPIC with intraop EGD;  Surgeon: Edyta Hernandez MD;  Location: [facility] OR [address] [place] [address];  Service: General;  Laterality: N/A;    TUBAL LIGATION  12/30/2004    LT         FAMILY HISTORY:                Family History   Problem Relation Name Age of Onset    Heart disease Mother Adrianne Link     Depression Mother Adrianne Link     Hypertension Father Manuel Link     Diabetes  Father Manuel Link     Kidney cancer Father Manuel Link     Cancer Father Manuel Link     No Known Problems Daughter      No Known Problems Son      Lymphoma Maternal Uncle      Lymphoma Maternal Grandmother      Cancer Maternal Grandmother         SOCIAL HISTORY:          Tobacco:   Social History     Tobacco Use   Smoking Status Never    Passive exposure: Never   Smokeless Tobacco Never       alcohol use:    Social History     Substance and Sexual Activity   Alcohol Use Yes    Alcohol/week: 2.0 standard drinks of alcohol    Types: 2 Drinks containing 0.5 oz of alcohol per week    Comment: Socially                 Occupation:  on [employer]    ALLERGIES:  Review of patient's allergies indicates:  No Known Allergies    CURRENT MEDICATIONS:    Current Outpatient Medications   Medication Sig Dispense Refill    amLODIPine (NORVASC) 5 MG tablet Take 1 tablet (5 mg total) by mouth once daily. 90 tablet 3    ascorbic acid, vitamin C, (VITAMIN C) 1000 MG tablet Take 1,000 mg by mouth once daily.      cholecalciferol, vitamin D3, (D3-2000) 50 mcg (2,000 unit) Cap capsule Take 2,000 Units by mouth once daily.      ferrous gluconate (FERGON) 324 MG tablet Take 324 mg by mouth daily with breakfast.      gabapentin (NEURONTIN) 300 MG capsule Take 1 cap by mouth 2 times daily. Open capsule and empty prior to taking. Take one dose on morning of surgery prior to arrival. Take 1 cap twice a day for 3 days post-op. Continue for an additional 2 days as needed. 10 capsule 0    loratadine (CLARITIN) 10 mg tablet Take 10 mg by mouth once daily.      montelukast (SINGULAIR) 10 mg tablet TAKE 1 TABLET BY MOUTH EVERY DAY IN THE EVENING 90 tablet 3    omeprazole (PRILOSEC) 40 MG capsule Take 1 capsule (40 mg total) by mouth every morning. Open capsule and take with apple sauce 30 capsule 2    ondansetron (ZOFRAN) 4 MG tablet Take 1 tablet (4 mg total) by mouth every 6 (six) hours as needed for Nausea. 20 tablet 0     "spironolactone (ALDACTONE) 50 MG tablet Take 1 tablet (50 mg total) by mouth once daily. 90 tablet 3    thiamine 250 MG tablet Take 250 mg by mouth every 7 days.      tiZANidine (ZANAFLEX) 4 MG tablet TAKE 1 TABLET (4 MG TOTAL) BY MOUTH NIGHTLY AS NEEDED (BACK PAIN). 90 tablet 1    triamcinolone acetonide 0.1% (KENALOG) 0.1 % cream Apply topically 2 (two) times daily. 28.4 g 0    ursodioL (ACTIGALL) 500 MG tablet Take 1 tablet (500 mg total) by mouth once daily. 30 tablet 1     No current facility-administered medications for this visit.                  REVIEW OF SYSTEMS:   No acute changes from previous encounter dated 4/8/2021 with exceptions mentioned in HPI.             PHYSICAL EXAM:  Vitals:    05/15/25 1106   BP: (!) 147/86   Pulse: 74   SpO2: 98%   Weight: 113.6 kg (250 lb 8.8 oz)   Height: 5' 4" (1.626 m)   PainSc: 0-No pain       Body mass index is 43.01 kg/m².     GENERAL: Normal development, well groomed  HEENT:  Conjunctivae are non-erythematous; Pupils equal, round, and reactive to light; Nose is symmetrical; Nasal mucosa is pink and moist; Septum is midline; Inferior turbinates are normal; Nasal airflow is normal; Posterior pharynx is pink; Modified Mallampati: 3; Posterior palate is normal; Tonsils +2; Uvula is normal and pink;Tongue is normal; Dentition is fair; No TMJ tenderness; Jaw opening and protrusion without click and without discomfort.  NECK: Supple. Neck circumference is 17.5 inches. No thyromegaly. No palpable nodes.     SKIN: On face and neck: No abrasions, no rashes, no lesions.  No subcutaneous nodules are palpable.  RESPIRATORY: Chest is clear to auscultation.  Normal chest expansion and non-labored breathing at rest.  CARDIOVASCULAR: Normal S1, S2.  No murmurs, gallops or rubs. No carotid bruits bilaterally.  EXTREMITIES: No edema. No clubbing. No cyanosis. Station normal. Gait normal.        NEURO/PSYCH: Oriented to time, place and person. Normal attention span and concentration. " Affect is full. Mood is normal.                                              DATA    4/23/21 ahi of 5; rdi of 15    Echo 9/22/20  There is left ventricular concentric remodeling.  Normal left ventricular systolic function. The estimated ejection fraction is 60-65%.  Normal LV diastolic function.  Normal right ventricular systolic function.  The estimated PA systolic pressure is 23 mmHg.  No wall motion abnormalities.  Normal central venous pressure (3 mmHg).    Lab Results   Component Value Date    TSH 0.823 05/09/2024     ASSESSMENT/PLAN  Problem List Items Addressed This Visit       Class 3 severe obesity due to excess calories with serious comorbidity and body mass index (BMI) of 40.0 to 44.9 in adult - Primary    Overview   - s/p gastric sleeve 08/26/2024  - > 40 lbs lost since surgery and 55 lb lost since 05/2024             CATE on CPAP    Overview   4/23/21 Sleep study: AHI of 5.  AutoPAP 5-15 cmH2O  Snoring has much improved with weight loss.  Per patient, sleep is better with apap.  Await weight loss to plateau and repeat hsat.    Humidifier increased to mitigate dry nose.           Relevant Orders    CPAP/BIPAP SUPPLIES           Education: During our discussion today, we talked about the etiology of obstructive sleep apnea as well as the potential ramifications of untreated sleep apnea, which could include daytime sleepiness, hypertension, heart disease and/or stroke.     Precautions: The patient was advised to abstain from driving should they feel sleepy or drowsy.       Patient will No follow-ups on file. with md/np.    Visit today included increased complexity associated with the care of the episodic problem cate addressed and managing the longitudinal care of the patient due to the serious and/or complex managed problem(s) cate.

## 2025-05-15 NOTE — TELEPHONE ENCOUNTER
Refill Routing Note   Medication(s) are not appropriate for processing by [facility] for the following reason(s):        Drug-disease interaction    [facility] action(s):  Defer        Medication Therapy Plan: Drug-Disease: montelukast and Recurrent major depressive disorder, in full remission; DEFER      Appointments  past 12m or future 3m with PCP    Date Provider   Last Visit   3/17/2025 Leanna Galeano, DO   Next Visit   Visit date not found Leanna Galeano, DO   ED visits in past 90 days: 0        Note composed:4:40 AM 05/15/2025

## 2025-06-03 ENCOUNTER — PATIENT MESSAGE (OUTPATIENT)
Dept: BARIATRICS | Facility: CLINIC | Age: 55
End: 2025-06-03
Payer: COMMERCIAL

## 2025-06-16 ENCOUNTER — CLINICAL SUPPORT (OUTPATIENT)
Dept: BARIATRICS | Facility: CLINIC | Age: 55
End: 2025-06-16
Payer: COMMERCIAL

## 2025-06-16 DIAGNOSIS — I10 ESSENTIAL HYPERTENSION: ICD-10-CM

## 2025-06-16 DIAGNOSIS — Z98.84 S/P BARIATRIC SURGERY: ICD-10-CM

## 2025-06-16 DIAGNOSIS — Z71.3 DIETARY COUNSELING AND SURVEILLANCE: Primary | ICD-10-CM

## 2025-06-16 DIAGNOSIS — G47.33 OSA ON CPAP: ICD-10-CM

## 2025-06-16 DIAGNOSIS — E66.01 MORBID OBESITY WITH BMI OF 40.0-44.9, ADULT: ICD-10-CM

## 2025-06-16 PROCEDURE — 97803 MED NUTRITION INDIV SUBSEQ: CPT | Mod: 95,,, | Performed by: DIETITIAN, REGISTERED

## 2025-06-16 NOTE — PROGRESS NOTES
The patient location is: [place] ([place])  The chief complaint leading to consultation is: Follow-up/Back on Track s/p laparoscopic Bypass  Visit type: audiovisual     Face to Face time with patient: 20 min    25 minutes of total time spent on the encounter, which includes face to face time and non-face to face time preparing to see the patient (eg, review of tests), Obtaining and/or reviewing separately obtained history, Documenting clinical information in the electronic or other health record, Independently interpreting results (not separately reported) and communicating results to the patient/family/caregiver, or Care coordination (not separately reported).       Each patient to whom he or she provides medical services by telemedicine is:  (1) informed of the relationship between the physician and patient and the respective role of any other health care provider with respect to management of the patient; and (2) notified that he or she may decline to receive medical services by telemedicine and may withdraw from such care at any time.    NUTRITION NOTE  Referring Physician: Edyta Rosas M.D.   Reason for MNT Referral: Follow-up/Back on Track s/p laparoscopic Bypass    PAST MEDICAL HISTORY:  Past Medical History:   Diagnosis Date    Acute bilateral low back pain without sciatica 01/10/2024    - s/p fall  - no signs of neurological claudication   -recommend x-ray imaging  -recommend physical therapy      Allergies     Bilateral carpal tunnel syndrome 01/10/2024    - discussed that her symptoms seem consistent with carpal tunnel syndrome  -recommend start with bilateral carpal tunnel wrist splints.  I recommend wearing at night.  She can also try risk compression while she was working during the day.    - if no improvement of continued worsening recommend evaluation by hand surgery      Decreased range of motion (ROM) of left knee 06/29/2022    Depression     Hypertension     Idiopathic polypoidal choroidal  vasculopathy     Prediabetes      CLINICAL DATA:  55 y.o.-year-old Black or  female.    Back on Track wt: 246 lbs  Pre-op wt: 279 lbs  Current Weight: 242 lbs PT reported  BMI: 41.5  Total Weight Loss: 27 lbs    Excess Weight Loss: 26%      CURRENT DIET:  Reduced-calorie diet.  Diet Recall: Food records are not present.    PT complains of having sinus problems due to environmental allergies. PT says she started the modified liquid diet until sinuses started bothering her.    Diet Recall: 65 grams of protein/day; 64+ oz of fluids/day    B: Protein shake  L: Protein shake  D: Stir-fried tofu with vegetables     Diet Includes:   Meal Pattern: 3 meal(s) + 0 snack(s) + 2 protein supplement(s)  Adequate protein intake. Premier Protein  Inadequate dairy intake. Not recently  Adequate vegetable intake. Broccoli, stir ricci blend  Inadequate fruit intake. None  Starchy CHO: none  Beverages: Water      LABS:  Reviewed     VITAMINS/MINERALS:  Centrum multivitamin with iron  Calcium Citrate 500 mg 2 pills, 2 x day  B-1 250 mg once a week   Vitamin B-12 2500 mcg, once a week      EXERCISE:  Gym 3 mckeon/week - 15 mins on treadmill and strength training  Stairs at work 2-3 floors, multiple times per day     SOCIAL  Works regular daytime shifts. Mon-Fri 8 am-5 pm   Lives with fiance lives with PT, will be going away for work for the summer.  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    ASSESSMENT:  Doing fairly well overall.  Inadequate protein intake.  Adequate fluid intake.  Advancing diet appropriately.  Exercising.  Adequate vitamins & minerals.    BARIATRIC DIET DISCUSSION:  Discussed increasing protein - 4 oz of tofu at dinner, protein bar as a snack    PT Plan  Add protein bar as snack between lunch and dinner    PLAN / RECOMMENDATIONS:  Continue bariatric regular diet.  Increase protein intake.  Maintain fluid intake.  Continue light exercise.  Continue appropriate vitamins & minerals.    Follow up visit(s) with  TRISHA.    SESSION TIME: 25 minutes

## 2025-07-07 ENCOUNTER — PATIENT MESSAGE (OUTPATIENT)
Dept: BARIATRICS | Facility: CLINIC | Age: 55
End: 2025-07-07
Payer: COMMERCIAL

## 2025-07-08 ENCOUNTER — PATIENT MESSAGE (OUTPATIENT)
Dept: BARIATRICS | Facility: CLINIC | Age: 55
End: 2025-07-08
Payer: COMMERCIAL

## 2025-07-09 DIAGNOSIS — R11.0 NAUSEA: ICD-10-CM

## 2025-07-09 NOTE — TELEPHONE ENCOUNTER
No care due was identified.  [facility] Embedded Care Due Messages. Reference number: [id number].   7/09/2025 5:23:58 PM CDT

## 2025-07-10 RX ORDER — ONDANSETRON 4 MG/1
4 TABLET, FILM COATED ORAL EVERY 6 HOURS PRN
Qty: 12 TABLET | Refills: 1 | Status: SHIPPED | OUTPATIENT
Start: 2025-07-10

## 2025-07-10 NOTE — TELEPHONE ENCOUNTER
Refill Encounter    PCP Visits: Recent Visits  Date Type Provider Dept   03/17/25 Office Visit Leanna Galeano DO [facility]   10/31/24 Office Visit Leanna Galeano,  [facility]   Showing recent visits within past 360 days and meeting all other requirements  Future Appointments  No visits were found meeting these conditions.  Showing future appointments within next 720 days and meeting all other requirements      Last 3 Blood Pressure:   BP Readings from Last 3 Encounters:   05/15/25 (!) 147/86   05/07/25 135/69   03/17/25 139/84     Preferred Pharmacy:   [facility]/pharmacy #[id number] - [facility] [address]  [address] [facility] [address]  Phone: [phone] Fax: [phone]        Requested RX:  Requested Prescriptions     Pending Prescriptions Disp Refills    ondansetron (ZOFRAN) 4 MG tablet [Pharmacy Med Name: ONDANSETRON HCL 4 MG TABLET] 12 tablet 1     Sig: TAKE 1 TABLET BY MOUTH EVERY 6 HOURS AS NEEDED FOR NAUSEA      RX Route: Normal

## 2025-07-17 DIAGNOSIS — L30.9 DERMATITIS: ICD-10-CM

## 2025-07-17 RX ORDER — TRIAMCINOLONE ACETONIDE 1 MG/G
CREAM TOPICAL 2 TIMES DAILY
Qty: 90 G | Refills: 0 | Status: SHIPPED | OUTPATIENT
Start: 2025-07-17

## 2025-07-17 NOTE — TELEPHONE ENCOUNTER
No care due was identified.  [facility] Embedded Care Due Messages. Reference number: [id number].   7/17/2025 10:41:33 AM CDT

## 2025-07-17 NOTE — TELEPHONE ENCOUNTER
Refill Decision Note   Criss Link  is requesting a refill authorization.  Brief Assessment and Rationale for Refill:  Approve     Medication Therapy Plan:         Comments:     Note composed:12:11 PM 07/17/2025

## 2025-08-12 ENCOUNTER — PATIENT MESSAGE (OUTPATIENT)
Dept: BARIATRICS | Facility: CLINIC | Age: 55
End: 2025-08-12
Payer: COMMERCIAL

## 2025-09-02 ENCOUNTER — OFFICE VISIT (OUTPATIENT)
Dept: PRIMARY CARE CLINIC | Facility: CLINIC | Age: 55
End: 2025-09-02
Payer: COMMERCIAL

## 2025-09-02 VITALS
BODY MASS INDEX: 43.36 KG/M2 | OXYGEN SATURATION: 100 % | DIASTOLIC BLOOD PRESSURE: 84 MMHG | WEIGHT: 254 LBS | HEART RATE: 67 BPM | HEIGHT: 64 IN | SYSTOLIC BLOOD PRESSURE: 133 MMHG

## 2025-09-02 DIAGNOSIS — R73.03 PREDIABETES: ICD-10-CM

## 2025-09-02 DIAGNOSIS — Z13.29 SCREENING FOR THYROID DISORDER: ICD-10-CM

## 2025-09-02 DIAGNOSIS — Z13.0 SCREENING FOR IRON DEFICIENCY ANEMIA: ICD-10-CM

## 2025-09-02 DIAGNOSIS — M94.0 COSTOCHONDRITIS, ACUTE: Primary | ICD-10-CM

## 2025-09-02 DIAGNOSIS — Z13.228 SCREENING FOR METABOLIC DISORDER: ICD-10-CM

## 2025-09-02 DIAGNOSIS — E66.813 CLASS 3 SEVERE OBESITY DUE TO EXCESS CALORIES WITH SERIOUS COMORBIDITY AND BODY MASS INDEX (BMI) OF 40.0 TO 44.9 IN ADULT: ICD-10-CM

## 2025-09-02 PROCEDURE — 3075F SYST BP GE 130 - 139MM HG: CPT | Mod: CPTII,S$GLB,,

## 2025-09-02 PROCEDURE — 99213 OFFICE O/P EST LOW 20 MIN: CPT | Mod: S$GLB,,,

## 2025-09-02 PROCEDURE — 3079F DIAST BP 80-89 MM HG: CPT | Mod: CPTII,S$GLB,,

## 2025-09-02 PROCEDURE — 99999 PR PBB SHADOW E&M-EST. PATIENT-LVL IV: CPT | Mod: PBBFAC,,,

## 2025-09-02 PROCEDURE — 1159F MED LIST DOCD IN RCRD: CPT | Mod: CPTII,S$GLB,,

## 2025-09-02 PROCEDURE — 3008F BODY MASS INDEX DOCD: CPT | Mod: CPTII,S$GLB,,

## (undated) DEVICE — TUBING HF INSUFFLATION W/ FLTR

## (undated) DEVICE — SUT 0 VICRYL / UR6 (J603)

## (undated) DEVICE — RELOAD ECHELON FLEX BLU 60MM

## (undated) DEVICE — SCALPEL #11 BLADE STRL DISP

## (undated) DEVICE — TRAY MINOR GEN SURG OMC

## (undated) DEVICE — ELECTRODE REM PLYHSV RETURN 9

## (undated) DEVICE — KIT PROCEDURE STER INLET CLOSU

## (undated) DEVICE — ADHESIVE DERMABOND ADVANCED

## (undated) DEVICE — DRAPE CORETEMP FLD WRM 56X62IN

## (undated) DEVICE — TROCAR ENDOPATH XCEL 5X100MM

## (undated) DEVICE — RELOAD ECHELON FLEX GRN 60MM

## (undated) DEVICE — NDL HYPO REG 25G X 1 1/2

## (undated) DEVICE — SUT MCRYL PLUS 4-0 PS2 27IN

## (undated) DEVICE — TROCAR ENDOPATH XCEL 12MM 10CM

## (undated) DEVICE — TROCAR ENDOPATH XCEL 11MM 10CM

## (undated) DEVICE — SHEARS HS LONG 5MM CURVED

## (undated) DEVICE — DRAPE ABDOMINAL TIBURON 14X11

## (undated) DEVICE — CANNULA ENDOPATH XCEL 5X100MM

## (undated) DEVICE — STAPLER ECHELON FLEX 60MM 44CM